# Patient Record
Sex: FEMALE | Race: WHITE | NOT HISPANIC OR LATINO | Employment: UNEMPLOYED | URBAN - METROPOLITAN AREA
[De-identification: names, ages, dates, MRNs, and addresses within clinical notes are randomized per-mention and may not be internally consistent; named-entity substitution may affect disease eponyms.]

---

## 2017-02-20 ENCOUNTER — TRANSCRIBE ORDERS (OUTPATIENT)
Dept: ADMINISTRATIVE | Facility: HOSPITAL | Age: 37
End: 2017-02-20

## 2017-02-20 DIAGNOSIS — J44.9 CHRONIC OBSTRUCTIVE PULMONARY DISEASE, UNSPECIFIED COPD TYPE (HCC): Primary | ICD-10-CM

## 2017-05-19 ENCOUNTER — APPOINTMENT (OUTPATIENT)
Dept: GENERAL RADIOLOGY | Facility: HOSPITAL | Age: 37
End: 2017-05-19

## 2017-05-19 ENCOUNTER — HOSPITAL ENCOUNTER (EMERGENCY)
Facility: HOSPITAL | Age: 37
Discharge: HOME | End: 2017-05-19
Attending: EMERGENCY MEDICINE | Admitting: EMERGENCY MEDICINE

## 2017-05-19 VITALS
HEIGHT: 69 IN | DIASTOLIC BLOOD PRESSURE: 58 MMHG | RESPIRATION RATE: 20 BRPM | TEMPERATURE: 98.1 F | SYSTOLIC BLOOD PRESSURE: 106 MMHG | BODY MASS INDEX: 30.51 KG/M2 | OXYGEN SATURATION: 98 % | HEART RATE: 49 BPM | WEIGHT: 206 LBS

## 2017-05-19 DIAGNOSIS — E87.6 HYPOKALEMIA: ICD-10-CM

## 2017-05-19 DIAGNOSIS — F41.0 ANXIETY ATTACK: ICD-10-CM

## 2017-05-19 DIAGNOSIS — R07.89 ATYPICAL CHEST PAIN: Primary | ICD-10-CM

## 2017-05-19 LAB
ANION GAP SERPL CALCULATED.3IONS-SCNC: 14.5 MMOL/L
BASOPHILS # BLD AUTO: 0.06 10*3/MM3 (ref 0–0.2)
BASOPHILS NFR BLD AUTO: 0.6 % (ref 0–2)
BUN BLD-MCNC: 5 MG/DL (ref 6–20)
BUN/CREAT SERPL: 5.2 (ref 7–25)
CALCIUM SPEC-SCNC: 8.8 MG/DL (ref 8.6–10.5)
CHLORIDE SERPL-SCNC: 104 MMOL/L (ref 98–107)
CO2 SERPL-SCNC: 19.5 MMOL/L (ref 22–29)
CREAT BLD-MCNC: 0.96 MG/DL (ref 0.57–1)
DEPRECATED RDW RBC AUTO: 40.8 FL (ref 37–54)
EOSINOPHIL # BLD AUTO: 0.23 10*3/MM3 (ref 0.1–0.3)
EOSINOPHIL NFR BLD AUTO: 2.3 % (ref 0–4)
ERYTHROCYTE [DISTWIDTH] IN BLOOD BY AUTOMATED COUNT: 13.1 % (ref 11.5–14.5)
GFR SERPL CREATININE-BSD FRML MDRD: 66 ML/MIN/1.73
GLUCOSE BLD-MCNC: 107 MG/DL (ref 65–99)
HCT VFR BLD AUTO: 35.8 % (ref 37–47)
HGB BLD-MCNC: 11.7 G/DL (ref 12–16)
IMM GRANULOCYTES # BLD: 0.05 10*3/MM3 (ref 0–0.03)
IMM GRANULOCYTES NFR BLD: 0.5 % (ref 0–0.5)
LITHIUM SERPL-SCNC: 1.1 MMOL/L (ref 0.6–1.2)
LYMPHOCYTES # BLD AUTO: 2.87 10*3/MM3 (ref 0.6–4.8)
LYMPHOCYTES NFR BLD AUTO: 28.5 % (ref 20–45)
MCH RBC QN AUTO: 28.2 PG (ref 27–31)
MCHC RBC AUTO-ENTMCNC: 32.7 G/DL (ref 31–37)
MCV RBC AUTO: 86.3 FL (ref 81–99)
MONOCYTES # BLD AUTO: 0.76 10*3/MM3 (ref 0–1)
MONOCYTES NFR BLD AUTO: 7.6 % (ref 3–8)
NEUTROPHILS # BLD AUTO: 6.09 10*3/MM3 (ref 1.5–8.3)
NEUTROPHILS NFR BLD AUTO: 60.5 % (ref 45–70)
NRBC BLD MANUAL-RTO: 0 /100 WBC (ref 0–0)
PLATELET # BLD AUTO: 314 10*3/MM3 (ref 140–500)
PMV BLD AUTO: 10.5 FL (ref 7.4–10.4)
POTASSIUM BLD-SCNC: 3.1 MMOL/L (ref 3.5–5.2)
RBC # BLD AUTO: 4.15 10*6/MM3 (ref 4.2–5.4)
SODIUM BLD-SCNC: 138 MMOL/L (ref 136–145)
TROPONIN T SERPL-MCNC: <0.01 NG/ML (ref 0–0.03)
WBC NRBC COR # BLD: 10.06 10*3/MM3 (ref 4.8–10.8)

## 2017-05-19 PROCEDURE — 85025 COMPLETE CBC W/AUTO DIFF WBC: CPT | Performed by: EMERGENCY MEDICINE

## 2017-05-19 PROCEDURE — 80048 BASIC METABOLIC PNL TOTAL CA: CPT | Performed by: EMERGENCY MEDICINE

## 2017-05-19 PROCEDURE — 99283 EMERGENCY DEPT VISIT LOW MDM: CPT

## 2017-05-19 PROCEDURE — 93010 ELECTROCARDIOGRAM REPORT: CPT | Performed by: INTERNAL MEDICINE

## 2017-05-19 PROCEDURE — 71020 HC CHEST PA AND LATERAL: CPT

## 2017-05-19 PROCEDURE — 96374 THER/PROPH/DIAG INJ IV PUSH: CPT

## 2017-05-19 PROCEDURE — 80178 ASSAY OF LITHIUM: CPT | Performed by: EMERGENCY MEDICINE

## 2017-05-19 PROCEDURE — 25010000002 KETOROLAC TROMETHAMINE PER 15 MG: Performed by: EMERGENCY MEDICINE

## 2017-05-19 PROCEDURE — 84484 ASSAY OF TROPONIN QUANT: CPT | Performed by: EMERGENCY MEDICINE

## 2017-05-19 PROCEDURE — 99284 EMERGENCY DEPT VISIT MOD MDM: CPT | Performed by: EMERGENCY MEDICINE

## 2017-05-19 PROCEDURE — 93005 ELECTROCARDIOGRAM TRACING: CPT | Performed by: EMERGENCY MEDICINE

## 2017-05-19 RX ORDER — POTASSIUM CHLORIDE 20 MEQ/1
20 TABLET, EXTENDED RELEASE ORAL ONCE
Status: COMPLETED | OUTPATIENT
Start: 2017-05-19 | End: 2017-05-19

## 2017-05-19 RX ORDER — SODIUM CHLORIDE 0.9 % (FLUSH) 0.9 %
10 SYRINGE (ML) INJECTION AS NEEDED
Status: DISCONTINUED | OUTPATIENT
Start: 2017-05-19 | End: 2017-05-19 | Stop reason: HOSPADM

## 2017-05-19 RX ORDER — LITHIUM CARBONATE 300 MG
300 TABLET ORAL ONCE
COMMUNITY
End: 2019-03-28 | Stop reason: DRUGHIGH

## 2017-05-19 RX ORDER — POTASSIUM CHLORIDE 20 MEQ/1
TABLET, EXTENDED RELEASE ORAL
Status: COMPLETED
Start: 2017-05-19 | End: 2017-05-19

## 2017-05-19 RX ORDER — BUSPIRONE HYDROCHLORIDE 15 MG/1
15 TABLET ORAL 4 TIMES DAILY
COMMUNITY
End: 2018-08-07

## 2017-05-19 RX ORDER — POTASSIUM CHLORIDE 20 MEQ/1
20 TABLET, EXTENDED RELEASE ORAL DAILY
Qty: 10 TABLET | Refills: 0 | Status: SHIPPED | OUTPATIENT
Start: 2017-05-19 | End: 2017-05-29

## 2017-05-19 RX ORDER — KETOROLAC TROMETHAMINE 30 MG/ML
30 INJECTION, SOLUTION INTRAMUSCULAR; INTRAVENOUS ONCE
Status: COMPLETED | OUTPATIENT
Start: 2017-05-19 | End: 2017-05-19

## 2017-05-19 RX ORDER — TOPIRAMATE 50 MG/1
50 TABLET, FILM COATED ORAL 2 TIMES DAILY
COMMUNITY
End: 2021-10-16

## 2017-05-19 RX ORDER — CLINDAMYCIN HYDROCHLORIDE 300 MG/1
300 CAPSULE ORAL 3 TIMES DAILY
COMMUNITY
End: 2018-08-07

## 2017-05-19 RX ORDER — MORPHINE SULFATE 2 MG/ML
2 INJECTION, SOLUTION INTRAMUSCULAR; INTRAVENOUS ONCE
Status: DISCONTINUED | OUTPATIENT
Start: 2017-05-19 | End: 2017-05-19

## 2017-05-19 RX ADMIN — POTASSIUM CHLORIDE 20 MEQ: 20 TABLET, EXTENDED RELEASE ORAL at 04:08

## 2017-05-19 RX ADMIN — KETOROLAC TROMETHAMINE 30 MG: 30 INJECTION INTRAMUSCULAR; INTRAVENOUS at 03:06

## 2017-08-06 ENCOUNTER — HOSPITAL ENCOUNTER (INPATIENT)
Dept: HOSPITAL 23 - P1S | Age: 37
LOS: 2 days | Discharge: HOME | DRG: 885 | End: 2017-08-08
Attending: SPECIALIST | Admitting: SPECIALIST
Payer: MEDICARE

## 2017-08-06 DIAGNOSIS — F31.9: Primary | ICD-10-CM

## 2017-08-06 DIAGNOSIS — Z90.710: ICD-10-CM

## 2017-08-06 DIAGNOSIS — R30.0: ICD-10-CM

## 2017-08-06 DIAGNOSIS — F41.9: ICD-10-CM

## 2017-08-06 DIAGNOSIS — F17.210: ICD-10-CM

## 2017-08-06 DIAGNOSIS — E03.9: ICD-10-CM

## 2017-08-06 DIAGNOSIS — F15.20: ICD-10-CM

## 2017-08-06 LAB
BARBITURATES UR QL SCN: 1.4 MG/DL (ref 0.2–2)
BARBITURATES UR QL SCN: 4.2 G/DL (ref 3.5–5)
BASOPHIL#: 0.1 X10E3 (ref 0–0.3)
BASOPHIL%: 0.7 % (ref 0–2.5)
BENZODIAZ UR QL SCN: 21 U/L (ref 10–42)
BENZODIAZ UR QL SCN: 38 U/L (ref 10–40)
BLOOD UREA NITROGEN: 7 MG/DL (ref 9–23)
BUN/CREATININE RATIO: 7
BZE UR QL SCN: 125 U/L (ref 32–92)
CALCIUM SERUM: 9.7 MG/DL (ref 8.4–10.2)
CK MB SERPL-RTO: 14.5 % (ref 11–15.5)
CK MB SERPL-RTO: 32 G/DL (ref 30–36)
CREATININE SERUM: 1 MG/DL (ref 0.6–1.4)
DIFF IND: NO
EOSINOPHIL#: 0.1 X10E3 (ref 0–0.7)
EOSINOPHIL%: 0.6 % (ref 0–7)
FREE THYROXIN (T4): 0.98 NG/DL (ref 0.58–1.64)
GLOM FILT RATE ESTIMATED: 71.9 ML/MIN (ref 60–?)
GLUCOSE FASTING: 81 MG/DL (ref 70–110)
HEMATOCRIT: 36.6 % (ref 35–45)
HEMOGLOBIN: 11.7 GM/DL (ref 12–16)
KETONES UR QL: 105 MMOL/L (ref 100–111)
KETONES UR QL: 22 MMOL/L (ref 22–31)
LYMPHOCYTE#: 2.3 X10E3 (ref 1–3.5)
LYMPHOCYTE%: 17.8 % (ref 17–45)
MEAN CELL VOLUME: 87 FL (ref 83–96)
MEAN CORPUSCULAR HEMOGLOBIN: 27.8 PG (ref 28–34)
MEAN PLATELET VOLUME: 9.6 FL (ref 6.5–11.5)
MONOCYTE#: 0.7 X10E3 (ref 0–1)
MONOCYTE%: 5.5 % (ref 3–12)
NEUTROPHIL#: 9.6 X10E3 (ref 1.5–7.1)
NEUTROPHIL%: 75.4 % (ref 40–75)
PLATELET COUNT: 388 X10E3 (ref 140–420)
POTASSIUM: 3.6 MMOL/L (ref 3.5–5.1)
PROTEIN TOTAL SERUM: 7.5 G/DL (ref 6–8.3)
RED BLOOD COUNT: 4.2 X10E (ref 3.9–5.3)
SODIUM: 137 MMOL/L (ref 135–145)
THYROID STIMULATING HORMONE: 1.41 UIU/ML (ref 0.34–5.6)
WHITE BLOOD COUNT: 12.7 X10E3 (ref 4–10.5)

## 2017-08-07 LAB
BARBITURATES: (no result)
BENZODIAZEPINES: (no result)
COCAINE: (no result)
DX ICD CODE: (no result)
DX ICD CODE: (no result)
GENTAMICIN PEAK SERPL-MCNC: NO MG/L
OPIATES: (no result)
TRICYCLIC ANTIDEPRESSANTS: (no result)
U METHADONE: (no result)
URINE APPEARANCE: CLEAR
URINE BILIRUBIN: (no result)
URINE BLOOD: (no result)
URINE COLOR: YELLOW
URINE GLUCOSE: (no result) MG/DL
URINE KETONE: (no result)
URINE LEUKOCYTE ESTERASE: (no result)
URINE NITRATE: (no result)
URINE PH: 6.5 (ref 5–8)
URINE PROTEIN: (no result)
URINE SOURCE: (no result)
URINE SPECIFIC GRAVITY: 1 (ref 1–1.03)
URINE UROBILINOGEN: 0.2 MG/DL

## 2017-08-10 ENCOUNTER — TRANSCRIBE ORDERS (OUTPATIENT)
Dept: ADMINISTRATIVE | Facility: HOSPITAL | Age: 37
End: 2017-08-10

## 2017-08-10 DIAGNOSIS — R42 DIZZINESS: ICD-10-CM

## 2017-08-10 DIAGNOSIS — R25.1 OCCASIONAL TREMORS: ICD-10-CM

## 2017-08-10 DIAGNOSIS — R51.9 NONINTRACTABLE HEADACHE, UNSPECIFIED CHRONICITY PATTERN, UNSPECIFIED HEADACHE TYPE: Primary | ICD-10-CM

## 2017-08-15 ENCOUNTER — HOSPITAL ENCOUNTER (OUTPATIENT)
Dept: MRI IMAGING | Facility: HOSPITAL | Age: 37
Discharge: HOME | End: 2017-08-15
Admitting: PHYSICIAN ASSISTANT

## 2017-08-15 DIAGNOSIS — R42 DIZZINESS: ICD-10-CM

## 2017-08-15 DIAGNOSIS — R25.1 OCCASIONAL TREMORS: ICD-10-CM

## 2017-08-15 DIAGNOSIS — R51.9 NONINTRACTABLE HEADACHE, UNSPECIFIED CHRONICITY PATTERN, UNSPECIFIED HEADACHE TYPE: ICD-10-CM

## 2017-08-15 PROCEDURE — 0 GADOBENATE DIMEGLUMINE 529 MG/ML SOLUTION: Performed by: PHYSICIAN ASSISTANT

## 2017-08-15 PROCEDURE — 70553 MRI BRAIN STEM W/O & W/DYE: CPT

## 2017-08-15 PROCEDURE — A9577 INJ MULTIHANCE: HCPCS | Performed by: PHYSICIAN ASSISTANT

## 2017-08-15 RX ADMIN — GADOBENATE DIMEGLUMINE 19 ML: 529 INJECTION, SOLUTION INTRAVENOUS at 15:15

## 2018-03-05 ENCOUNTER — HOSPITAL ENCOUNTER (EMERGENCY)
Facility: HOSPITAL | Age: 38
Discharge: HOME | End: 2018-03-05
Attending: EMERGENCY MEDICINE | Admitting: EMERGENCY MEDICINE

## 2018-03-05 VITALS
SYSTOLIC BLOOD PRESSURE: 118 MMHG | HEIGHT: 69 IN | OXYGEN SATURATION: 100 % | RESPIRATION RATE: 16 BRPM | DIASTOLIC BLOOD PRESSURE: 74 MMHG | TEMPERATURE: 97.5 F | BODY MASS INDEX: 27.7 KG/M2 | WEIGHT: 187 LBS | HEART RATE: 68 BPM

## 2018-03-05 DIAGNOSIS — N39.0 URINARY TRACT INFECTION WITHOUT HEMATURIA, SITE UNSPECIFIED: Primary | ICD-10-CM

## 2018-03-05 LAB
BACTERIA UR QL AUTO: ABNORMAL /HPF
BILIRUB UR QL STRIP: NEGATIVE
CLARITY UR: CLEAR
COLOR UR: YELLOW
GLUCOSE UR STRIP-MCNC: NEGATIVE MG/DL
HGB UR QL STRIP.AUTO: ABNORMAL
HYALINE CASTS UR QL AUTO: ABNORMAL /LPF
KETONES UR QL STRIP: NEGATIVE
LEUKOCYTE ESTERASE UR QL STRIP.AUTO: NEGATIVE
MUCOUS THREADS URNS QL MICRO: ABNORMAL /HPF
NITRITE UR QL STRIP: NEGATIVE
PH UR STRIP.AUTO: 7 [PH] (ref 4.5–8)
PROT UR QL STRIP: NEGATIVE
RBC # UR: ABNORMAL /HPF
REF LAB TEST METHOD: ABNORMAL
SP GR UR STRIP: 1.01 (ref 1–1.03)
SQUAMOUS #/AREA URNS HPF: ABNORMAL /HPF
UROBILINOGEN UR QL STRIP: ABNORMAL
WBC UR QL AUTO: ABNORMAL /HPF

## 2018-03-05 PROCEDURE — 99282 EMERGENCY DEPT VISIT SF MDM: CPT | Performed by: PHYSICIAN ASSISTANT

## 2018-03-05 PROCEDURE — 81001 URINALYSIS AUTO W/SCOPE: CPT | Performed by: PHYSICIAN ASSISTANT

## 2018-03-05 PROCEDURE — 99283 EMERGENCY DEPT VISIT LOW MDM: CPT

## 2018-03-05 RX ORDER — NITROFURANTOIN 25; 75 MG/1; MG/1
100 CAPSULE ORAL ONCE
Status: COMPLETED | OUTPATIENT
Start: 2018-03-05 | End: 2018-03-05

## 2018-03-05 RX ORDER — NAPROXEN 375 MG/1
375 TABLET ORAL 2 TIMES DAILY PRN
Qty: 20 TABLET | Refills: 0 | Status: SHIPPED | OUTPATIENT
Start: 2018-03-05 | End: 2019-03-28 | Stop reason: ALTCHOICE

## 2018-03-05 RX ORDER — NITROFURANTOIN 25; 75 MG/1; MG/1
100 CAPSULE ORAL 2 TIMES DAILY
Qty: 14 CAPSULE | Refills: 0 | Status: SHIPPED | OUTPATIENT
Start: 2018-03-05 | End: 2018-03-05 | Stop reason: HOSPADM

## 2018-03-05 RX ORDER — IBUPROFEN 400 MG/1
800 TABLET ORAL ONCE
Status: COMPLETED | OUTPATIENT
Start: 2018-03-05 | End: 2018-03-05

## 2018-03-05 RX ORDER — NITROFURANTOIN MACROCRYSTALS 50 MG/1
CAPSULE ORAL
Status: COMPLETED
Start: 2018-03-05 | End: 2018-03-05

## 2018-03-05 RX ADMIN — NITROFURANTOIN MACROCRYSTALS 100 MG: 50 CAPSULE ORAL at 22:35

## 2018-03-05 RX ADMIN — NITROFURANTOIN MONOHYDRATE AND NITROFURANTOIN MACROCRYSTALLINE 100 MG: 75; 25 CAPSULE ORAL at 22:37

## 2018-03-05 RX ADMIN — IBUPROFEN 800 MG: 400 TABLET ORAL at 22:35

## 2018-03-06 NOTE — ED PROVIDER NOTES
"Subjective   History of Present Illness  History of Present Illness    Chief complaint: abd pain    Location: suprapubic    Quality/Severity:  \"pain\", moderate    Timing/Duration: this am    Modifying Factors: Worse with urination.  Nothing makes better.    Associated Symptoms: Positive nausea.  No vomiting.  Positive chills, denies fevers.  Positive low back pain.  Positive urgency and frequency.  Denies dysuria.    Narrative: 37-year-old female presents with abdominal pain that started this morning.  She denies any trauma.  She denies any vaginal discharge.    Review of Systems  General: Denies fevers or chills.  Denies any weakness or fatigue.  Denies any weight loss or weight gain.  SKIN: Denies any rashes lesions or ulcers.  Denies color change.  ENT: Denies sore throat or rhinorrhea.  Denies ear pain.    EYES: Denies any blurred vision.  Denies any change in vision.  Denies any photophobia.  Denies any vision loss.  LUNGS: Denies any shortness of breath or wheezing.  Denies any cough.  Denies any hemoptysis.  CARDIAC: Denies any chest pain.  Denies palpitations.  Denies syncope.  Denies any edema  ABD: + abdominal pain.  Denies any nausea or vomiting or diarrhea.  Denies any rectal bleeding.  Denies constipation  : + dysuria, urgency, frequency. Denies hematuria.  Denies discharge.  Denies flank pain.  NEURO: Denies any focal weakness.  Denies headache.  Denies seizures.  Denies changes in speech or difficulty walking.  ENDOCRINE: Denies polydipsia and polyuria  M/S: Denies arthralgias, back pain, myalgias or neck pain  HEME/LYMPH: Negative for adenopathy. Does not bruise/bleed easily.   PSYCH: Negative for suicidal ideas. Denies anxiety or depression  review was performed in addition to those in the above all other reviews are negative.      Past Medical History:   Diagnosis Date   • ADHD (attention deficit hyperactivity disorder)    • Bipolar 1 disorder    • Depression    • Hypothyroid    • Psychosis    • " PTSD (post-traumatic stress disorder)        Allergies   Allergen Reactions   • Sulfa Antibiotics        Past Surgical History:   Procedure Laterality Date   • APPENDECTOMY     • COLONOSCOPY     • HYSTERECTOMY         Family History   Problem Relation Age of Onset   • Heart disease Mother    • Migraines Mother    • Heart disease Father    • Hypertension Father    • Colon cancer Father    • Kidney disease Father        Social History     Social History   • Marital status:      Social History Main Topics   • Smoking status: Current Every Day Smoker     Packs/day: 1.00     Types: Cigarettes   • Alcohol use No   • Drug use: No     No current facility-administered medications for this encounter.     Current Outpatient Prescriptions:   •  Divalproex Sodium (DEPAKOTE PO), Take  by mouth., Disp: , Rfl:   •  FLUoxetine HCl (PROZAC PO), Take  by mouth., Disp: , Rfl:   •  Levothyroxine Sodium (SYNTHROID PO), Take  by mouth., Disp: , Rfl:   •  lithium 300 MG tablet, Take 300 mg by mouth 1 (One) Time., Disp: , Rfl:   •  LITHIUM PO, Take 450 mg by mouth 2 (Two) Times a Day., Disp: , Rfl:   •  OLANZapine (ZYPREXA PO), Take  by mouth., Disp: , Rfl:   •  topiramate (TOPAMAX) 50 MG tablet, Take 50 mg by mouth 2 (Two) Times a Day., Disp: , Rfl:   •  Amphetamine-Dextroamphetamine (ADDERALL PO), Take 15 mg by mouth., Disp: , Rfl:   •  busPIRone (BUSPAR) 15 MG tablet, Take 15 mg by mouth 4 (Four) Times a Day., Disp: , Rfl:   •  clindamycin (CLEOCIN) 300 MG capsule, Take 300 mg by mouth 3 (Three) Times a Day., Disp: , Rfl:   •  PROPRANOLOL HCL PO, Take 25 mg by mouth 2 (Two) Times a Day., Disp: , Rfl:         Objective   Physical Exam  Vitals:    03/05/18 2130   BP: 118/74   Pulse: 68   Resp: 16   Temp: 97.5 °F (36.4 °C)   SpO2: 100%     GENERAL: Alert and oriented ×4.  No apparent distress.  SKIN: Warm, pink and dry  HEENT: Atraumatic normocephalic  LUNGS: Clear to auscultation bilaterally without wheezes, rales or  rhonchi  CARDIAC: Regular rate and rhythm.  S1 and S2.  No murmurs, rubs or gallops.  ABD: Soft, mild suprapubic tenderness.  No guarding or rebound tenderness, + bilat lower back tenderness, no CVA tenderness.   M/S: MAEW, no deformity  PSYCH: Normal mood and affect    Procedures         ED Course  ED Course    pt has had both hysterectomy and appendix out.  Will treat symptomatically    Results for orders placed or performed during the hospital encounter of 03/05/18   Urinalysis With / Culture If Indicated - Urine, Clean Catch   Result Value Ref Range    Color, UA Yellow Yellow, Straw    Appearance, UA Clear Clear    pH, UA 7.0 4.5 - 8.0    Specific Gravity, UA 1.015 1.003 - 1.030    Glucose, UA Negative Negative    Ketones, UA Negative Negative, 80 mg/dL (3+), >=160 mg/dL (4+)    Bilirubin, UA Negative Negative    Blood, UA Trace (A) Negative    Protein, UA Negative Negative    Leuk Esterase, UA Negative Negative    Nitrite, UA Negative Negative    Urobilinogen, UA 1.0 E.U./dL 0.2 - 1.0 E.U./dL   Urinalysis, Microscopic Only - Urine, Clean Catch   Result Value Ref Range    RBC, UA 0-2 (A) None Seen /HPF    WBC, UA 0-2 (A) None Seen /HPF    Bacteria, UA None Seen None Seen /HPF    Squamous Epithelial Cells, UA 3-6 (A) None Seen, 0-2 /HPF    Hyaline Casts, UA None Seen None Seen /LPF    Mucus, UA Trace None Seen, Trace /HPF    Methodology Manual Light Microscopy      Discussed pertinent labs and imaging findings with the patient/family.  Patient/Family voiced understanding of need to follow-up for recheck, further testing as needed.  Return to the emergency Department warnings were given.  D/c with macrobid, naproxen.              MDM  Number of Diagnoses or Management Options  Urinary tract infection without hematuria, site unspecified: new and requires workup     Amount and/or Complexity of Data Reviewed  Clinical lab tests: reviewed and ordered  Tests in the medicine section of CPT®: ordered and reviewed    Risk  of Complications, Morbidity, and/or Mortality  Presenting problems: low  Diagnostic procedures: low  Management options: low    Patient Progress  Patient progress: improved      Final diagnoses:   Urinary tract infection without hematuria, site unspecified       Dictated utilizing Dragon dictation       Bonnie Gordon PA-C  03/05/18 8164

## 2018-06-27 ENCOUNTER — TRANSCRIBE ORDERS (OUTPATIENT)
Dept: ADMINISTRATIVE | Facility: HOSPITAL | Age: 38
End: 2018-06-27

## 2018-06-27 DIAGNOSIS — N63.0 BREAST LUMP: ICD-10-CM

## 2018-06-27 DIAGNOSIS — N64.4 BREAST PAIN: Primary | ICD-10-CM

## 2018-07-31 ENCOUNTER — APPOINTMENT (OUTPATIENT)
Dept: MAMMOGRAPHY | Facility: HOSPITAL | Age: 38
End: 2018-07-31

## 2018-08-06 ENCOUNTER — HOSPITAL ENCOUNTER (OUTPATIENT)
Dept: MAMMOGRAPHY | Facility: HOSPITAL | Age: 38
Discharge: HOME | End: 2018-08-06
Admitting: PHYSICIAN ASSISTANT

## 2018-08-06 DIAGNOSIS — N63.0 BREAST LUMP: ICD-10-CM

## 2018-08-06 DIAGNOSIS — N64.4 BREAST PAIN: ICD-10-CM

## 2018-08-06 PROCEDURE — 77066 DX MAMMO INCL CAD BI: CPT

## 2018-08-06 PROCEDURE — G0279 TOMOSYNTHESIS, MAMMO: HCPCS

## 2018-08-07 ENCOUNTER — APPOINTMENT (OUTPATIENT)
Dept: MAMMOGRAPHY | Facility: HOSPITAL | Age: 38
End: 2018-08-07

## 2019-03-28 ENCOUNTER — OFFICE VISIT (OUTPATIENT)
Dept: CARDIOLOGY | Facility: CLINIC | Age: 39
End: 2019-03-28

## 2019-03-28 VITALS
BODY MASS INDEX: 27.7 KG/M2 | WEIGHT: 187 LBS | DIASTOLIC BLOOD PRESSURE: 80 MMHG | HEART RATE: 61 BPM | SYSTOLIC BLOOD PRESSURE: 110 MMHG | HEIGHT: 69 IN

## 2019-03-28 DIAGNOSIS — R00.2 PALPITATIONS: Primary | ICD-10-CM

## 2019-03-28 DIAGNOSIS — R42 DIZZINESS: ICD-10-CM

## 2019-03-28 DIAGNOSIS — R07.2 PRECORDIAL PAIN: ICD-10-CM

## 2019-03-28 DIAGNOSIS — Z92.89 HISTORY OF ABNORMAL HOLTER EXAM: ICD-10-CM

## 2019-03-28 DIAGNOSIS — R00.1 BRADYCARDIA, SINUS: ICD-10-CM

## 2019-03-28 PROCEDURE — 99204 OFFICE O/P NEW MOD 45 MIN: CPT | Performed by: INTERNAL MEDICINE

## 2019-03-28 RX ORDER — LAMOTRIGINE 200 MG/1
200 TABLET ORAL DAILY
COMMUNITY
End: 2021-10-16

## 2019-04-08 ENCOUNTER — TELEPHONE (OUTPATIENT)
Dept: CARDIOLOGY | Facility: CLINIC | Age: 39
End: 2019-04-08

## 2019-04-08 NOTE — TELEPHONE ENCOUNTER
----- Message from Madi Martinez III, MD sent at 4/7/2019  2:03 PM EDT -----  Please call patient- we got her records- after reviewing, I have ordered an echo and an event monitor.  We'll touch base with her after we have those results

## 2019-04-11 NOTE — TELEPHONE ENCOUNTER
Can you mail a letter to the pt and let her know to contact our office to schedule her for and echo and event monitor?    Thanks Alla

## 2019-05-06 ENCOUNTER — HOSPITAL ENCOUNTER (OUTPATIENT)
Dept: CARDIOLOGY | Facility: HOSPITAL | Age: 39
Discharge: HOME | End: 2019-05-06
Admitting: INTERNAL MEDICINE

## 2019-05-06 VITALS
DIASTOLIC BLOOD PRESSURE: 64 MMHG | HEART RATE: 74 BPM | HEIGHT: 69 IN | WEIGHT: 187 LBS | SYSTOLIC BLOOD PRESSURE: 96 MMHG | BODY MASS INDEX: 27.7 KG/M2

## 2019-05-06 DIAGNOSIS — R00.1 BRADYCARDIA, SINUS: ICD-10-CM

## 2019-05-06 DIAGNOSIS — R07.2 PRECORDIAL PAIN: ICD-10-CM

## 2019-05-06 DIAGNOSIS — R00.2 PALPITATIONS: ICD-10-CM

## 2019-05-06 DIAGNOSIS — R42 DIZZINESS: ICD-10-CM

## 2019-05-06 DIAGNOSIS — R00.2 PALPITATIONS: Primary | ICD-10-CM

## 2019-05-06 LAB
ASCENDING AORTA: 2.8 CM
BH CV ECHO MEAS - ACS: 2 CM
BH CV ECHO MEAS - AO MAX PG (FULL): 2.2 MMHG
BH CV ECHO MEAS - AO MAX PG: 4.8 MMHG
BH CV ECHO MEAS - AO MEAN PG (FULL): 1 MMHG
BH CV ECHO MEAS - AO MEAN PG: 2.7 MMHG
BH CV ECHO MEAS - AO ROOT AREA (BSA CORRECTED): 1.4
BH CV ECHO MEAS - AO ROOT AREA: 5.8 CM^2
BH CV ECHO MEAS - AO ROOT DIAM: 2.7 CM
BH CV ECHO MEAS - AO V2 MAX: 109.7 CM/SEC
BH CV ECHO MEAS - AO V2 MEAN: 77.1 CM/SEC
BH CV ECHO MEAS - AO V2 VTI: 18.5 CM
BH CV ECHO MEAS - AVA(I,A): 2.6 CM^2
BH CV ECHO MEAS - AVA(I,D): 2.6 CM^2
BH CV ECHO MEAS - AVA(V,A): 2.3 CM^2
BH CV ECHO MEAS - AVA(V,D): 2.3 CM^2
BH CV ECHO MEAS - BSA(HAYCOCK): 2 M^2
BH CV ECHO MEAS - BSA: 2 M^2
BH CV ECHO MEAS - BZI_BMI: 27.6 KILOGRAMS/M^2
BH CV ECHO MEAS - BZI_METRIC_HEIGHT: 175.3 CM
BH CV ECHO MEAS - BZI_METRIC_WEIGHT: 84.8 KG
BH CV ECHO MEAS - EDV(MOD-SP2): 56 ML
BH CV ECHO MEAS - EDV(MOD-SP4): 70 ML
BH CV ECHO MEAS - EDV(TEICH): 78.2 ML
BH CV ECHO MEAS - EF(CUBED): 65.8 %
BH CV ECHO MEAS - EF(MOD-BP): 60 %
BH CV ECHO MEAS - EF(MOD-SP2): 58.9 %
BH CV ECHO MEAS - EF(MOD-SP4): 61.4 %
BH CV ECHO MEAS - EF(TEICH): 57.8 %
BH CV ECHO MEAS - ESV(MOD-SP2): 23 ML
BH CV ECHO MEAS - ESV(MOD-SP4): 27 ML
BH CV ECHO MEAS - ESV(TEICH): 33 ML
BH CV ECHO MEAS - FS: 30.1 %
BH CV ECHO MEAS - IVS/LVPW: 1.2
BH CV ECHO MEAS - IVSD: 1.1 CM
BH CV ECHO MEAS - LAT PEAK E' VEL: 10 CM/SEC
BH CV ECHO MEAS - LV DIASTOLIC VOL/BSA (35-75): 34.9 ML/M^2
BH CV ECHO MEAS - LV MASS(C)D: 148 GRAMS
BH CV ECHO MEAS - LV MASS(C)DI: 73.7 GRAMS/M^2
BH CV ECHO MEAS - LV MAX PG: 2.6 MMHG
BH CV ECHO MEAS - LV MEAN PG: 1.7 MMHG
BH CV ECHO MEAS - LV SYSTOLIC VOL/BSA (12-30): 13.4 ML/M^2
BH CV ECHO MEAS - LV V1 MAX: 81 CM/SEC
BH CV ECHO MEAS - LV V1 MEAN: 61.2 CM/SEC
BH CV ECHO MEAS - LV V1 VTI: 15.3 CM
BH CV ECHO MEAS - LVIDD: 4.2 CM
BH CV ECHO MEAS - LVIDS: 2.9 CM
BH CV ECHO MEAS - LVLD AP2: 8.1 CM
BH CV ECHO MEAS - LVLD AP4: 7.3 CM
BH CV ECHO MEAS - LVLS AP2: 6.5 CM
BH CV ECHO MEAS - LVLS AP4: 6.2 CM
BH CV ECHO MEAS - LVOT AREA (M): 3.1 CM^2
BH CV ECHO MEAS - LVOT AREA: 3.1 CM^2
BH CV ECHO MEAS - LVOT DIAM: 2 CM
BH CV ECHO MEAS - LVPWD: 0.98 CM
BH CV ECHO MEAS - MED PEAK E' VEL: 10 CM/SEC
BH CV ECHO MEAS - MV A DUR: 0.12 SEC
BH CV ECHO MEAS - MV A MAX VEL: 45.6 CM/SEC
BH CV ECHO MEAS - MV DEC SLOPE: 178.4 CM/SEC^2
BH CV ECHO MEAS - MV DEC TIME: 0.27 SEC
BH CV ECHO MEAS - MV E MAX VEL: 48.5 CM/SEC
BH CV ECHO MEAS - MV E/A: 1.1
BH CV ECHO MEAS - MV MAX PG: 1.2 MMHG
BH CV ECHO MEAS - MV MEAN PG: 0.62 MMHG
BH CV ECHO MEAS - MV P1/2T MAX VEL: 50 CM/SEC
BH CV ECHO MEAS - MV P1/2T: 82 MSEC
BH CV ECHO MEAS - MV V2 MAX: 54.8 CM/SEC
BH CV ECHO MEAS - MV V2 MEAN: 38.2 CM/SEC
BH CV ECHO MEAS - MV V2 VTI: 14.7 CM
BH CV ECHO MEAS - MVA P1/2T LCG: 4.4 CM^2
BH CV ECHO MEAS - MVA(P1/2T): 2.7 CM^2
BH CV ECHO MEAS - MVA(VTI): 3.2 CM^2
BH CV ECHO MEAS - PA ACC TIME: 0.12 SEC
BH CV ECHO MEAS - PA MAX PG (FULL): 1.1 MMHG
BH CV ECHO MEAS - PA MAX PG: 2.8 MMHG
BH CV ECHO MEAS - PA PR(ACCEL): 23.5 MMHG
BH CV ECHO MEAS - PA V2 MAX: 84.2 CM/SEC
BH CV ECHO MEAS - PULM A REVS DUR: 0.12 SEC
BH CV ECHO MEAS - PULM A REVS VEL: 65.7 CM/SEC
BH CV ECHO MEAS - PULM DIAS VEL: 43.8 CM/SEC
BH CV ECHO MEAS - PULM S/D: 1.2
BH CV ECHO MEAS - PULM SYS VEL: 50.5 CM/SEC
BH CV ECHO MEAS - PVA(V,A): 2.2 CM^2
BH CV ECHO MEAS - PVA(V,D): 2.2 CM^2
BH CV ECHO MEAS - QP/QS: 0.74
BH CV ECHO MEAS - RV MAX PG: 1.7 MMHG
BH CV ECHO MEAS - RV MEAN PG: 0.98 MMHG
BH CV ECHO MEAS - RV V1 MAX: 65 CM/SEC
BH CV ECHO MEAS - RV V1 MEAN: 47.9 CM/SEC
BH CV ECHO MEAS - RV V1 VTI: 12.3 CM
BH CV ECHO MEAS - RVOT AREA: 2.9 CM^2
BH CV ECHO MEAS - RVOT DIAM: 1.9 CM
BH CV ECHO MEAS - SI(AO): 53.6 ML/M^2
BH CV ECHO MEAS - SI(CUBED): 24.1 ML/M^2
BH CV ECHO MEAS - SI(LVOT): 23.7 ML/M^2
BH CV ECHO MEAS - SI(MOD-SP2): 16.4 ML/M^2
BH CV ECHO MEAS - SI(MOD-SP4): 21.4 ML/M^2
BH CV ECHO MEAS - SI(TEICH): 22.5 ML/M^2
BH CV ECHO MEAS - SV(AO): 107.6 ML
BH CV ECHO MEAS - SV(CUBED): 48.4 ML
BH CV ECHO MEAS - SV(LVOT): 47.6 ML
BH CV ECHO MEAS - SV(MOD-SP2): 33 ML
BH CV ECHO MEAS - SV(MOD-SP4): 43 ML
BH CV ECHO MEAS - SV(RVOT): 35.3 ML
BH CV ECHO MEAS - SV(TEICH): 45.1 ML
BH CV ECHO MEAS - TAPSE (>1.6): 1.7 CM2
BH CV ECHO MEASUREMENTS AVERAGE E/E' RATIO: 4.85
BH CV XLRA - RV BASE: 2.1 CM
BH CV XLRA - TDI S': 10 CM/SEC
LEFT ATRIUM VOLUME INDEX: 13 ML/M2
SINUS: 2.7 CM
STJ: 2.8 CM

## 2019-05-06 PROCEDURE — 93306 TTE W/DOPPLER COMPLETE: CPT

## 2019-05-06 PROCEDURE — 93306 TTE W/DOPPLER COMPLETE: CPT | Performed by: INTERNAL MEDICINE

## 2019-05-07 ENCOUNTER — TELEPHONE (OUTPATIENT)
Dept: CARDIOLOGY | Facility: CLINIC | Age: 39
End: 2019-05-07

## 2019-05-07 NOTE — TELEPHONE ENCOUNTER
Called and got the message that the VM has not been setup. Will continue to try.  Lina Schaeffer RN

## 2019-05-07 NOTE — TELEPHONE ENCOUNTER
----- Message from Madi Martinez III, MD sent at 5/6/2019  8:22 PM EDT -----  Please call- normal results

## 2019-05-21 ENCOUNTER — TELEPHONE (OUTPATIENT)
Dept: CARDIOLOGY | Facility: CLINIC | Age: 39
End: 2019-05-21

## 2019-05-21 NOTE — TELEPHONE ENCOUNTER
----- Message from Madi Martinez III, MD sent at 5/21/2019  8:56 AM EDT -----  Please call- normal results

## 2021-10-16 ENCOUNTER — HOSPITAL ENCOUNTER (EMERGENCY)
Facility: HOSPITAL | Age: 41
Discharge: HOME | End: 2021-10-16
Attending: EMERGENCY MEDICINE | Admitting: EMERGENCY MEDICINE

## 2021-10-16 VITALS
WEIGHT: 207 LBS | DIASTOLIC BLOOD PRESSURE: 76 MMHG | BODY MASS INDEX: 30.66 KG/M2 | RESPIRATION RATE: 18 BRPM | SYSTOLIC BLOOD PRESSURE: 117 MMHG | HEART RATE: 54 BPM | OXYGEN SATURATION: 98 % | HEIGHT: 69 IN | TEMPERATURE: 99 F

## 2021-10-16 DIAGNOSIS — M54.30 SCIATICA, UNSPECIFIED LATERALITY: Primary | ICD-10-CM

## 2021-10-16 PROCEDURE — 99283 EMERGENCY DEPT VISIT LOW MDM: CPT

## 2021-10-16 PROCEDURE — 63710000001 PREDNISONE PER 1 MG: Performed by: EMERGENCY MEDICINE

## 2021-10-16 PROCEDURE — 99282 EMERGENCY DEPT VISIT SF MDM: CPT | Performed by: EMERGENCY MEDICINE

## 2021-10-16 RX ORDER — GABAPENTIN 100 MG/1
300 CAPSULE ORAL 3 TIMES DAILY
Status: ON HOLD | COMMUNITY
End: 2023-01-12

## 2021-10-16 RX ORDER — METHYLPREDNISOLONE 4 MG/1
TABLET ORAL
Qty: 21 TABLET | Refills: 0 | Status: SHIPPED | OUTPATIENT
Start: 2021-10-16 | End: 2021-11-11

## 2021-10-16 RX ORDER — HYDROCODONE BITARTRATE AND ACETAMINOPHEN 5; 325 MG/1; MG/1
1 TABLET ORAL EVERY 6 HOURS PRN
Qty: 30 TABLET | Refills: 0 | Status: SHIPPED | OUTPATIENT
Start: 2021-10-16 | End: 2021-11-11

## 2021-10-16 RX ORDER — PREDNISONE 20 MG/1
60 TABLET ORAL ONCE
Status: COMPLETED | OUTPATIENT
Start: 2021-10-16 | End: 2021-10-16

## 2021-10-16 RX ORDER — HYDROXYZINE 50 MG/1
25 TABLET, FILM COATED ORAL 2 TIMES DAILY
COMMUNITY

## 2021-10-16 RX ADMIN — PREDNISONE 60 MG: 20 TABLET ORAL at 09:10

## 2021-10-16 NOTE — ED PROVIDER NOTES
Subjective   History of Present Illness  History of Present Illness    Chief complaint: Back pain    Location: Low back    Quality/Severity: Moderate, sharp    Timing/Onset/Duration: This morning    Modifying Factors: Hurts to move, feels better to remain still    Associated Symptoms: Patient complains of numbness down the sides of both legs.  No change of control of bladder or bowel function.  No weakness no fever or chills.  No abdominal pain.  No burning when she urinates.    Narrative: This 41-year-old white female presents with low back pain.  She denies trauma.  She has no history of sciatica or back surgery.    PCP:Margoth Saunders APRN      Review of Systems   Constitutional: Negative for chills and fever.   Neurological: Positive for numbness (Sides of both legs). Negative for weakness.        No change in bladder or bowel function        Medication List      ASK your doctor about these medications    lamoTRIgine 200 MG tablet  Commonly known as: LaMICtal     LITHIUM PO     SYNTHROID PO     topiramate 50 MG tablet  Commonly known as: TOPAMAX            Past Medical History:   Diagnosis Date   • ADHD (attention deficit hyperactivity disorder)    • Bipolar 1 disorder (CMS/HCC)    • Depression    • Hypothyroid    • Psychosis (CMS/HCC)    • PTSD (post-traumatic stress disorder)        Allergies   Allergen Reactions   • Sulfa Antibiotics        Past Surgical History:   Procedure Laterality Date   • APPENDECTOMY     • COLONOSCOPY     • HYSTERECTOMY         Family History   Problem Relation Age of Onset   • Heart disease Mother 44   • Migraines Mother    • Breast cancer Mother    • Heart attack Mother 44   • Heart disease Father 46   • Hypertension Father    • Colon cancer Father    • Kidney disease Father    • Heart attack Father 65   • Stroke Father    • Diabetes Father    • Aneurysm Paternal Grandmother        Social History     Socioeconomic History   • Marital status:    Tobacco Use   • Smoking  status: Current Every Day Smoker     Packs/day: 1.00     Types: Cigarettes   • Tobacco comment: caff use   Substance and Sexual Activity   • Alcohol use: No   • Drug use: No           Objective   Physical Exam  Vitals (The temperature is 99 °F.  The pulse is 54 and the respirations 18.  The BP is 117/76.  The room air pulse ox is 98%.) and nursing note reviewed.   Constitutional:       Appearance: Normal appearance.   Musculoskeletal:      Comments: There is tenderness upon palpation of the midline upper lumbar spine there is no bony step-off or deformity.  There is positive straight leg raise on the right and the left there is 2+ dorsalis pedis pulse.  Sensation is intact.  Flexes are symmetrical bilaterally.   Skin:     General: Skin is warm and dry.      Capillary Refill: Capillary refill takes less than 2 seconds.   Neurological:      General: No focal deficit present.      Mental Status: She is alert and oriented to person, place, and time.      Sensory: No sensory deficit.      Motor: No weakness.         Procedures           ED Course      09:06 EDT, 10/16/21:  The patient's diagnosis of sciatica was discussed with her.  The patient will be given a prednisone here in the emergency department.  I written a prescription for alcohol.  The patient should take Colace as needed as directed for constipation if she is taking the Norco for pain.  Patient should apply moist heat for 20 minutes every 2 hours while she is awake for 2 to 3 days.  The patient should follow-up with her primary care provider in 1 week.  She should return to the emergency department if there is increased pain, numbness, tingling, weakness, change in bladder or bowel function, fever, chills, worse in any way at all.                                     MDM    Final diagnoses:   Sciatica, unspecified laterality       ED Disposition  ED Disposition     None          No follow-up provider specified.       Medication List      No changes were made  to your prescriptions during this visit.          Zen Weeks MD  10/16/21 0967

## 2021-10-16 NOTE — DISCHARGE INSTRUCTIONS
Take Colace as needed as directed for constipation if you are taking the Norco for pain.  Follow-up with your primary care provider in 1 week.  Return to the emergency department if there is increased pain, numbness, tingling, weakness, change in bladder or bowel function, fever, chills, worse in any way at all.

## 2021-11-11 ENCOUNTER — PATIENT ROUNDING (BHMG ONLY) (OUTPATIENT)
Dept: GASTROENTEROLOGY | Facility: CLINIC | Age: 41
End: 2021-11-11

## 2021-11-11 ENCOUNTER — LAB (OUTPATIENT)
Dept: LAB | Facility: HOSPITAL | Age: 41
End: 2021-11-11

## 2021-11-11 ENCOUNTER — OFFICE VISIT (OUTPATIENT)
Dept: GASTROENTEROLOGY | Facility: CLINIC | Age: 41
End: 2021-11-11

## 2021-11-11 VITALS
WEIGHT: 208.2 LBS | SYSTOLIC BLOOD PRESSURE: 104 MMHG | BODY MASS INDEX: 30.84 KG/M2 | DIASTOLIC BLOOD PRESSURE: 70 MMHG | HEIGHT: 69 IN

## 2021-11-11 DIAGNOSIS — K75.81 NASH (NONALCOHOLIC STEATOHEPATITIS): Primary | ICD-10-CM

## 2021-11-11 DIAGNOSIS — K59.04 CHRONIC IDIOPATHIC CONSTIPATION: ICD-10-CM

## 2021-11-11 DIAGNOSIS — I10 ESSENTIAL (PRIMARY) HYPERTENSION: ICD-10-CM

## 2021-11-11 DIAGNOSIS — K75.81 NASH (NONALCOHOLIC STEATOHEPATITIS): ICD-10-CM

## 2021-11-11 DIAGNOSIS — Z80.0 FAMILY HISTORY OF COLON CANCER: ICD-10-CM

## 2021-11-11 LAB
ALBUMIN SERPL-MCNC: 4.8 G/DL (ref 3.5–5.2)
ALBUMIN/GLOB SERPL: 1.5 G/DL
ALP SERPL-CCNC: 125 U/L (ref 39–117)
ALT SERPL W P-5'-P-CCNC: 59 U/L (ref 1–33)
ANION GAP SERPL CALCULATED.3IONS-SCNC: 11.1 MMOL/L (ref 5–15)
AST SERPL-CCNC: 52 U/L (ref 1–32)
BILIRUB SERPL-MCNC: 0.3 MG/DL (ref 0–1.2)
BUN SERPL-MCNC: 6 MG/DL (ref 6–20)
BUN/CREAT SERPL: 8.2 (ref 7–25)
CALCIUM SPEC-SCNC: 9.5 MG/DL (ref 8.6–10.5)
CHLORIDE SERPL-SCNC: 107 MMOL/L (ref 98–107)
CO2 SERPL-SCNC: 20.9 MMOL/L (ref 22–29)
CREAT SERPL-MCNC: 0.73 MG/DL (ref 0.57–1)
GFR SERPL CREATININE-BSD FRML MDRD: 88 ML/MIN/1.73
GLOBULIN UR ELPH-MCNC: 3.2 GM/DL
GLUCOSE SERPL-MCNC: 92 MG/DL (ref 65–99)
HCV AB SER DONR QL: NORMAL
IRON 24H UR-MRATE: 48 MCG/DL (ref 37–145)
IRON SATN MFR SERPL: 11 % (ref 20–50)
POTASSIUM SERPL-SCNC: 3.8 MMOL/L (ref 3.5–5.2)
PROT SERPL-MCNC: 8 G/DL (ref 6–8.5)
SODIUM SERPL-SCNC: 139 MMOL/L (ref 136–145)
TIBC SERPL-MCNC: 428 MCG/DL (ref 298–536)
TRANSFERRIN SERPL-MCNC: 287 MG/DL (ref 200–360)

## 2021-11-11 PROCEDURE — 99204 OFFICE O/P NEW MOD 45 MIN: CPT | Performed by: INTERNAL MEDICINE

## 2021-11-11 PROCEDURE — 84450 TRANSFERASE (AST) (SGOT): CPT | Performed by: INTERNAL MEDICINE

## 2021-11-11 PROCEDURE — 83883 ASSAY NEPHELOMETRY NOT SPEC: CPT | Performed by: INTERNAL MEDICINE

## 2021-11-11 PROCEDURE — 83516 IMMUNOASSAY NONANTIBODY: CPT | Performed by: INTERNAL MEDICINE

## 2021-11-11 PROCEDURE — 82977 ASSAY OF GGT: CPT | Performed by: INTERNAL MEDICINE

## 2021-11-11 PROCEDURE — 82465 ASSAY BLD/SERUM CHOLESTEROL: CPT | Performed by: INTERNAL MEDICINE

## 2021-11-11 PROCEDURE — 83010 ASSAY OF HAPTOGLOBIN QUANT: CPT | Performed by: INTERNAL MEDICINE

## 2021-11-11 PROCEDURE — 82247 BILIRUBIN TOTAL: CPT | Performed by: INTERNAL MEDICINE

## 2021-11-11 PROCEDURE — 82947 ASSAY GLUCOSE BLOOD QUANT: CPT | Performed by: INTERNAL MEDICINE

## 2021-11-11 PROCEDURE — 83540 ASSAY OF IRON: CPT | Performed by: INTERNAL MEDICINE

## 2021-11-11 PROCEDURE — 36415 COLL VENOUS BLD VENIPUNCTURE: CPT | Performed by: INTERNAL MEDICINE

## 2021-11-11 PROCEDURE — 84466 ASSAY OF TRANSFERRIN: CPT | Performed by: INTERNAL MEDICINE

## 2021-11-11 PROCEDURE — 86803 HEPATITIS C AB TEST: CPT | Performed by: INTERNAL MEDICINE

## 2021-11-11 PROCEDURE — 84478 ASSAY OF TRIGLYCERIDES: CPT | Performed by: INTERNAL MEDICINE

## 2021-11-11 PROCEDURE — 82784 ASSAY IGA/IGD/IGG/IGM EACH: CPT

## 2021-11-11 PROCEDURE — 80053 COMPREHEN METABOLIC PANEL: CPT | Performed by: INTERNAL MEDICINE

## 2021-11-11 PROCEDURE — 82172 ASSAY OF APOLIPOPROTEIN: CPT | Performed by: INTERNAL MEDICINE

## 2021-11-11 PROCEDURE — 84460 ALANINE AMINO (ALT) (SGPT): CPT | Performed by: INTERNAL MEDICINE

## 2021-11-11 PROCEDURE — 82785 ASSAY OF IGE: CPT

## 2021-11-11 RX ORDER — RISPERIDONE 2 MG/1
2 TABLET ORAL 2 TIMES DAILY
COMMUNITY
Start: 2021-09-12 | End: 2022-04-11

## 2021-11-11 RX ORDER — TOPIRAMATE 100 MG/1
100 TABLET, FILM COATED ORAL 2 TIMES DAILY
COMMUNITY
Start: 2021-08-28

## 2021-11-11 RX ORDER — TIZANIDINE 4 MG/1
4 TABLET ORAL 2 TIMES DAILY PRN
COMMUNITY
Start: 2021-09-12 | End: 2022-04-11

## 2021-11-11 RX ORDER — LEVOTHYROXINE SODIUM 0.12 MG/1
125 TABLET ORAL
COMMUNITY
Start: 2021-10-21

## 2021-11-11 RX ORDER — ROPINIROLE 0.25 MG/1
TABLET, FILM COATED ORAL
COMMUNITY
Start: 2021-10-21 | End: 2022-04-11

## 2021-11-11 RX ORDER — FLUOXETINE HYDROCHLORIDE 40 MG/1
40 CAPSULE ORAL DAILY
COMMUNITY
Start: 2021-10-21 | End: 2022-04-11

## 2021-11-11 RX ORDER — AMLODIPINE BESYLATE 2.5 MG/1
2.5 TABLET ORAL DAILY
COMMUNITY
Start: 2021-10-21

## 2021-11-11 RX ORDER — METHOCARBAMOL 750 MG/1
TABLET, FILM COATED ORAL
COMMUNITY
Start: 2021-10-21 | End: 2022-04-11

## 2021-11-11 NOTE — PROGRESS NOTES
November 11, 2021    Hello, may I speak with Aruna Hammonds?    My name is Tawanna Raza      I am  with [facility]  [address]  [place] [place] [facility] [address] [phone].    Before we get started may I verify your date of birth? 1980    I am calling to officially welcome you to our practice and ask about your recent visit. Is this a good time to talk? yes    Tell me about your visit with us. What things went well?  Everyghing was good.  Dr. Richardson was very kind.  Apologized to me for being late.  Wow!       We're always looking for ways to make our patients' experiences even better. Do you have recommendations on ways we may improve?  no    Overall were you satisfied with your first visit to our practice? yes       I appreciate you taking the time to speak with me today. Is there anything else I can do for you? no      Thank you, and have a great day.

## 2021-11-11 NOTE — PROGRESS NOTES
"    PATIENT INFORMATION  Aruna Hammonds       - 1980    CHIEF COMPLAINT  Chief Complaint   Patient presents with   • Elevated Hepatic Enzymes   • Elevated RBCs       HISTORY OF PRESENT ILLNESS  Here for LFT from July and normal GGT at the time of AST aprox ALT  With AP but normaL biLi    Baseline fatty liver form CT in  and normal LFTs then. No symptoms in July just annual labs and no Abx this calendar year    Only family member with cirrhosis was a GM and she drank and lived into her 70s    The Pt feels alcohol make her nauseated \"even one drink\"    Fmily member  of CRC in his 50s and she is 9 years out from her lat exam and is of course now >40.      REVIEWED PERTINENT RESULTS/ LABS  No results found for: CASEREPORT, FINALDX  Lab Results   Component Value Date    HGB 13.8 2020    MCV 86.9 2020     2020    ALT 31 2020    AST 33 2020    INR 1.0 2020    TRIG 259 (H) 2020      No results found.    REVIEW OF SYSTEMS  Review of Systems   Constitutional: Negative for activity change, chills, fever and unexpected weight change.   HENT: Positive for trouble swallowing. Negative for congestion.    Eyes: Negative for visual disturbance.   Respiratory: Positive for cough and choking. Negative for shortness of breath.    Cardiovascular: Negative for chest pain and palpitations.   Gastrointestinal: Positive for anal bleeding, blood in stool, constipation and nausea. Negative for abdominal pain.   Endocrine: Negative for cold intolerance and heat intolerance.   Genitourinary: Negative for hematuria.   Musculoskeletal: Negative for gait problem.   Skin: Negative for color change.   Allergic/Immunologic: Negative for immunocompromised state.   Neurological: Negative for weakness and light-headedness.   Hematological: Negative for adenopathy.   Psychiatric/Behavioral: Negative for sleep disturbance. The patient is not nervous/anxious.          ACTIVE " PROBLEMS  Patient Active Problem List    Diagnosis    • Family history of colon cancer [Z80.0]    • Chronic idiopathic constipation [K59.04]    • HUMPHREY (nonalcoholic steatohepatitis) [K75.81]          PAST MEDICAL HISTORY  Past Medical History:   Diagnosis Date   • ADHD (attention deficit hyperactivity disorder)    • Bipolar 1 disorder (HCC)    • Depression    • Hypothyroid    • Psychosis (HCC)    • PTSD (post-traumatic stress disorder)          SURGICAL HISTORY  Past Surgical History:   Procedure Laterality Date   • APPENDECTOMY     • COLONOSCOPY     • HYSTERECTOMY           FAMILY HISTORY  Family History   Problem Relation Age of Onset   • Heart disease Mother 44   • Migraines Mother    • Breast cancer Mother    • Heart attack Mother 44   • Heart disease Father 46   • Hypertension Father    • Colon cancer Father    • Kidney disease Father    • Heart attack Father 65   • Stroke Father    • Diabetes Father    • Aneurysm Paternal Grandmother          SOCIAL HISTORY  Social History     Occupational History   • Not on file   Tobacco Use   • Smoking status: Current Every Day Smoker     Packs/day: 1.00     Types: Cigarettes   • Smokeless tobacco: Not on file   • Tobacco comment: caff use   Vaping Use   • Vaping Use: Never used   Substance and Sexual Activity   • Alcohol use: No   • Drug use: No   • Sexual activity: Defer         CURRENT MEDICATIONS    Current Outpatient Medications:   •  amLODIPine (NORVASC) 2.5 MG tablet, Take 2.5 mg by mouth Daily., Disp: , Rfl:   •  FLUoxetine (PROzac) 40 MG capsule, Take 40 mg by mouth Daily., Disp: , Rfl:   •  gabapentin (NEURONTIN) 100 MG capsule, Take 100 mg by mouth 3 (Three) Times a Day., Disp: , Rfl:   •  hydrOXYzine (ATARAX) 50 MG tablet, Take 50 mg by mouth 3 (Three) Times a Day As Needed for Itching., Disp: , Rfl:   •  levothyroxine (SYNTHROID, LEVOTHROID) 125 MCG tablet, TAKE 1 TABLET BY MOUTH EVERY DAY IN THE MORNING ON AN EMPTY STOMACH, Disp: , Rfl:   •  LITHIUM PO, Take  "450 mg by mouth 2 (Two) Times a Day., Disp: , Rfl:   •  methocarbamol (ROBAXIN) 750 MG tablet, TAKE 1 TABLET BY MOUTH THREE TIMES DAILY FOR MUSCLE SPASMS, Disp: , Rfl:   •  risperiDONE (risperDAL) 2 MG tablet, Take 2 mg by mouth 2 (Two) Times a Day., Disp: , Rfl:   •  rOPINIRole (REQUIP) 0.25 MG tablet, TAKE 1 TABLET BY MOUTH EVERY NIGHT AT BEDTIME AND 1 TO 2 HOURS BEFORE BEDTIME, Disp: , Rfl:   •  tiZANidine (ZANAFLEX) 4 MG tablet, Take 4 mg by mouth 2 (Two) Times a Day As Needed., Disp: , Rfl:   •  topiramate (TOPAMAX) 100 MG tablet, Take 100 mg by mouth 2 (Two) Times a Day., Disp: , Rfl:   •  linaclotide (LINZESS) 145 MCG capsule capsule, Take 1 capsule by mouth Every Morning Before Breakfast., Disp: 30 capsule, Rfl: 11    ALLERGIES  Sulfa antibiotics    VITALS  Vitals:    11/11/21 0908   BP: 104/70   BP Location: Left arm   Patient Position: Sitting   Cuff Size: Large Adult   Weight: 94.4 kg (208 lb 3.2 oz)   Height: 175.3 cm (69\")       PHYSICAL EXAM  Debilities/Disabilities Identified: None  Emotional Behavior: Appropriate  Wt Readings from Last 3 Encounters:   11/11/21 94.4 kg (208 lb 3.2 oz)   10/16/21 93.9 kg (207 lb)   05/06/19 84.8 kg (187 lb)     Ht Readings from Last 1 Encounters:   11/11/21 175.3 cm (69\")     Body mass index is 30.75 kg/m².  Physical Exam  Constitutional:       Appearance: She is well-developed. She is not diaphoretic.   Eyes:      General: No scleral icterus.     Conjunctiva/sclera: Conjunctivae normal.      Pupils: Pupils are equal, round, and reactive to light.   Neck:      Thyroid: No thyromegaly.   Cardiovascular:      Rate and Rhythm: Normal rate and regular rhythm.      Heart sounds: Normal heart sounds. No murmur heard.  No gallop.    Pulmonary:      Effort: Pulmonary effort is normal.      Breath sounds: Normal breath sounds. No wheezing or rales.   Abdominal:      General: Bowel sounds are normal. There is no distension or abdominal bruit.      Palpations: Abdomen is soft. " There is no shifting dullness, fluid wave or mass.      Tenderness: There is abdominal tenderness in the right lower quadrant, epigastric area and left lower quadrant. There is no guarding. Negative signs include Crowe's sign.      Hernia: There is no hernia in the ventral area.   Musculoskeletal:         General: Normal range of motion.      Cervical back: Normal range of motion and neck supple.   Lymphadenopathy:      Cervical: No cervical adenopathy.   Skin:     General: Skin is warm and dry.      Findings: No erythema or rash.   Neurological:      Mental Status: She is alert and oriented to person, place, and time.         CLINICAL DATA REVIEWED   reviewed previous lab results and integrated with today's visit, reviewed notes from other physicians and/or last GI encounter, reviewed previous endoscopy results and available photos, reviewed surgical pathology results from previous biopsies    ASSESSMENT  Diagnoses and all orders for this visit:    HUMPHREY (nonalcoholic steatohepatitis)  -     Iron Profile  -     Alpha - 1 - Antitrypsin Deficiency; Future  -     Immunoglobulins Quant; Future  -     Hepatitis C Antibody  -     HUMPHREY Fibrosure  -     Mitochondrial Antibodies, M2  -     US Liver; Future    Chronic idiopathic constipation    Family history of colon cancer    Essential (primary) hypertension   -     HUMPHREY Fibrosure    Other orders  -     FLUoxetine (PROzac) 40 MG capsule; Take 40 mg by mouth Daily.  -     levothyroxine (SYNTHROID, LEVOTHROID) 125 MCG tablet; TAKE 1 TABLET BY MOUTH EVERY DAY IN THE MORNING ON AN EMPTY STOMACH  -     amLODIPine (NORVASC) 2.5 MG tablet; Take 2.5 mg by mouth Daily.  -     methocarbamol (ROBAXIN) 750 MG tablet; TAKE 1 TABLET BY MOUTH THREE TIMES DAILY FOR MUSCLE SPASMS  -     risperiDONE (risperDAL) 2 MG tablet; Take 2 mg by mouth 2 (Two) Times a Day.  -     rOPINIRole (REQUIP) 0.25 MG tablet; TAKE 1 TABLET BY MOUTH EVERY NIGHT AT BEDTIME AND 1 TO 2 HOURS BEFORE BEDTIME  -      tiZANidine (ZANAFLEX) 4 MG tablet; Take 4 mg by mouth 2 (Two) Times a Day As Needed.  -     topiramate (TOPAMAX) 100 MG tablet; Take 100 mg by mouth 2 (Two) Times a Day.  -     linaclotide (LINZESS) 145 MCG capsule capsule; Take 1 capsule by mouth Every Morning Before Breakfast.          PLAN  Return in about 2 months (around 1/11/2022).    I have discussed the above plan with the patient.  They verbalize understanding and are in agreement with the plan.  They have been advised to contact the office for any questions, concerns, or changes related to their health.

## 2021-11-12 LAB — MITOCHONDRIA M2 IGG SER-ACNC: <20 UNITS (ref 0–20)

## 2021-11-13 LAB
A2 MACROGLOB SERPL-MCNC: 171 MG/DL (ref 110–276)
ALT SERPL W P-5'-P-CCNC: 65 IU/L (ref 0–40)
APO A-I SERPL-MCNC: 97 MG/DL (ref 116–209)
AST SERPL W P-5'-P-CCNC: 58 IU/L (ref 0–40)
BILIRUB SERPL-MCNC: 0.3 MG/DL (ref 0–1.2)
CHOLEST SERPL-MCNC: 211 MG/DL (ref 100–199)
FIBROSIS SCORING:: ABNORMAL
FIBROSIS STAGE SERPL QL: ABNORMAL
GGT SERPL-CCNC: 33 IU/L (ref 0–60)
GLUCOSE SERPL-MCNC: 94 MG/DL (ref 65–99)
HAPTOGLOB SERPL-MCNC: 178 MG/DL (ref 42–296)
INTERPRETATIONS: (REFERENCE): ABNORMAL
LABORATORY COMMENT REPORT: ABNORMAL
LIVER FIBR SCORE SERPL CALC.FIBROSURE: 0.11 (ref 0–0.21)
NASH SCORING (REFERENCE): ABNORMAL
NECROINFLAMMATORY ACT GRADE SERPL QL: ABNORMAL
NECROINFLAMMATORY ACT SCORE SERPL: 0.5
SERVICE CMNT-IMP: ABNORMAL
STEATOSIS GRADE (REFERENCE): ABNORMAL
STEATOSIS GRADING (REFERENCE): ABNORMAL
STEATOSIS SCORE (REFERENCE): 0.82 (ref 0–0.3)
TRIGL SERPL-MCNC: 199 MG/DL (ref 0–149)

## 2021-11-14 LAB
IGA SERPL-MCNC: 348 MG/DL (ref 87–352)
IGE SERPL-ACNC: 14 IU/ML (ref 6–495)
IGG SERPL-MCNC: 1281 MG/DL (ref 586–1602)
IGM SERPL-MCNC: 81 MG/DL (ref 26–217)

## 2021-11-19 LAB
LAB DIRECTOR NAME PROVIDER: NORMAL
SERPINA1 GENE MUT ANL BLD/T: NORMAL
SERPINA1 GENE MUT TESTED BLD/T: NORMAL

## 2021-11-30 ENCOUNTER — APPOINTMENT (OUTPATIENT)
Dept: ULTRASOUND IMAGING | Facility: HOSPITAL | Age: 41
End: 2021-11-30

## 2022-03-16 ENCOUNTER — OFFICE VISIT (OUTPATIENT)
Dept: SURGERY | Facility: CLINIC | Age: 42
End: 2022-03-16

## 2022-03-16 VITALS
DIASTOLIC BLOOD PRESSURE: 72 MMHG | BODY MASS INDEX: 31.7 KG/M2 | WEIGHT: 214 LBS | RESPIRATION RATE: 16 BRPM | HEART RATE: 44 BPM | SYSTOLIC BLOOD PRESSURE: 110 MMHG | HEIGHT: 69 IN

## 2022-03-16 DIAGNOSIS — K82.8 GALLBLADDER SLUDGE: Primary | ICD-10-CM

## 2022-03-16 PROCEDURE — 99204 OFFICE O/P NEW MOD 45 MIN: CPT | Performed by: SURGERY

## 2022-03-16 NOTE — PROGRESS NOTES
Aruna Hammonds 41 y.o. female presents @ the req of RAYA Walsh for eval of GB SLUDGE, N/V, bloating, RUQ pain, chg in bowel habits = constipation alt with diarrhea.   Chief Complaint   Patient presents with   • GB SLUDGE             HPI   Above-noted agree.  This very pleasant patient has been suffering with right upper quadrant pain for the past month.  Her significant other had similar gallbladder issues and have her gallbladder removed.  Aruna had an ultrasound which showed gallbladder sludge.  She quit smoking in February.  In addition to her pain she also had nausea vomiting.  She has no chest pain or shortness of breath.  She has no fevers or chills.  She has no other complaints.      Review of Systems   All other systems reviewed and are negative.            Current Outpatient Medications:   •  amLODIPine (NORVASC) 2.5 MG tablet, Take 2.5 mg by mouth Daily., Disp: , Rfl:   •  FLUoxetine (PROzac) 40 MG capsule, Take 40 mg by mouth Daily., Disp: , Rfl:   •  gabapentin (NEURONTIN) 100 MG capsule, Take 100 mg by mouth 3 (Three) Times a Day., Disp: , Rfl:   •  hydrOXYzine (ATARAX) 50 MG tablet, Take 50 mg by mouth 3 (Three) Times a Day As Needed for Itching., Disp: , Rfl:   •  levothyroxine (SYNTHROID, LEVOTHROID) 125 MCG tablet, TAKE 1 TABLET BY MOUTH EVERY DAY IN THE MORNING ON AN EMPTY STOMACH, Disp: , Rfl:   •  linaclotide (LINZESS) 145 MCG capsule capsule, Take 1 capsule by mouth Every Morning Before Breakfast., Disp: 30 capsule, Rfl: 11  •  LITHIUM PO, Take 450 mg by mouth 2 (Two) Times a Day., Disp: , Rfl:   •  methocarbamol (ROBAXIN) 750 MG tablet, TAKE 1 TABLET BY MOUTH THREE TIMES DAILY FOR MUSCLE SPASMS, Disp: , Rfl:   •  risperiDONE (risperDAL) 2 MG tablet, Take 2 mg by mouth 2 (Two) Times a Day., Disp: , Rfl:   •  rOPINIRole (REQUIP) 0.25 MG tablet, TAKE 1 TABLET BY MOUTH EVERY NIGHT AT BEDTIME AND 1 TO 2 HOURS BEFORE BEDTIME, Disp: , Rfl:   •  tiZANidine (ZANAFLEX) 4 MG tablet, Take 4 mg  "by mouth 2 (Two) Times a Day As Needed., Disp: , Rfl:   •  topiramate (TOPAMAX) 100 MG tablet, Take 100 mg by mouth 2 (Two) Times a Day., Disp: , Rfl:         Allergies   Allergen Reactions   • Sulfa Antibiotics            Past Medical History:   Diagnosis Date   • ADHD (attention deficit hyperactivity disorder)    • Bipolar 1 disorder (HCC)    • Depression    • Hypothyroid    • Psychosis (HCC)    • PTSD (post-traumatic stress disorder)            Past Surgical History:   Procedure Laterality Date   • APPENDECTOMY     • COLONOSCOPY     • HYSTERECTOMY             Social History     Tobacco Use   • Smoking status: Former Smoker     Packs/day: 1.00     Types: Cigarettes     Quit date: 2022     Years since quittin.0   • Smokeless tobacco: Never Used   • Tobacco comment: caff use   Vaping Use   • Vaping Use: Never used   Substance Use Topics   • Alcohol use: No   • Drug use: No             There is no immunization history on file for this patient.        Physical Exam  Vitals and nursing note reviewed.   Constitutional:       Appearance: Normal appearance.   HENT:      Head: Normocephalic and atraumatic.   Cardiovascular:      Rate and Rhythm: Normal rate and regular rhythm.   Pulmonary:      Effort: Pulmonary effort is normal.      Breath sounds: Normal breath sounds.   Abdominal:      General: Bowel sounds are normal.      Palpations: Abdomen is soft.   Musculoskeletal:         General: No swelling or tenderness.   Skin:     General: Skin is warm and dry.   Neurological:      General: No focal deficit present.      Mental Status: She is alert and oriented to person, place, and time.   Psychiatric:         Mood and Affect: Mood normal.         Behavior: Behavior normal.         Debilities/Disabilities Identified: None    Emotional Behavior: Appropriate      /72   Pulse (!) 44   Resp 16   Ht 175.3 cm (69\")   Wt 97.1 kg (214 lb)   BMI 31.60 kg/m²         Diagnoses and all orders for this visit:    1. " Gallbladder sludge (Primary)    I discussed with the patient the benefits and risks of performing a laparoscopic cholecystectomy with intraoperative cholangiogram possible open procedure.  Benefits and risks not limited to but including: Bleeding, infection, hernia formation, having to convert to an open procedure, injury to intra-abdominal structures, intra-abdominal abscess, intra-abdominal biloma, bile leak, DVT, PE, atelectasis, pneumonia, anesthetic complications.  The patient appeared to understand and is willing to proceed.    Thank you for allowing me to participate in the care of this interesting patient.

## 2022-04-06 ENCOUNTER — TRANSCRIBE ORDERS (OUTPATIENT)
Dept: ADMINISTRATIVE | Facility: HOSPITAL | Age: 42
End: 2022-04-06

## 2022-04-06 DIAGNOSIS — R13.10 DYSPHAGIA, UNSPECIFIED TYPE: Primary | ICD-10-CM

## 2022-04-11 ENCOUNTER — PRE-ADMISSION TESTING (OUTPATIENT)
Dept: PREADMISSION TESTING | Facility: HOSPITAL | Age: 42
End: 2022-04-11

## 2022-04-11 VITALS
WEIGHT: 207 LBS | OXYGEN SATURATION: 98 % | DIASTOLIC BLOOD PRESSURE: 71 MMHG | HEIGHT: 69 IN | HEART RATE: 61 BPM | RESPIRATION RATE: 16 BRPM | SYSTOLIC BLOOD PRESSURE: 125 MMHG | BODY MASS INDEX: 30.66 KG/M2

## 2022-04-11 LAB — SARS-COV-2 RNA PNL SPEC NAA+PROBE: NOT DETECTED

## 2022-04-11 PROCEDURE — C9803 HOPD COVID-19 SPEC COLLECT: HCPCS

## 2022-04-11 PROCEDURE — 93005 ELECTROCARDIOGRAM TRACING: CPT

## 2022-04-11 PROCEDURE — 93010 ELECTROCARDIOGRAM REPORT: CPT | Performed by: INTERNAL MEDICINE

## 2022-04-11 PROCEDURE — 87635 SARS-COV-2 COVID-19 AMP PRB: CPT | Performed by: SURGERY

## 2022-04-11 NOTE — PAT
Pt here for PAT visit.  Pre-op tests completed, chg soap given, and instructions reviewed.  Instructed NPO after midnight per Dr Sahu's instructions. covid complete

## 2022-04-11 NOTE — DISCHARGE INSTRUCTIONS
PRE-ADMISSION TESTING INSTRUCTIONS FOR ADULTS    Take these medications the morning of surgery with a small sip of water: topamax, lithium, linzess, amlodipine, gabapentin, hydroxyine, synthroid      No aspirin, advil, aleve, ibuprofen, naproxen, diet pills, decongestants, or herbal/vitamins for a week prior to surgery.    General Instructions:    DO NOT EAT SOLID FOOD AFTER MIDNIGHT THE NIGHT BEFORE SURGERY. No gum, mints, or hard candy after midnight the night before surgery.    - Nothing to drink after midnight except for a sip of water with your meds   Patients who avoid smoking, chewing tobacco and alcohol for 4 weeks prior to surgery have a reduced risk of post-operative complications.  If at all possible, quit smoking as many days before surgery as you can.    Do not smoke, use chewing tobacco or drink alcohol the day of surgery    Bring your C-PAP/ BI-PAP machine if you use one.  Wear clean comfortable clothes and socks.  Do not wear contact lenses, lotion, deodorant, or make-up.  Bring a case for your glasses if applicable. You may brush your teeth the morning of surgery.  You may wear dentures/partials, do not put adhesive/glue on them.    Leave all other jewelry and valuables at home.      Preventing a Surgical Site Infection:    Shower the night before and on the morning of surgery using the chlorhexidine soap you were given.  Use a clean washcloth with the soap.  Place clean sheets on your bed after showering the night before surgery. Do not use the CHG soap on your hair, face, or private areas. Wash your body gently for five (5) minutes. Do not scrub your skin.  Dry with a clean towel and dress in clean clothing.    Do not shave the surgical area for 10 days-2 weeks prior to surgery  because the razor can irritate skin and make it easier to develop an infection.  Make sure you, your family, and all healthcare providers clean their hands with soap and water or an alcohol based hand  before  caring for you or your wound.      Day of surgery:    Your surgeon’s office will advise you of your arrival time for the day of surgery.    Upon arrival, a Pre-op nurse and Anesthesia provider will review your health history, obtain vital signs, and answer questions you may have.  The only belongings needed at this time will be your home medications and if applicable your C-PAP/BI-PAP machine.  If you are staying overnight your family can leave the rest of your belongings in the car and bring them to your room later.  A Pre-op nurse will start an IV and you may receive medication in preparation for surgery, including something to help you relax.  Your family will be able to see you in the Pre-op area.  While you are in surgery your family should notify the waiting room  if they leave the waiting room area and provide a contact phone number.    IF you have any questions, you can call the Pre-Admission Department at [phone] or your surgeon's office.  Notify your surgeon if  you become sick, have a fever, productive cough, or cannot be here the day of surgery    Please be aware that surgery does come with discomfort.  We want to make every effort to control your discomfort so please discuss any uncontrolled symptoms with your nurse.   Your doctor will most likely have prescribed pain medications.      If you are going home after surgery, you will receive individualized written care instructions before being discharged.  A responsible adult (over the age of 18) must drive you to and from the hospital on the day of your surgery and stay with you for 24 hours after anesthesia.    If you are staying overnight following surgery, you will be transported to your hospital room following the recovery period.  [facility] has all private rooms.    You may receive a survey regarding the care you received. Your feedback is very important and will be used to collect the necessary data to help us to  continue to provide excellent care.     Deductibles and co-payments are collected on the day of service. Please be prepared to pay the required co-pay, deductible or deposit on the day of service as defined by your plan.

## 2022-04-12 LAB — QT INTERVAL: 471 MS

## 2022-04-13 ENCOUNTER — ANESTHESIA EVENT (OUTPATIENT)
Dept: PERIOP | Facility: HOSPITAL | Age: 42
End: 2022-04-13

## 2022-04-14 ENCOUNTER — HOSPITAL ENCOUNTER (OUTPATIENT)
Facility: HOSPITAL | Age: 42
Setting detail: HOSPITAL OUTPATIENT SURGERY
Discharge: HOME | End: 2022-04-14
Attending: SURGERY | Admitting: SURGERY

## 2022-04-14 ENCOUNTER — ANESTHESIA (OUTPATIENT)
Dept: PERIOP | Facility: HOSPITAL | Age: 42
End: 2022-04-14

## 2022-04-14 ENCOUNTER — APPOINTMENT (OUTPATIENT)
Dept: GENERAL RADIOLOGY | Facility: HOSPITAL | Age: 42
End: 2022-04-14

## 2022-04-14 VITALS
HEART RATE: 55 BPM | TEMPERATURE: 98.7 F | OXYGEN SATURATION: 97 % | RESPIRATION RATE: 15 BRPM | BODY MASS INDEX: 30.86 KG/M2 | SYSTOLIC BLOOD PRESSURE: 90 MMHG | WEIGHT: 209 LBS | DIASTOLIC BLOOD PRESSURE: 42 MMHG

## 2022-04-14 DIAGNOSIS — K82.8 GALLBLADDER SLUDGE: ICD-10-CM

## 2022-04-14 LAB
ALBUMIN SERPL-MCNC: 3.8 G/DL (ref 3.5–5.2)
ALBUMIN/GLOB SERPL: 1.3 G/DL
ALP SERPL-CCNC: 128 U/L (ref 39–117)
ALT SERPL W P-5'-P-CCNC: 33 U/L (ref 1–33)
ANION GAP SERPL CALCULATED.3IONS-SCNC: 8.6 MMOL/L (ref 5–15)
AST SERPL-CCNC: 36 U/L (ref 1–32)
BASOPHILS # BLD AUTO: 0.05 10*3/MM3 (ref 0–0.2)
BASOPHILS NFR BLD AUTO: 0.6 % (ref 0–1.5)
BILIRUB SERPL-MCNC: 0.3 MG/DL (ref 0–1.2)
BUN SERPL-MCNC: 3 MG/DL (ref 6–20)
BUN/CREAT SERPL: 3.5 (ref 7–25)
CALCIUM SPEC-SCNC: 9 MG/DL (ref 8.6–10.5)
CHLORIDE SERPL-SCNC: 107 MMOL/L (ref 98–107)
CO2 SERPL-SCNC: 21.4 MMOL/L (ref 22–29)
CREAT SERPL-MCNC: 0.85 MG/DL (ref 0.57–1)
DEPRECATED RDW RBC AUTO: 45.5 FL (ref 37–54)
EGFRCR SERPLBLD CKD-EPI 2021: 88.4 ML/MIN/1.73
EOSINOPHIL # BLD AUTO: 0.2 10*3/MM3 (ref 0–0.4)
EOSINOPHIL NFR BLD AUTO: 2.4 % (ref 0.3–6.2)
ERYTHROCYTE [DISTWIDTH] IN BLOOD BY AUTOMATED COUNT: 13.5 % (ref 12.3–15.4)
GLOBULIN UR ELPH-MCNC: 3 GM/DL
GLUCOSE SERPL-MCNC: 108 MG/DL (ref 65–99)
HCT VFR BLD AUTO: 41.9 % (ref 34–46.6)
HGB BLD-MCNC: 13 G/DL (ref 12–15.9)
IMM GRANULOCYTES # BLD AUTO: 0.01 10*3/MM3 (ref 0–0.05)
IMM GRANULOCYTES NFR BLD AUTO: 0.1 % (ref 0–0.5)
LYMPHOCYTES # BLD AUTO: 2.94 10*3/MM3 (ref 0.7–3.1)
LYMPHOCYTES NFR BLD AUTO: 35.4 % (ref 19.6–45.3)
MCH RBC QN AUTO: 27.8 PG (ref 26.6–33)
MCHC RBC AUTO-ENTMCNC: 31 G/DL (ref 31.5–35.7)
MCV RBC AUTO: 89.7 FL (ref 79–97)
MONOCYTES # BLD AUTO: 0.53 10*3/MM3 (ref 0.1–0.9)
MONOCYTES NFR BLD AUTO: 6.4 % (ref 5–12)
NEUTROPHILS NFR BLD AUTO: 4.57 10*3/MM3 (ref 1.7–7)
NEUTROPHILS NFR BLD AUTO: 55.1 % (ref 42.7–76)
PLATELET # BLD AUTO: 311 10*3/MM3 (ref 140–450)
PMV BLD AUTO: 10.4 FL (ref 6–12)
POTASSIUM SERPL-SCNC: 3.4 MMOL/L (ref 3.5–5.2)
PROT SERPL-MCNC: 6.8 G/DL (ref 6–8.5)
RBC # BLD AUTO: 4.67 10*6/MM3 (ref 3.77–5.28)
SODIUM SERPL-SCNC: 137 MMOL/L (ref 136–145)
WBC NRBC COR # BLD: 8.3 10*3/MM3 (ref 3.4–10.8)

## 2022-04-14 PROCEDURE — 25010000002 MIDAZOLAM PER 1MG: Performed by: NURSE ANESTHETIST, CERTIFIED REGISTERED

## 2022-04-14 PROCEDURE — 47563 LAPARO CHOLECYSTECTOMY/GRAPH: CPT | Performed by: SPECIALIST/TECHNOLOGIST, OTHER

## 2022-04-14 PROCEDURE — 85025 COMPLETE CBC W/AUTO DIFF WBC: CPT | Performed by: SURGERY

## 2022-04-14 PROCEDURE — 25010000002 ONDANSETRON PER 1 MG: Performed by: NURSE ANESTHETIST, CERTIFIED REGISTERED

## 2022-04-14 PROCEDURE — 74300 X-RAY BILE DUCTS/PANCREAS: CPT

## 2022-04-14 PROCEDURE — 25010000002 SUCCINYLCHOLINE PER 20 MG: Performed by: NURSE ANESTHETIST, CERTIFIED REGISTERED

## 2022-04-14 PROCEDURE — C1889 IMPLANT/INSERT DEVICE, NOC: HCPCS | Performed by: SURGERY

## 2022-04-14 PROCEDURE — 25010000002 PROPOFOL 10 MG/ML EMULSION: Performed by: NURSE ANESTHETIST, CERTIFIED REGISTERED

## 2022-04-14 PROCEDURE — 0 CEFAZOLIN SODIUM-DEXTROSE 2-3 GM-%(50ML) RECONSTITUTED SOLUTION: Performed by: SURGERY

## 2022-04-14 PROCEDURE — 47563 LAPARO CHOLECYSTECTOMY/GRAPH: CPT | Performed by: SURGERY

## 2022-04-14 PROCEDURE — 25010000002 KETOROLAC TROMETHAMINE PER 15 MG: Performed by: NURSE ANESTHETIST, CERTIFIED REGISTERED

## 2022-04-14 PROCEDURE — C1887 CATHETER, GUIDING: HCPCS | Performed by: SURGERY

## 2022-04-14 PROCEDURE — 25010000002 HYDROMORPHONE 1 MG/ML SOLUTION: Performed by: NURSE ANESTHETIST, CERTIFIED REGISTERED

## 2022-04-14 PROCEDURE — 25010000002 DEXAMETHASONE PER 1 MG: Performed by: NURSE ANESTHETIST, CERTIFIED REGISTERED

## 2022-04-14 PROCEDURE — 25010000002 FENTANYL CITRATE (PF) 50 MCG/ML SOLUTION: Performed by: NURSE ANESTHETIST, CERTIFIED REGISTERED

## 2022-04-14 PROCEDURE — 88304 TISSUE EXAM BY PATHOLOGIST: CPT | Performed by: SURGERY

## 2022-04-14 PROCEDURE — 80053 COMPREHEN METABOLIC PANEL: CPT | Performed by: SURGERY

## 2022-04-14 PROCEDURE — 25010000002 IOPAMIDOL 61 % SOLUTION: Performed by: SURGERY

## 2022-04-14 PROCEDURE — 25010000002 NEOSTIGMINE 10 MG/10ML SOLUTION: Performed by: NURSE ANESTHETIST, CERTIFIED REGISTERED

## 2022-04-14 DEVICE — ABSORBABLE HEMOSTAT (OXIDIZED REGENERATED CELLULOSE, U.S.P.)
Type: IMPLANTABLE DEVICE | Site: ABDOMEN | Status: FUNCTIONAL
Brand: SURGICEL

## 2022-04-14 DEVICE — FLOSEAL HEMOSTATIC MATRIX, 5ML
Type: IMPLANTABLE DEVICE | Site: ABDOMEN | Status: FUNCTIONAL
Brand: FLOSEAL HEMOSTATIC MATRIX

## 2022-04-14 DEVICE — LIGAMAX 5 MM ENDOSCOPIC MULTIPLE CLIP APPLIER
Type: IMPLANTABLE DEVICE | Site: ABDOMEN | Status: FUNCTIONAL
Brand: LIGAMAX

## 2022-04-14 RX ORDER — ONDANSETRON 2 MG/ML
4 INJECTION INTRAMUSCULAR; INTRAVENOUS ONCE AS NEEDED
Status: COMPLETED | OUTPATIENT
Start: 2022-04-14 | End: 2022-04-14

## 2022-04-14 RX ORDER — MAGNESIUM HYDROXIDE 1200 MG/15ML
LIQUID ORAL AS NEEDED
Status: DISCONTINUED | OUTPATIENT
Start: 2022-04-14 | End: 2022-04-14 | Stop reason: HOSPADM

## 2022-04-14 RX ORDER — FENTANYL CITRATE 50 UG/ML
INJECTION, SOLUTION INTRAMUSCULAR; INTRAVENOUS AS NEEDED
Status: DISCONTINUED | OUTPATIENT
Start: 2022-04-14 | End: 2022-04-14 | Stop reason: SURG

## 2022-04-14 RX ORDER — FAMOTIDINE 10 MG/ML
20 INJECTION, SOLUTION INTRAVENOUS
Status: COMPLETED | OUTPATIENT
Start: 2022-04-14 | End: 2022-04-14

## 2022-04-14 RX ORDER — NEOSTIGMINE METHYLSULFATE 1 MG/ML
INJECTION, SOLUTION INTRAVENOUS AS NEEDED
Status: DISCONTINUED | OUTPATIENT
Start: 2022-04-14 | End: 2022-04-14 | Stop reason: SURG

## 2022-04-14 RX ORDER — GLYCOPYRROLATE 0.2 MG/ML
INJECTION INTRAMUSCULAR; INTRAVENOUS AS NEEDED
Status: DISCONTINUED | OUTPATIENT
Start: 2022-04-14 | End: 2022-04-14 | Stop reason: SURG

## 2022-04-14 RX ORDER — MIDAZOLAM HYDROCHLORIDE 2 MG/2ML
1 INJECTION, SOLUTION INTRAMUSCULAR; INTRAVENOUS
Status: COMPLETED | OUTPATIENT
Start: 2022-04-14 | End: 2022-04-14

## 2022-04-14 RX ORDER — HYDROCODONE BITARTRATE AND ACETAMINOPHEN 5; 325 MG/1; MG/1
1 TABLET ORAL EVERY 4 HOURS PRN
Qty: 30 TABLET | Refills: 0 | OUTPATIENT
Start: 2022-04-14 | End: 2022-04-21

## 2022-04-14 RX ORDER — SODIUM CHLORIDE 9 MG/ML
40 INJECTION, SOLUTION INTRAVENOUS AS NEEDED
Status: DISCONTINUED | OUTPATIENT
Start: 2022-04-14 | End: 2022-04-14 | Stop reason: HOSPADM

## 2022-04-14 RX ORDER — SODIUM CHLORIDE, SODIUM LACTATE, POTASSIUM CHLORIDE, CALCIUM CHLORIDE 600; 310; 30; 20 MG/100ML; MG/100ML; MG/100ML; MG/100ML
100 INJECTION, SOLUTION INTRAVENOUS CONTINUOUS
Status: DISCONTINUED | OUTPATIENT
Start: 2022-04-14 | End: 2022-04-14 | Stop reason: HOSPADM

## 2022-04-14 RX ORDER — FENTANYL CITRATE 50 UG/ML
25 INJECTION, SOLUTION INTRAMUSCULAR; INTRAVENOUS
Status: DISCONTINUED | OUTPATIENT
Start: 2022-04-14 | End: 2022-04-14 | Stop reason: HOSPADM

## 2022-04-14 RX ORDER — SODIUM CHLORIDE 9 MG/ML
INJECTION, SOLUTION INTRAVENOUS AS NEEDED
Status: DISCONTINUED | OUTPATIENT
Start: 2022-04-14 | End: 2022-04-14 | Stop reason: HOSPADM

## 2022-04-14 RX ORDER — SODIUM CHLORIDE, SODIUM LACTATE, POTASSIUM CHLORIDE, CALCIUM CHLORIDE 600; 310; 30; 20 MG/100ML; MG/100ML; MG/100ML; MG/100ML
9 INJECTION, SOLUTION INTRAVENOUS CONTINUOUS
Status: DISCONTINUED | OUTPATIENT
Start: 2022-04-14 | End: 2022-04-14 | Stop reason: HOSPADM

## 2022-04-14 RX ORDER — DEXAMETHASONE SODIUM PHOSPHATE 4 MG/ML
4 INJECTION, SOLUTION INTRA-ARTICULAR; INTRALESIONAL; INTRAMUSCULAR; INTRAVENOUS; SOFT TISSUE ONCE AS NEEDED
Status: COMPLETED | OUTPATIENT
Start: 2022-04-14 | End: 2022-04-14

## 2022-04-14 RX ORDER — KETAMINE HYDROCHLORIDE 10 MG/ML
INJECTION INTRAMUSCULAR; INTRAVENOUS AS NEEDED
Status: DISCONTINUED | OUTPATIENT
Start: 2022-04-14 | End: 2022-04-14 | Stop reason: SURG

## 2022-04-14 RX ORDER — ROCURONIUM BROMIDE 10 MG/ML
INJECTION, SOLUTION INTRAVENOUS AS NEEDED
Status: DISCONTINUED | OUTPATIENT
Start: 2022-04-14 | End: 2022-04-14 | Stop reason: SURG

## 2022-04-14 RX ORDER — LIDOCAINE HYDROCHLORIDE 10 MG/ML
0.5 INJECTION, SOLUTION EPIDURAL; INFILTRATION; INTRACAUDAL; PERINEURAL ONCE AS NEEDED
Status: DISCONTINUED | OUTPATIENT
Start: 2022-04-14 | End: 2022-04-14 | Stop reason: HOSPADM

## 2022-04-14 RX ORDER — SODIUM CHLORIDE 0.9 % (FLUSH) 0.9 %
10 SYRINGE (ML) INJECTION AS NEEDED
Status: DISCONTINUED | OUTPATIENT
Start: 2022-04-14 | End: 2022-04-14 | Stop reason: HOSPADM

## 2022-04-14 RX ORDER — LIDOCAINE HYDROCHLORIDE 20 MG/ML
INJECTION, SOLUTION INFILTRATION; PERINEURAL AS NEEDED
Status: DISCONTINUED | OUTPATIENT
Start: 2022-04-14 | End: 2022-04-14 | Stop reason: SURG

## 2022-04-14 RX ORDER — KETOROLAC TROMETHAMINE 30 MG/ML
INJECTION, SOLUTION INTRAMUSCULAR; INTRAVENOUS AS NEEDED
Status: DISCONTINUED | OUTPATIENT
Start: 2022-04-14 | End: 2022-04-14 | Stop reason: SURG

## 2022-04-14 RX ORDER — SUCCINYLCHOLINE CHLORIDE 20 MG/ML
INJECTION INTRAMUSCULAR; INTRAVENOUS AS NEEDED
Status: DISCONTINUED | OUTPATIENT
Start: 2022-04-14 | End: 2022-04-14 | Stop reason: SURG

## 2022-04-14 RX ORDER — PROPOFOL 10 MG/ML
VIAL (ML) INTRAVENOUS AS NEEDED
Status: DISCONTINUED | OUTPATIENT
Start: 2022-04-14 | End: 2022-04-14 | Stop reason: SURG

## 2022-04-14 RX ORDER — CEFAZOLIN SODIUM 2 G/50ML
2 SOLUTION INTRAVENOUS ONCE
Status: COMPLETED | OUTPATIENT
Start: 2022-04-14 | End: 2022-04-14

## 2022-04-14 RX ORDER — OXYCODONE HYDROCHLORIDE AND ACETAMINOPHEN 5; 325 MG/1; MG/1
1 TABLET ORAL ONCE AS NEEDED
Status: COMPLETED | OUTPATIENT
Start: 2022-04-14 | End: 2022-04-14

## 2022-04-14 RX ORDER — DIPHENHYDRAMINE HYDROCHLORIDE 50 MG/ML
12.5 INJECTION INTRAMUSCULAR; INTRAVENOUS
Status: DISCONTINUED | OUTPATIENT
Start: 2022-04-14 | End: 2022-04-14 | Stop reason: HOSPADM

## 2022-04-14 RX ORDER — SODIUM CHLORIDE 0.9 % (FLUSH) 0.9 %
10 SYRINGE (ML) INJECTION EVERY 12 HOURS SCHEDULED
Status: DISCONTINUED | OUTPATIENT
Start: 2022-04-14 | End: 2022-04-14 | Stop reason: HOSPADM

## 2022-04-14 RX ORDER — EPHEDRINE SULFATE 50 MG/ML
INJECTION, SOLUTION INTRAVENOUS AS NEEDED
Status: DISCONTINUED | OUTPATIENT
Start: 2022-04-14 | End: 2022-04-14 | Stop reason: SURG

## 2022-04-14 RX ADMIN — MIDAZOLAM HYDROCHLORIDE 1 MG: 1 INJECTION, SOLUTION INTRAMUSCULAR; INTRAVENOUS at 07:53

## 2022-04-14 RX ADMIN — MIDAZOLAM HYDROCHLORIDE 1 MG: 1 INJECTION, SOLUTION INTRAMUSCULAR; INTRAVENOUS at 07:42

## 2022-04-14 RX ADMIN — DEXAMETHASONE SODIUM PHOSPHATE 4 MG: 4 INJECTION, SOLUTION INTRAMUSCULAR; INTRAVENOUS at 07:42

## 2022-04-14 RX ADMIN — HYDROMORPHONE HYDROCHLORIDE 0.5 MG: 1 INJECTION, SOLUTION INTRAMUSCULAR; INTRAVENOUS; SUBCUTANEOUS at 10:05

## 2022-04-14 RX ADMIN — FENTANYL CITRATE 50 MCG: 50 INJECTION INTRAMUSCULAR; INTRAVENOUS at 08:26

## 2022-04-14 RX ADMIN — LIDOCAINE HYDROCHLORIDE 100 MG: 20 INJECTION, SOLUTION INFILTRATION; PERINEURAL at 08:26

## 2022-04-14 RX ADMIN — ROCURONIUM BROMIDE 35 MG: 10 INJECTION INTRAVENOUS at 08:35

## 2022-04-14 RX ADMIN — HYDROMORPHONE HYDROCHLORIDE 0.5 MG: 1 INJECTION, SOLUTION INTRAMUSCULAR; INTRAVENOUS; SUBCUTANEOUS at 09:47

## 2022-04-14 RX ADMIN — GLYCOPYRROLATE 0.2 MG: 0.2 INJECTION INTRAMUSCULAR; INTRAVENOUS at 08:30

## 2022-04-14 RX ADMIN — GLYCOPYRROLATE 0.6 MG: 0.2 INJECTION INTRAMUSCULAR; INTRAVENOUS at 09:14

## 2022-04-14 RX ADMIN — PROPOFOL 120 MG: 10 INJECTION, EMULSION INTRAVENOUS at 08:26

## 2022-04-14 RX ADMIN — FENTANYL CITRATE 50 MCG: 50 INJECTION INTRAMUSCULAR; INTRAVENOUS at 09:26

## 2022-04-14 RX ADMIN — OXYCODONE HYDROCHLORIDE AND ACETAMINOPHEN 1 TABLET: 5; 325 TABLET ORAL at 10:06

## 2022-04-14 RX ADMIN — KETAMINE HYDROCHLORIDE 20 MG: 10 INJECTION, SOLUTION INTRAMUSCULAR; INTRAVENOUS at 08:26

## 2022-04-14 RX ADMIN — ROCURONIUM BROMIDE 5 MG: 10 INJECTION INTRAVENOUS at 08:26

## 2022-04-14 RX ADMIN — FAMOTIDINE 20 MG: 10 INJECTION, SOLUTION INTRAVENOUS at 07:42

## 2022-04-14 RX ADMIN — SUCCINYLCHOLINE CHLORIDE 140 MG: 20 INJECTION, SOLUTION INTRAMUSCULAR; INTRAVENOUS at 08:26

## 2022-04-14 RX ADMIN — EPHEDRINE SULFATE 10 MG: 50 INJECTION, SOLUTION INTRAVENOUS at 08:35

## 2022-04-14 RX ADMIN — ONDANSETRON 4 MG: 2 INJECTION INTRAMUSCULAR; INTRAVENOUS at 07:42

## 2022-04-14 RX ADMIN — KETOROLAC TROMETHAMINE 30 MG: 30 INJECTION, SOLUTION INTRAMUSCULAR; INTRAVENOUS at 09:15

## 2022-04-14 RX ADMIN — ONDANSETRON 4 MG: 2 INJECTION INTRAMUSCULAR; INTRAVENOUS at 10:20

## 2022-04-14 RX ADMIN — NEOSTIGMINE METHYLSULFATE 4 MG: 1 INJECTION INTRAVENOUS at 09:14

## 2022-04-14 RX ADMIN — SODIUM CHLORIDE, POTASSIUM CHLORIDE, SODIUM LACTATE AND CALCIUM CHLORIDE 9 ML/HR: 600; 310; 30; 20 INJECTION, SOLUTION INTRAVENOUS at 06:35

## 2022-04-14 RX ADMIN — CEFAZOLIN SODIUM 2 G: 2 SOLUTION INTRAVENOUS at 08:30

## 2022-04-14 NOTE — ANESTHESIA PROCEDURE NOTES
Airway  Urgency: elective    Date/Time: 4/14/2022 8:28 AM  Airway not difficult    General Information and Staff    Patient location during procedure: OR  CRNA: Rocky Matthews CRNA    Indications and Patient Condition  Indications for airway management: airway protection    Preoxygenated: yes  MILS maintained throughout  Mask difficulty assessment: 0 - not attempted    Final Airway Details  Final airway type: endotracheal airway      Successful airway: ETT  Cuffed: yes   Successful intubation technique: direct laryngoscopy  Endotracheal tube insertion site: oral  Blade: Ng  Blade size: 2  ETT size (mm): 7.5  Cormack-Lehane Classification: grade I - full view of glottis  Placement verified by: chest auscultation and capnometry   Measured from: lips  ETT/EBT  to lips (cm): 22  Number of attempts at approach: 1  Assessment: lips, teeth, and gum same as pre-op and atraumatic intubation

## 2022-04-14 NOTE — ANESTHESIA POSTPROCEDURE EVALUATION
Patient: Aruna Hammonds    Procedure Summary     Date: 04/14/22 Room / Location:  [facility] OR 1 /  [facility]    Anesthesia Start: 0819 Anesthesia Stop: 0930    Procedure: CHOLECYSTECTOMY LAPAROSCOPIC INTRAOPERATIVE CHOLANGIOGRAM (N/A Abdomen) Diagnosis:       Gallbladder sludge      (Gallbladder sludge [K82.8])    Surgeons: Mimi Sahu DO Provider: Rocky Matthews CRNA    Anesthesia Type: general ASA Status: 2          Anesthesia Type: general    Vitals  Vitals Value Taken Time   BP 97/47 04/14/22 1040   Temp 98.6 °F (37 °C) 04/14/22 0930   Pulse 65 04/14/22 1041   Resp 16 04/14/22 1040   SpO2 98 % 04/14/22 1043   Vitals shown include unvalidated device data.        Post Anesthesia Care and Evaluation    Patient location during evaluation: PHASE II  Patient participation: complete - patient participated  Level of consciousness: awake  Pain score: 6  Pain management: satisfactory to patient  Airway patency: patent  Anesthetic complications: No anesthetic complications  PONV Status: controlled  Cardiovascular status: acceptable  Respiratory status: acceptable  Hydration status: acceptable

## 2022-04-14 NOTE — ANESTHESIA PREPROCEDURE EVALUATION
Anesthesia Evaluation     Patient summary reviewed and Nursing notes reviewed   no history of anesthetic complications:  NPO Solid Status: > 8 hours  NPO Liquid Status: > 8 hours           Airway   Mallampati: II  TM distance: >3 FB  Neck ROM: full  Dental    (+) edentulous    Pulmonary - negative pulmonary ROS    breath sounds clear to auscultation  Cardiovascular   Exercise tolerance: good (4-7 METS)    Rhythm: regular  Rate: normal    (+) hypertension, dysrhythmias Bradycardia,       Neuro/Psych  (+) headaches, psychiatric history Anxiety, Depression, Bipolar and PTSD,    GI/Hepatic/Renal/Endo    (+) obesity,   liver disease fatty liver disease, thyroid problem hypothyroidism    Musculoskeletal (-) negative ROS    Abdominal   (+) obese,    Substance History - negative use     OB/GYN negative ob/gyn ROS         Other - negative ROS                       Anesthesia Plan    ASA 2     general     intravenous induction     Anesthetic plan, all risks, benefits, and alternatives have been provided, discussed and informed consent has been obtained with: patient.  Use of blood products discussed with patient  Consented to blood products.       CODE STATUS:

## 2022-04-14 NOTE — OP NOTE
PREOPERATIVE DIAGNOSIS: Biliary dyskinesia  POSTOPERATIVE DIAGNOSIS: Biliary dyskinesia   PROCEDURE: Laparoscopic cholecystectomy with Intraoperative cholangiogram  SURGEON/STAFF: Adelina  ASST: Halle Richardson-a certified first assistant who was necessary for retraction, camera operation, and suturing  SPECIMENS: Gallbladder.  INTRAOPERATIVE COMPLICATIONS: None.  ANESTHESIA: General.  BLOOD LOSS:  20 ml  COUNTS: Needle and sponge counts correct.     Clinical Note: This very pleasant patient presented to my office with the aforementioned complaints.  Benefits and risks of a laparoscopic cholecystectomy with intraoperative cholangiogram possible open procedure were discussed.  Benefits and risks not limited to but including:Bleeding, infection, hernia formation, injury to intra-abdominal structures, bile leak, biloma, intra-abdominal abscess, DVT, PE, atelectasis pneumonia, anesthesia complications. She agreed and was consented for operative management without duress.     PROCEDURE: The patient was brought to the operating room in stable condition. Perioperative antibiotics were given and sequential compression devices applied. She was then laid supine on the operating room table. General anesthesia was induced by the Anesthesia Service without difficulty.     At this time, the patient's abdomen was accessed at the level of the umbilicus in open cutdown technique and insertion of a 12-mm trocar. The abdomen was then insufflated. Brief survey of the abdominal cavity revealed no evidence of injury from insertion of the trocar, or no other intraabdominal pathology. At this time the patient was placed in steep reverse Trendelenburg and a slightly left-side down position. Three additional 5-mm trocars were placed, all in the standard position. This was under direct vision.       The peritoneal attachment of the gallbladder at the level of the infundibulum was scored from laterally to medially, terminating at the level of  the hepatic edge. The peritoneum was gently stripped down, exposing the underlying cystic artery and cystic duct. This was also done on the lateral side of the gallbladder by reflecting the gallbladder medially. The peritoneal attachment here was again gently dissected inferiorly. After completely exposing the cystic duct as well as cystic artery, a retro-cystic window was created. These 2 structures, the cystic duct and cystic artery, were further delineated. A firm critical view was obtained, visualizing healthy-appearing hepatic tissue behind the 2 structures, with no evidence of looping, bridging or tenting of the common bile duct.     A clip was placed distally at the cystic duct and a cystic duct incision with laparoscopic scissors was performed. A percutaneous cholangio-catheter was inserted into the patient abdomen. The catheter was placed and secured into the cystic duct. Cholangiogram was performed that showed normal cystic duct, normal hepatic ducts, normal common bile duct with good spillage of dye into the duodenum with no evidence of any obstructions. The cholangiocatheter was removed and the distal portion of thecystic duct was then clipped twice and ligated.  The cystic artery was then triply clipped and ligated.  It was inspected and seen to be in intact. The gallbladder was then carefully dissected off the hepatic bed using hook electrocautery. It was firmly adherent to the underlying bed, and an effort careful and meticulous hemostasis was undertaken. The gallbladder, after being removed from the hepatic bed, was placed in an EndoCatch bag. It was removed through the umbilical trocar without difficulty.    Next, the umbilical trocar was reinserted into the abdomen and the liver bed was inspected. Hemostasis was perfected using Surgicel and FloSeal. Copious irrigation was carried out. The clips were intact and there was no bleeding or bile leakage noted.  The trocars were then removed under direct  vision, air was deflated from the abdomen, and a [place] trocar site fascia was closed with 0 Vicryl suture.  The incisions were then closed with 40 Vicryls suture. Sterile dressings were applied.    Anesthesia was reversed, the ET tube and OG tube were removed.  And the patient was taken into the recovery area in stable postoperative condition having tolerated the procedure well.    LISA Sahu DO

## 2022-04-15 LAB
LAB AP CASE REPORT: NORMAL
PATH REPORT.FINAL DX SPEC: NORMAL
PATH REPORT.GROSS SPEC: NORMAL

## 2022-04-21 ENCOUNTER — HOSPITAL ENCOUNTER (EMERGENCY)
Facility: HOSPITAL | Age: 42
Discharge: HOME | End: 2022-04-21
Attending: EMERGENCY MEDICINE | Admitting: EMERGENCY MEDICINE

## 2022-04-21 ENCOUNTER — APPOINTMENT (OUTPATIENT)
Dept: GENERAL RADIOLOGY | Facility: HOSPITAL | Age: 42
End: 2022-04-21

## 2022-04-21 ENCOUNTER — TELEPHONE (OUTPATIENT)
Dept: SURGERY | Facility: CLINIC | Age: 42
End: 2022-04-21

## 2022-04-21 VITALS
TEMPERATURE: 98.3 F | WEIGHT: 209 LBS | HEIGHT: 69 IN | BODY MASS INDEX: 30.96 KG/M2 | SYSTOLIC BLOOD PRESSURE: 110 MMHG | RESPIRATION RATE: 12 BRPM | DIASTOLIC BLOOD PRESSURE: 65 MMHG | HEART RATE: 64 BPM | OXYGEN SATURATION: 100 %

## 2022-04-21 DIAGNOSIS — K59.00 CONSTIPATION, UNSPECIFIED CONSTIPATION TYPE: Primary | ICD-10-CM

## 2022-04-21 DIAGNOSIS — G89.18 POSTOPERATIVE PAIN: ICD-10-CM

## 2022-04-21 LAB
ALBUMIN SERPL-MCNC: 3.9 G/DL (ref 3.5–5.2)
ALBUMIN/GLOB SERPL: 1.3 G/DL
ALP SERPL-CCNC: 118 U/L (ref 39–117)
ALT SERPL W P-5'-P-CCNC: 22 U/L (ref 1–33)
ANION GAP SERPL CALCULATED.3IONS-SCNC: 10.9 MMOL/L (ref 5–15)
AST SERPL-CCNC: 25 U/L (ref 1–32)
BACTERIA UR QL AUTO: ABNORMAL /HPF
BASOPHILS # BLD AUTO: 0.07 10*3/MM3 (ref 0–0.2)
BASOPHILS NFR BLD AUTO: 0.7 % (ref 0–1.5)
BILIRUB SERPL-MCNC: 0.2 MG/DL (ref 0–1.2)
BILIRUB UR QL STRIP: NEGATIVE
BUN SERPL-MCNC: 5 MG/DL (ref 6–20)
BUN/CREAT SERPL: 5.4 (ref 7–25)
CALCIUM SPEC-SCNC: 8.9 MG/DL (ref 8.6–10.5)
CHLORIDE SERPL-SCNC: 108 MMOL/L (ref 98–107)
CLARITY UR: CLEAR
CO2 SERPL-SCNC: 20.1 MMOL/L (ref 22–29)
COLOR UR: YELLOW
CREAT SERPL-MCNC: 0.93 MG/DL (ref 0.57–1)
DEPRECATED RDW RBC AUTO: 42.9 FL (ref 37–54)
EGFRCR SERPLBLD CKD-EPI 2021: 79.4 ML/MIN/1.73
EOSINOPHIL # BLD AUTO: 0.21 10*3/MM3 (ref 0–0.4)
EOSINOPHIL NFR BLD AUTO: 2.1 % (ref 0.3–6.2)
ERYTHROCYTE [DISTWIDTH] IN BLOOD BY AUTOMATED COUNT: 13.2 % (ref 12.3–15.4)
GLOBULIN UR ELPH-MCNC: 3.1 GM/DL
GLUCOSE SERPL-MCNC: 91 MG/DL (ref 65–99)
GLUCOSE UR STRIP-MCNC: NEGATIVE MG/DL
HCT VFR BLD AUTO: 41.6 % (ref 34–46.6)
HGB BLD-MCNC: 13.3 G/DL (ref 12–15.9)
HGB UR QL STRIP.AUTO: ABNORMAL
HYALINE CASTS UR QL AUTO: ABNORMAL /LPF
IMM GRANULOCYTES # BLD AUTO: 0.01 10*3/MM3 (ref 0–0.05)
IMM GRANULOCYTES NFR BLD AUTO: 0.1 % (ref 0–0.5)
KETONES UR QL STRIP: NEGATIVE
LEUKOCYTE ESTERASE UR QL STRIP.AUTO: NEGATIVE
LIPASE SERPL-CCNC: 49 U/L (ref 13–60)
LYMPHOCYTES # BLD AUTO: 3.76 10*3/MM3 (ref 0.7–3.1)
LYMPHOCYTES NFR BLD AUTO: 37.5 % (ref 19.6–45.3)
MCH RBC QN AUTO: 28.2 PG (ref 26.6–33)
MCHC RBC AUTO-ENTMCNC: 32 G/DL (ref 31.5–35.7)
MCV RBC AUTO: 88.3 FL (ref 79–97)
MONOCYTES # BLD AUTO: 0.65 10*3/MM3 (ref 0.1–0.9)
MONOCYTES NFR BLD AUTO: 6.5 % (ref 5–12)
NEUTROPHILS NFR BLD AUTO: 5.34 10*3/MM3 (ref 1.7–7)
NEUTROPHILS NFR BLD AUTO: 53.1 % (ref 42.7–76)
NITRITE UR QL STRIP: NEGATIVE
NRBC BLD AUTO-RTO: 0 /100 WBC (ref 0–0.2)
PH UR STRIP.AUTO: 7 [PH] (ref 4.5–8)
PLATELET # BLD AUTO: 339 10*3/MM3 (ref 140–450)
PMV BLD AUTO: 10.5 FL (ref 6–12)
POTASSIUM SERPL-SCNC: 3.3 MMOL/L (ref 3.5–5.2)
PROT SERPL-MCNC: 7 G/DL (ref 6–8.5)
PROT UR QL STRIP: NEGATIVE
RBC # BLD AUTO: 4.71 10*6/MM3 (ref 3.77–5.28)
RBC # UR STRIP: ABNORMAL /HPF
REF LAB TEST METHOD: ABNORMAL
SODIUM SERPL-SCNC: 139 MMOL/L (ref 136–145)
SP GR UR STRIP: 1.01 (ref 1–1.03)
SQUAMOUS #/AREA URNS HPF: ABNORMAL /HPF
UROBILINOGEN UR QL STRIP: ABNORMAL
WBC # UR STRIP: ABNORMAL /HPF
WBC NRBC COR # BLD: 10.04 10*3/MM3 (ref 3.4–10.8)

## 2022-04-21 PROCEDURE — 99282 EMERGENCY DEPT VISIT SF MDM: CPT | Performed by: EMERGENCY MEDICINE

## 2022-04-21 PROCEDURE — 81001 URINALYSIS AUTO W/SCOPE: CPT | Performed by: EMERGENCY MEDICINE

## 2022-04-21 PROCEDURE — 74022 RADEX COMPL AQT ABD SERIES: CPT

## 2022-04-21 PROCEDURE — 25010000002 DICYCLOMINE PER 20 MG: Performed by: EMERGENCY MEDICINE

## 2022-04-21 PROCEDURE — 85025 COMPLETE CBC W/AUTO DIFF WBC: CPT | Performed by: EMERGENCY MEDICINE

## 2022-04-21 PROCEDURE — 83690 ASSAY OF LIPASE: CPT | Performed by: EMERGENCY MEDICINE

## 2022-04-21 PROCEDURE — 80053 COMPREHEN METABOLIC PANEL: CPT | Performed by: EMERGENCY MEDICINE

## 2022-04-21 PROCEDURE — 96372 THER/PROPH/DIAG INJ SC/IM: CPT

## 2022-04-21 PROCEDURE — 99283 EMERGENCY DEPT VISIT LOW MDM: CPT

## 2022-04-21 RX ORDER — NAPROXEN 500 MG/1
500 TABLET ORAL 2 TIMES DAILY PRN
Qty: 30 TABLET | Refills: 0 | Status: SHIPPED | OUTPATIENT
Start: 2022-04-21 | End: 2022-05-24 | Stop reason: HOSPADM

## 2022-04-21 RX ORDER — DICYCLOMINE HYDROCHLORIDE 10 MG/ML
20 INJECTION INTRAMUSCULAR ONCE
Status: COMPLETED | OUTPATIENT
Start: 2022-04-21 | End: 2022-04-21

## 2022-04-21 RX ORDER — MAGNESIUM CARB/ALUMINUM HYDROX 105-160MG
300 TABLET,CHEWABLE ORAL ONCE
Status: COMPLETED | OUTPATIENT
Start: 2022-04-21 | End: 2022-04-21

## 2022-04-21 RX ADMIN — MAGNESIUM CITRATE 296 ML: 1.75 LIQUID ORAL at 05:58

## 2022-04-21 RX ADMIN — DICYCLOMINE HYDROCHLORIDE 20 MG: 20 INJECTION, SOLUTION INTRAMUSCULAR at 05:09

## 2022-04-21 NOTE — DISCHARGE INSTRUCTIONS
Take the naproxen as needed as prescribed for the pain.  Take the Tylenol if naproxen is not controlling your pain.  Take it as directed.  Follow-up with  tomorrow or Monday.  Return to the emergency department if there is worsening pain, vomiting, fever, worse in any way at all.

## 2022-04-21 NOTE — ED PROVIDER NOTES
Subjective   History of Present Illness  History of Present Illness    Chief complaint: Abdominal pain    Location: Diffuse    Quality/Severity: Moderate    Timing/Onset/Duration: Present for the last 4 days    Modifying Factors: Worse tonight, patient had a pain pill    Associated Symptoms: No headache.  No fever or chills.  No cough.  Patient feels like she has something stuck in her throat.  No earache or nasal congestion.  No chest pain or shortness of breath.  No diarrhea or burning when she urinates.  No bowel movement for the last 4 days.    Narrative: This 41-year-old white female with a history of hypertension, hysterectomy, colonoscopy, appendectomy, status postcholecystectomy April 14, 2022, who presents with abdominal pain.  The patient is a former smoker.  She does not drink alcohol.  Patient has chronic constipation.  She is on Linzess.    Surgeon: Adelina      Review of Systems   Constitutional: Negative for chills and fever.   HENT: Negative for congestion, ear pain and sore throat.    Respiratory: Negative for shortness of breath.    Cardiovascular: Negative for chest pain.   Gastrointestinal: Positive for abdominal pain. Negative for diarrhea.   Genitourinary: Negative for dysuria.   Skin: Negative for rash.   Neurological: Negative for headaches.        Medication List      ASK your doctor about these medications    amLODIPine 2.5 MG tablet  Commonly known as: NORVASC     gabapentin 100 MG capsule  Commonly known as: NEURONTIN     HYDROcodone-acetaminophen 5-325 MG per tablet  Commonly known as: Norco  Take 1 tablet by mouth Every 4 (Four) Hours As Needed for Moderate Pain  or Severe Pain.     hydrOXYzine 50 MG tablet  Commonly known as: ATARAX     levothyroxine 125 MCG tablet  Commonly known as: SYNTHROID, LEVOTHROID     linaclotide 145 MCG capsule capsule  Commonly known as: LINZESS  Take 1 capsule by mouth Every Morning Before Breakfast.     LITHIUM PO     topiramate 100 MG tablet  Commonly  known as: TOPAMAX            Past Medical History:   Diagnosis Date   • ADHD (attention deficit hyperactivity disorder)    • Bipolar 1 disorder (HCC)    • Depression    • Hypertension    • Hypothyroid    • Psychosis (HCC)    • PTSD (post-traumatic stress disorder)        Allergies   Allergen Reactions   • Sulfa Antibiotics Unknown - Low Severity     As a child       Past Surgical History:   Procedure Laterality Date   • APPENDECTOMY     • CHOLECYSTECTOMY WITH INTRAOPERATIVE CHOLANGIOGRAM N/A 2022    Procedure: CHOLECYSTECTOMY LAPAROSCOPIC INTRAOPERATIVE CHOLANGIOGRAM;  Surgeon: Mimi Sahu DO;  [facility]: [facility];  Service: General;  Laterality: N/A;   • COLONOSCOPY     • HYSTERECTOMY         Family History   Problem Relation Age of Onset   • Heart disease Mother 44   • Migraines Mother    • Breast cancer Mother    • Heart attack Mother 44   • Heart disease Father 46   • Hypertension Father    • Colon cancer Father    • Kidney disease Father    • Heart attack Father 65   • Stroke Father    • Diabetes Father    • Aneurysm Paternal Grandmother        Social History     Socioeconomic History   • Marital status:    Tobacco Use   • Smoking status: Former Smoker     Packs/day: 1.00     Types: Cigarettes     Quit date: 2022     Years since quittin.1   • Smokeless tobacco: Never Used   • Tobacco comment: caff use   Vaping Use   • Vaping Use: Never used   Substance and Sexual Activity   • Alcohol use: No   • Drug use: No   • Sexual activity: Defer           Objective   Physical Exam  Vitals (The temperature is 98.3 °F, pulse 64, respirations 12, /86, room air pulse ox 100%.) and nursing note reviewed.   Constitutional:       Appearance: She is well-developed.   HENT:      Head: Normocephalic and atraumatic.      Mouth/Throat:      Mouth: Mucous membranes are moist.      Pharynx: No pharyngeal swelling or oropharyngeal exudate.   Cardiovascular:      Rate and Rhythm: Normal rate and  regular rhythm.      Heart sounds: Normal heart sounds. No murmur heard.    No friction rub. No gallop.   Pulmonary:      Effort: Pulmonary effort is normal.      Breath sounds: Normal breath sounds.   Abdominal:      General: Abdomen is flat. Bowel sounds are normal.      Palpations: Abdomen is soft. There is no mass.      Tenderness: There is abdominal tenderness (Moderate diffuse tenderness). There is no guarding or rebound.      Hernia: No hernia is present.   Skin:     General: Skin is warm and dry.   Neurological:      General: No focal deficit present.      Mental Status: She is alert and oriented to person, place, and time.         Procedures           ED Course  ED Course as of 04/21/22 0546   Thu Apr 21, 2022   0529 The CBC is unremarkable.  The urinalysis shows 3-5 red blood cells, otherwise unremarkable. [RC]   0538 The lipase is normal. [RC]   0543 The potassium is 3.3.  The chloride is 108.  The CO2 is 20.  The alk phos is 118.  The CMP is otherwise unremarkable. [RC]      ED Course User Index  [RC] Zen Weeks MD    05:47 EDT, 04/21/22:  The patient states she feels better.  She rates her pain 6/10.  Her vital signs were reviewed and are stable.  Abdominal exam: Soft, minimal to moderate diffuse tenderness, no rebound, no guarding, no masses, positive bowel sounds.       05:49 EDT, 04/21/22:  I spoke with , she agreed with the plan to discharge the patient home.  The plan will be to give her mag citrate.  Patient will be given a prescription for naproxen.  She should take the naproxen as prescribed and take Tylenol as needed if naproxen is not controlling her pain.  Patient should follow-up with  in [place] tomorrow or Monday.  She should return to the emergency department if she is worse in any way at all, worsening pain, vomiting, fever, worsening way at all.  All the patient's questions were answered she will be discharged in good condition.  The provisional  diagnosis is constipation, postop pain.                                      MDM    Final diagnoses:   Constipation, unspecified constipation type   Postoperative pain       ED Disposition  ED Disposition     None          No follow-up provider specified.       Medication List      No changes were made to your prescriptions during this visit.          Zen Weeks MD  04/21/22 0601

## 2022-04-21 NOTE — TELEPHONE ENCOUNTER
0950 - Pt ret call.  States she is having a lot of abd pain and has 2 knots on abd.  Pt states she has not slept in 3 days.  Pt informed that Dr. Sahu would like to see pt in office on Monday.  Pt req appt tomorrow.  Appt given for [facility] location at 0800.  Pt instructed to return to ER if pain increases or other s/s appear.  Pt verbalizes understanding.    0830 - Per the direction of Dr. Sahu, courtesy call to pt for ER FU.  NA/NO VM.

## 2022-04-22 ENCOUNTER — OFFICE VISIT (OUTPATIENT)
Dept: SURGERY | Facility: CLINIC | Age: 42
End: 2022-04-22

## 2022-04-22 VITALS
RESPIRATION RATE: 16 BRPM | DIASTOLIC BLOOD PRESSURE: 72 MMHG | TEMPERATURE: 98.2 F | SYSTOLIC BLOOD PRESSURE: 128 MMHG | HEART RATE: 48 BPM

## 2022-04-22 DIAGNOSIS — Z90.49 STATUS POST LAPAROSCOPIC CHOLECYSTECTOMY: Primary | ICD-10-CM

## 2022-04-22 DIAGNOSIS — K59.09 OTHER CONSTIPATION: ICD-10-CM

## 2022-04-22 PROBLEM — K82.8 GALLBLADDER SLUDGE: Status: RESOLVED | Noted: 2022-03-16 | Resolved: 2022-04-22

## 2022-04-22 PROCEDURE — 99024 POSTOP FOLLOW-UP VISIT: CPT | Performed by: SURGERY

## 2022-04-22 RX ORDER — LACTULOSE 10 G/15ML
10 SOLUTION ORAL DAILY
Status: DISCONTINUED | OUTPATIENT
Start: 2022-04-22 | End: 2023-01-12

## 2022-04-22 NOTE — PROGRESS NOTES
Aruna Hammonds 41 y.o. female presents for PO FU.  Pt c/o abd pain and she feels 2 knots on her abd.       HPI     Aruna presented to the ER yesterday.  Her X-rays showed colon distension and stool.  She did not take the magnesium citrate but did have a bowel movement yesterday.  She has always had issues with constipation.    Review of Systems        Past Medical History:   Diagnosis Date   • ADHD (attention deficit hyperactivity disorder)    • Bipolar 1 disorder (HCC)    • Depression    • Hypertension    • Hypothyroid    • Psychosis (HCC)    • PTSD (post-traumatic stress disorder)            Past Surgical History:   Procedure Laterality Date   • APPENDECTOMY     • CHOLECYSTECTOMY     • CHOLECYSTECTOMY WITH INTRAOPERATIVE CHOLANGIOGRAM N/A 04/14/2022    Procedure: CHOLECYSTECTOMY LAPAROSCOPIC INTRAOPERATIVE CHOLANGIOGRAM;  Surgeon: Mimi Sahu DO;  Location: [facility];  Service: General;  Laterality: N/A;   • COLONOSCOPY     • HYSTERECTOMY             Physical Exam    General:  Awake and alert with no acute distress  Eyes:  No icterus  Abdomen:  Soft, non-tender  Incision:  Clean, dry, intact    /72   Pulse (!) 48   Temp 98.2 °F (36.8 °C)   Resp 16         Diagnoses and all orders for this visit:    1. Status post laparoscopic cholecystectomy (Primary)    2. Other constipation    I reassured Aruna that her incisions were healing well.  I will see her at her regular follow-up on May 2.    Thank you for allowing me to participate in the care of this interesting patient.

## 2022-04-25 ENCOUNTER — APPOINTMENT (OUTPATIENT)
Dept: GENERAL RADIOLOGY | Facility: HOSPITAL | Age: 42
End: 2022-04-25

## 2022-05-02 ENCOUNTER — OFFICE VISIT (OUTPATIENT)
Dept: SURGERY | Facility: CLINIC | Age: 42
End: 2022-05-02

## 2022-05-02 VITALS
BODY MASS INDEX: 30.21 KG/M2 | HEART RATE: 60 BPM | HEIGHT: 69 IN | RESPIRATION RATE: 16 BRPM | SYSTOLIC BLOOD PRESSURE: 112 MMHG | DIASTOLIC BLOOD PRESSURE: 60 MMHG | WEIGHT: 204 LBS

## 2022-05-02 DIAGNOSIS — R11.0 NAUSEA: ICD-10-CM

## 2022-05-02 DIAGNOSIS — R10.12 LUQ PAIN: Primary | ICD-10-CM

## 2022-05-02 DIAGNOSIS — K62.5 RECTAL BLEEDING: ICD-10-CM

## 2022-05-02 DIAGNOSIS — R14.0 BLOATING: ICD-10-CM

## 2022-05-02 PROCEDURE — 99214 OFFICE O/P EST MOD 30 MIN: CPT | Performed by: SURGERY

## 2022-05-02 NOTE — H&P
"Aruna Hammonds 41 y.o. female presents for PO FU Lap Litzy.  Pt states she does not feel well.  States she spent most of the weekend in bed because she feels bad.  Reports she went to [facility] ER a few days ago. Reports labs and Ct abd performed.  Pt reports very little food/fld intake and infreq BM. Pt c/o abd pain describing it as \"a bad ache and pressure.\"       HPI   Above noted and agree.  Aruna is well-known to my service.  I performed a laparoscopic cholecystectomy on her.  I reviewed her ER visit.  I reviewed her CT scan which showed no complications from the surgery and showed an adnexal cyst.  She was having a lot of nausea and abdominal pain.  She was also having bloody stools.  The hemorrhoid treatment prescribed by the ER has been effective.  She had a colonoscopy greater than 10 years ago that was negative.  She still feels bad.  She has left upper quadrant pain.  She has lots of nausea and bloating.  She has no fevers or chills.  She has no chest pain or shortness of breath.  She does not smoke or use tobacco products.      Review of Systems   All other systems reviewed and are negative.          Past Medical History:   Diagnosis Date   • ADHD (attention deficit hyperactivity disorder)    • Bipolar 1 disorder (HCC)    • Depression    • Hypertension    • Hypothyroid    • Psychosis (HCC)    • PTSD (post-traumatic stress disorder)            Past Surgical History:   Procedure Laterality Date   • APPENDECTOMY     • CHOLECYSTECTOMY     • CHOLECYSTECTOMY WITH INTRAOPERATIVE CHOLANGIOGRAM N/A 04/14/2022    Procedure: CHOLECYSTECTOMY LAPAROSCOPIC INTRAOPERATIVE CHOLANGIOGRAM;  Surgeon: Mimi Sahu DO;  Location: [facility] [place];  Service: General;  Laterality: N/A;   • COLONOSCOPY     • HYSTERECTOMY             Physical Exam  Vitals and nursing note reviewed.   Constitutional:       Appearance: Normal appearance.   HENT:      Head: Normocephalic and atraumatic.   Cardiovascular:      Rate " "and Rhythm: Normal rate and regular rhythm.   Pulmonary:      Effort: Pulmonary effort is normal.      Breath sounds: Normal breath sounds.   Abdominal:      General: Bowel sounds are normal.      Palpations: Abdomen is soft.      Comments: Incisions healing well without signs of infection   Musculoskeletal:         General: No swelling or tenderness.   Skin:     General: Skin is warm and dry.   Neurological:      General: No focal deficit present.      Mental Status: She is alert and oriented to person, place, and time.   Psychiatric:         Mood and Affect: Mood normal.         Behavior: Behavior normal.         No debilities noted    /60   Pulse 60   Resp 16   Ht 175.3 cm (69\")   Wt 92.5 kg (204 lb)   BMI 30.13 kg/m²         Diagnoses and all orders for this visit:    1. LUQ pain (Primary)    2. Nausea    3. Rectal bleeding    4. Bloating    I discussed with Aruna the benefits and risks of performing an EGD and colonoscopy.  Benefits and risks were not limited to but including bleeding, infection, perforation, complications of anesthesia, aspiration.  She appeared to understand and is willing to proceed.    Thank you for allowing me to participate in the care of this interesting patient.            "

## 2022-05-02 NOTE — PROGRESS NOTES
"Aruna Hammonds 41 y.o. female presents for PO FU Lap Litzy.  Pt states she does not feel well.  States she spent most of the weekend in bed because she feels bad.  Reports she went to [facility] ER a few days ago. Reports labs and Ct abd performed.  Pt reports very little food/fld intake and infreq BM. Pt c/o abd pain describing it as \"a bad ache and pressure.\"       HPI   Above noted and agree.  Aruna is well-known to my service.  I performed a laparoscopic cholecystectomy on her.  I reviewed her ER visit.  I reviewed her CT scan which showed no complications from the surgery and showed an adnexal cyst.  She was having a lot of nausea and abdominal pain.  She was also having bloody stools.  The hemorrhoid treatment prescribed by the ER has been effective.  She had a colonoscopy greater than 10 years ago that was negative.  She still feels bad.  She has left upper quadrant pain.  She has lots of nausea and bloating.  She has no fevers or chills.  She has no chest pain or shortness of breath.  She does not smoke or use tobacco products.      Review of Systems   All other systems reviewed and are negative.          Past Medical History:   Diagnosis Date   • ADHD (attention deficit hyperactivity disorder)    • Bipolar 1 disorder (HCC)    • Depression    • Hypertension    • Hypothyroid    • Psychosis (HCC)    • PTSD (post-traumatic stress disorder)            Past Surgical History:   Procedure Laterality Date   • APPENDECTOMY     • CHOLECYSTECTOMY     • CHOLECYSTECTOMY WITH INTRAOPERATIVE CHOLANGIOGRAM N/A 04/14/2022    Procedure: CHOLECYSTECTOMY LAPAROSCOPIC INTRAOPERATIVE CHOLANGIOGRAM;  Surgeon: Mimi Sahu DO;  Location: [facility];  Service: General;  Laterality: N/A;   • COLONOSCOPY     • HYSTERECTOMY             Physical Exam  Vitals and nursing note reviewed.   Constitutional:       Appearance: Normal appearance.   HENT:      Head: Normocephalic and atraumatic.   Cardiovascular:      Rate " "and Rhythm: Normal rate and regular rhythm.   Pulmonary:      Effort: Pulmonary effort is normal.      Breath sounds: Normal breath sounds.   Abdominal:      General: Bowel sounds are normal.      Palpations: Abdomen is soft.      Comments: Incisions healing well without signs of infection   Musculoskeletal:         General: No swelling or tenderness.   Skin:     General: Skin is warm and dry.   Neurological:      General: No focal deficit present.      Mental Status: She is alert and oriented to person, place, and time.   Psychiatric:         Mood and Affect: Mood normal.         Behavior: Behavior normal.         No debilities noted    /60   Pulse 60   Resp 16   Ht 175.3 cm (69\")   Wt 92.5 kg (204 lb)   BMI 30.13 kg/m²         Diagnoses and all orders for this visit:    1. LUQ pain (Primary)    2. Nausea    3. Rectal bleeding    4. Bloating    I discussed with Aruna the benefits and risks of performing an EGD and colonoscopy.  Benefits and risks were not limited to but including bleeding, infection, perforation, complications of anesthesia, aspiration.  She appeared to understand and is willing to proceed.    Thank you for allowing me to participate in the care of this interesting patient.        "

## 2022-05-09 ENCOUNTER — TELEPHONE (OUTPATIENT)
Dept: GASTROENTEROLOGY | Facility: CLINIC | Age: 42
End: 2022-05-09

## 2022-05-09 NOTE — TELEPHONE ENCOUNTER
Pharmacy request to change linzess 30 day supply to 90 day supply for cost affective.    PT first visit: 11/11/21  HUMPHREY, CIC, 2 month follow up, labs and liver us  Pt cancelled 01/20/22 appt  12/20,12/9 Liver US No Show  11/30- Cancelled Liver US    Note: Pt Lap ghislaine 4-14-22/ CT of Abdominal Pelvis 4/27/22 and Planned EGD /C/S 5/24/22    Will mail out reminder letter to pt to follow up as recommended

## 2022-05-16 ENCOUNTER — HOSPITAL ENCOUNTER (OUTPATIENT)
Dept: GENERAL RADIOLOGY | Facility: HOSPITAL | Age: 42
Discharge: HOME | End: 2022-05-16
Admitting: NURSE PRACTITIONER

## 2022-05-16 DIAGNOSIS — R13.10 DYSPHAGIA, UNSPECIFIED TYPE: ICD-10-CM

## 2022-05-16 PROCEDURE — 92611 MOTION FLUOROSCOPY/SWALLOW: CPT

## 2022-05-16 PROCEDURE — 74230 X-RAY XM SWLNG FUNCJ C+: CPT

## 2022-05-16 NOTE — MBS/VFSS/FEES
Outpatient Speech Language Pathology   Adult Swallow Initial Evaluation   Modified Barium Swallow Study   Kimberley Barkley     Patient Name: Aruna Hammonds  : 1980  MRN: [id number]  Today's Date: 2022         Visit Date: 2022   Patient Active Problem List   Diagnosis   • Family history of colon cancer   • Chronic idiopathic constipation   • HUMPHREY (nonalcoholic steatohepatitis)   • LUQ pain   • Nausea   • Rectal bleeding   • Bloating        Past Medical History:   Diagnosis Date   • ADHD (attention deficit hyperactivity disorder)    • Bipolar 1 disorder (HCC)    • Depression    • Hypertension    • Hypothyroid    • Psychosis (HCC)    • PTSD (post-traumatic stress disorder)         Past Surgical History:   Procedure Laterality Date   • APPENDECTOMY     • CHOLECYSTECTOMY     • CHOLECYSTECTOMY WITH INTRAOPERATIVE CHOLANGIOGRAM N/A 2022    Procedure: CHOLECYSTECTOMY LAPAROSCOPIC INTRAOPERATIVE CHOLANGIOGRAM;  Surgeon: Mimi Sahu DO;  Location: [facility];  Service: General;  Laterality: N/A;   • COLONOSCOPY     • HYSTERECTOMY           Visit Dx:     ICD-10-CM ICD-9-CM   1. Dysphagia, unspecified type  R13.10 787.20            OP SLP Assessment/Plan - 22 1115        SLP Assessment    Functional Problems Swallowing  -AD    Impact on Function: Swallowing Risk of aspiration  -AD    Clinical Impression: Swallowing Mild:;oral phase dysphagia  -AD    Functional Problems Comment Pt reports choking on saliva and drinks/food on a daily basis.  -AD    Clinical Impression Comments Oral transit deficits with unclear etiology. Structure and function appear WFL of oral musculature. Concerns for anxiety r/t prior choking instances.  -AD    Please refer to paper survey for additional self-reported information No  -AD    Please refer to items scanned into chart for additional diagnostic informaiton and handouts as provided by clinician Yes   PACS images in EMR -AD    SLP Diagnosis Mild oral dysphagia  and functional pharyngeal phase of swallowing  -AD    Patient/caregiver participated in establishment of treatment plan and goals No  -AD    Patient would benefit from skilled therapy intervention No  -AD       SLP Plan    Frequency evaluation only  -AD    Plan Comments F/u if symptoms do not improve and negative EGD. May benefit from repeat MBSin 6-12 months.  -AD          User Key  (r) = Recorded By, (t) = Taken By, (c) = Cosigned By    Initials Name Provider Type    AD Debbie Segura MS CCC-SLP Speech and Language Pathologist                 SLP Adult Swallow Evaluation     Row Name 05/16/22 1111       Rehab Evaluation    Document Type evaluation  -AD    Subjective Information no complaints  -AD    Patient Observations alert;cooperative  -AD    Patient/Family/Caregiver Comments/Observations Pt seen seated at 90 degree angle in both lateral and AP views. Pt cooperative. C/o difficulty with swallowing her saliva and drinks/food at times.  -AD    Patient Effort good  -AD    Symptoms Noted During/After Treatment none  -AD       General Information    Patient Profile Reviewed yes  -AD    Pertinent History Of Current Problem Pt is a 40 y/o female referred for a modified barium swallow study by her PCP, Debbie Davies. Pt reports difficulty swallowing her saliva and at times liquids and foods. She reports choking on a daily basis. Recent cholecystectomy with intraoperative cholangiogram. Medical history significant for chronic constipation, Hypothyroidism w/autoimmune thyroiditis, HUMPHREY, general anxiety disorder and migraines. She report she is to have an EGD completed next week on 5/23/22.  -AD    Current Method of Nutrition soft textures;chopped;thin liquids;other (see comments)  chopped meats per pt due to edentulous status  -AD    Precautions/Limitations, Vision WFL  -AD    Precautions/Limitations, Hearing WFL  -AD    Prior Level of Function-Communication WFL  -AD    Prior Level of Function-Swallowing mechanical  soft textures;thin liquids;other (see comments)  due to edentulous status  -AD    Plans/Goals Discussed with patient;agreed upon  -AD    Barriers to Rehab none identified  -AD    Patient's Goals for Discharge eat/drink without coughing/choking  -AD       Pain    Additional Documentation --  no current pain was reported or indicated  -AD       Oral Motor Structure and Function    Oral Lesions or Structural Abnormalities and/or variants none  -AD    Dentition Assessment edentulous  -AD    Secretion Management WNL/WFL;other (see comments)  does report getting choked on saliva at times; reports difficulty swallowing it at times.  -AD    Mucosal Quality moist, healthy  -AD    Volitional Swallow delayed  difficulty with initiation  -AD    Volitional Cough WFL  -AD       Oral Musculature and Cranial Nerve Assessment    Oral Motor General Assessment WFL  -AD    Oral Motor, Comment No deficits of the lips, tongue, palate, jaw, cheeks or larynx noted. ROM and strength WFL  -AD       General Eating/Swallowing Observations    Respiratory Support Currently in Use room air  -AD    Eating/Swallowing Skills fed by SLP;other (see comments)  Pt controlled cup and straw drinks.  -AD    Positioning During Eating upright 90 degree;upright in chair;other (see comments)  left lateral and AP  -AD       Respiratory    Respiratory Status WFL;room air;during swallowing/eating  -AD       MBS/VFSS    Utensils Used spoon;cup;straw  -AD    Consistencies Trialed soft textures;pureed;thin liquids;nectar/syrup-thick liquids;honey-thick liquids  -AD       MBS/VFSS Interpretation    Oral Prep Phase WFL  -AD    Oral Transit Phase impaired  -AD    Oral Residue impaired  -AD    VFSS Summary Pt presents with a mild oral dysphagia and minimal to functional pharyngeal phase of swallowing. Deficits in oral transit are primary with delay in the initiation of bolus transit from 1-8 seconds, greatest on initial trials during the study on nectar thick from  spoon. Improvement in timing noted over the course of the evaluation. Bolus hold then swallow did not improve timing of oral transit. Mild piecemeal swallow noted on puree and solids and mild oral residue noted on cup trial of thins. Pt able to clear residue with spontaneous subsequent swallow. Premature spill of thins by spoon to the valleculae due to delay in oral transit, and thins by cup to the level of the pyriforms. Delay of swallow noted on consecutive straw drinks with bolus in the pyriforms at the height of the swallow. AP view revealed normal pharyngeal contraction and no deficits noted of the upper cervical esophagus. No significant pharyngeal residue noted after the swallow.  -AD    Oral Phase, Comment Pt presents with mild oral deficits with decreased oral prep of solids due to edentulous status. Pt with decreased initiation of oral transit with bolus hold and 'struggle' to initiate posterior bolus movement. Greatest on liquids and smaller bolus size. Occasional piecemeal transit noted on puree and solids with spontaneous clearance. Premature spill noted on thins from spoon and cup trials to the level of the valleculae and pyriforms respectively.  -AD       Oral Transit Phase    Impaired Oral Transit Phase delayed initiation of bolus transit;piecemeal oral transit;premature spillage of liquids into pharynx  -AD    Delayed Initiation of bolus transit thin liquids;nectar-thick liquids;honey-thick liquids;secondary to impaired cognitive status;other (see comments)  anxiety r/t prior difficulty swallowing; no weakenss or reduced lingual control noted  -AD    Piecemeal Oral Transit pudding/puree;mechanical soft;other (see comments)  edentulous status on soft solid; unsure of piecemeal transit on puree; pt spontaneously partitions bolus and stores in lateral sulcus; spontaneously recollects and swallows  -AD    Premature Spillage of Liquids into Pharynx thin liquids;other (see comments)  secondary to delay of  bolus transit  -AD    Oral Transit Phase, Comment Pt with delayed initiation of transit primarily on liquids including thins, nectar, honey and puree. Improved timing of transit with larger bolus. Delay noted of 1-3 seconds on thins (greatest on spoon size trials). Pt with premature spill noted on trials of thins from spoon, cup and straw to the level of the pyriforms on cup and straw. Attempted bolus hold then swallow with no significant improvement in oral transit. Pt also with piecemeal swallow noted on trials of puree and solid. Possibly r/t to cognition/anxiety disorder due to choking reported in past. No deficits of structure or motion noted to indicate reason for deficits.  -AD       Oral Residue    Impaired Oral Residue lingual residue;other (see comments)  back of tongue  -AD    Lingual Residue thin liquids;other (see comments)  thins by cup; back of tongue residue spontaneously clears.  -AD    Response to Oral Residue with spontaneous subsequent swallow  -AD    Oral Residue, Comment Mild oral residue noted on back of tongue on cup trial of thins. Not noted on any other trial of thins. Pt spontaneously clears. May be r/t bolus size and timing.  -AD       Initiation of Pharyngeal Swallow    Initiation of Pharyngeal Swallow bolus in valleculae;bolus in pyriform sinuses  thins only  -AD    Pharyngeal Phase functional pharyngeal phase of swallowing  -AD    Rosenbek's Scale thin:;nectar:;honey:;pudding/puree:;regular textures:;1--->level 1  -AD    Pharyngeal Phase, Comment Pt presents with an essentially functional pharyngeal phase of swallowing. Mild delay of swallow noted on trials of thins from spoon, cup and straw with 1-4 second delay. Thins by spoon reach w/premature spill x2 to valleculae, cup w/premature spil l to the pyriforms, and consecutive straw drinks with delay of 1-2 seconds to the level of the pyriforms. No penetration or aspiration was viewed. No significant pharyngeal residue noted after the  swallow on any trial or consistency. AP view on one trial of thins by straw with normal pharyngeal contraction.  -AD       Esophageal Phase    Esophageal Phase, Comment No deficits of the upper cervical esophagus identified.  -AD       SLP Evaluation Clinical Impression    SLP Swallowing Diagnosis mild;oral dysphagia;functional pharyngeal phase  -AD    Functional Impact no impact on function  -AD    Swallow Criteria for Skilled Therapeutic Interventions Met no problems identified which require skilled intervention  -AD       Recommendations    Therapy Frequency (Swallow) evaluation only  -AD    SLP Diet Recommendation soft textures;ground;thin liquids  -AD    Recommended Diagnostics reassess via VFSS (MBS);other (see comments)  if no change in status and no issues reported from upcoming EGD  -AD    Recommended Precautions and Strategies upright posture during/after eating;other (see comments)  large drinks from cup  -AD    Oral Care Recommendations Oral Care BID/PRN;Toothbrush  -AD    SLP Rec. for Method of Medication Administration meds whole;with thin liquids;with pudding or applesauce;as tolerated  -AD    Monitor for Signs of Aspiration no  -AD    Anticipated Discharge Disposition (SLP) home  -AD    Patient/Family Concerns, Anticipated Discharge Disposition (SLP) Pt did not report any concerns at this time.  -AD          User Key  (r) = Recorded By, (t) = Taken By, (c) = Cosigned By    Initials Name Provider Type    AD Debbie Segura MS CCC-SLP Speech and Language Pathologist                   OP SLP Education     Row Name 05/16/22 9155       Education    Education Provided Described results of evaluation;Patient expressed understanding of evaluation  -AD    Assessed Learning needs;Learning motivation;Learning preferences;Learning readiness  -AD    Learning Motivation Strong  -AD    Learning Method Explanation  -AD    Teaching Response Verbalized understanding  -AD    Education Comments Results of MBS  reviewed w/pt. She verbalizes understanding.  -CAREN          User Key  (r) = Recorded By, (t) = Taken By, (c) = Cosigned By    Initials Name Effective Dates    AD Debbie Segura, MS CCC-SLP 06/16/21 -                          Time Calculation:   SLP Start Time: 1115  SLP Stop Time: 1250  SLP Time Calculation (min): 95 min  SLP Non-Billable Time (min): 0 min  Total Timed Code Minutes- SLP: 0 minute(s)  Untimed Charges  SLP Eval/Re-eval : ST Motion Fluoro Eval Swallow - 55964  [id number] Motion Fluoro Eval Swallow Minutes: 95  Total Minutes  Untimed Charges Total Minutes: 95   Total Minutes: 95    Therapy Charges for Today     Code Description Service Date Service Provider Modifiers Qty    [id number] HC ST MOTION FLUORO EVAL SWALLOW 6 5/16/2022 Debbie Segura, MS CCC-SLP GN 1                   Debbie Segura MS CCC-SLP  5/16/2022

## 2022-05-23 ENCOUNTER — ANESTHESIA EVENT (OUTPATIENT)
Dept: PERIOP | Facility: HOSPITAL | Age: 42
End: 2022-05-23

## 2022-05-24 ENCOUNTER — ANESTHESIA (OUTPATIENT)
Dept: PERIOP | Facility: HOSPITAL | Age: 42
End: 2022-05-24

## 2022-05-24 ENCOUNTER — HOSPITAL ENCOUNTER (OUTPATIENT)
Facility: HOSPITAL | Age: 42
Setting detail: HOSPITAL OUTPATIENT SURGERY
Discharge: HOME | End: 2022-05-24
Attending: SURGERY | Admitting: SURGERY

## 2022-05-24 VITALS
SYSTOLIC BLOOD PRESSURE: 103 MMHG | BODY MASS INDEX: 29.92 KG/M2 | HEART RATE: 58 BPM | WEIGHT: 202 LBS | OXYGEN SATURATION: 98 % | HEIGHT: 69 IN | RESPIRATION RATE: 18 BRPM | DIASTOLIC BLOOD PRESSURE: 78 MMHG | TEMPERATURE: 97.9 F

## 2022-05-24 DIAGNOSIS — R14.0 BLOATING: ICD-10-CM

## 2022-05-24 DIAGNOSIS — R10.12 LUQ PAIN: ICD-10-CM

## 2022-05-24 DIAGNOSIS — R11.0 NAUSEA: ICD-10-CM

## 2022-05-24 DIAGNOSIS — K62.5 RECTAL BLEEDING: ICD-10-CM

## 2022-05-24 LAB
POTASSIUM SERPL-SCNC: 3.2 MMOL/L (ref 3.5–5.2)
UREASE TISS QL: POSITIVE

## 2022-05-24 PROCEDURE — 84132 ASSAY OF SERUM POTASSIUM: CPT | Performed by: ANESTHESIOLOGY

## 2022-05-24 PROCEDURE — 0 LIDOCAINE 1 % SOLUTION: Performed by: REGISTERED NURSE

## 2022-05-24 PROCEDURE — 87081 CULTURE SCREEN ONLY: CPT | Performed by: SURGERY

## 2022-05-24 PROCEDURE — 43239 EGD BIOPSY SINGLE/MULTIPLE: CPT | Performed by: SURGERY

## 2022-05-24 PROCEDURE — 88305 TISSUE EXAM BY PATHOLOGIST: CPT | Performed by: SURGERY

## 2022-05-24 PROCEDURE — 25010000002 PROPOFOL 10 MG/ML EMULSION: Performed by: REGISTERED NURSE

## 2022-05-24 PROCEDURE — 45380 COLONOSCOPY AND BIOPSY: CPT | Performed by: SURGERY

## 2022-05-24 RX ORDER — GLYCOPYRROLATE 0.2 MG/ML
INJECTION INTRAMUSCULAR; INTRAVENOUS AS NEEDED
Status: DISCONTINUED | OUTPATIENT
Start: 2022-05-24 | End: 2022-05-24 | Stop reason: SURG

## 2022-05-24 RX ORDER — SODIUM CHLORIDE 9 MG/ML
40 INJECTION, SOLUTION INTRAVENOUS AS NEEDED
Status: DISCONTINUED | OUTPATIENT
Start: 2022-05-24 | End: 2022-05-24 | Stop reason: HOSPADM

## 2022-05-24 RX ORDER — LIDOCAINE HYDROCHLORIDE 10 MG/ML
0.5 INJECTION, SOLUTION EPIDURAL; INFILTRATION; INTRACAUDAL; PERINEURAL ONCE AS NEEDED
Status: DISCONTINUED | OUTPATIENT
Start: 2022-05-24 | End: 2022-05-24 | Stop reason: HOSPADM

## 2022-05-24 RX ORDER — SUCRALFATE 1 G/1
1 TABLET ORAL 4 TIMES DAILY
Qty: 120 TABLET | Refills: 1 | Status: SHIPPED | OUTPATIENT
Start: 2022-05-24 | End: 2022-07-13

## 2022-05-24 RX ORDER — MAGNESIUM HYDROXIDE 1200 MG/15ML
LIQUID ORAL AS NEEDED
Status: DISCONTINUED | OUTPATIENT
Start: 2022-05-24 | End: 2022-05-24 | Stop reason: HOSPADM

## 2022-05-24 RX ORDER — LIDOCAINE HYDROCHLORIDE 10 MG/ML
INJECTION, SOLUTION INFILTRATION; PERINEURAL AS NEEDED
Status: DISCONTINUED | OUTPATIENT
Start: 2022-05-24 | End: 2022-05-24 | Stop reason: SURG

## 2022-05-24 RX ORDER — SODIUM CHLORIDE, SODIUM LACTATE, POTASSIUM CHLORIDE, CALCIUM CHLORIDE 600; 310; 30; 20 MG/100ML; MG/100ML; MG/100ML; MG/100ML
9 INJECTION, SOLUTION INTRAVENOUS CONTINUOUS PRN
Status: DISCONTINUED | OUTPATIENT
Start: 2022-05-24 | End: 2022-05-24 | Stop reason: HOSPADM

## 2022-05-24 RX ORDER — SODIUM CHLORIDE 0.9 % (FLUSH) 0.9 %
10 SYRINGE (ML) INJECTION AS NEEDED
Status: DISCONTINUED | OUTPATIENT
Start: 2022-05-24 | End: 2022-05-24 | Stop reason: HOSPADM

## 2022-05-24 RX ORDER — OMEPRAZOLE 20 MG/1
20 CAPSULE, DELAYED RELEASE ORAL DAILY
COMMUNITY

## 2022-05-24 RX ORDER — SODIUM CHLORIDE 0.9 % (FLUSH) 0.9 %
10 SYRINGE (ML) INJECTION EVERY 12 HOURS SCHEDULED
Status: DISCONTINUED | OUTPATIENT
Start: 2022-05-24 | End: 2022-05-24 | Stop reason: HOSPADM

## 2022-05-24 RX ORDER — PROPOFOL 10 MG/ML
VIAL (ML) INTRAVENOUS AS NEEDED
Status: DISCONTINUED | OUTPATIENT
Start: 2022-05-24 | End: 2022-05-24 | Stop reason: SURG

## 2022-05-24 RX ADMIN — LIDOCAINE HYDROCHLORIDE 50 MG: 10 INJECTION, SOLUTION INFILTRATION; PERINEURAL at 08:12

## 2022-05-24 RX ADMIN — SODIUM CHLORIDE, POTASSIUM CHLORIDE, SODIUM LACTATE AND CALCIUM CHLORIDE: 600; 310; 30; 20 INJECTION, SOLUTION INTRAVENOUS at 08:13

## 2022-05-24 RX ADMIN — PROPOFOL 100 MG: 10 INJECTION, EMULSION INTRAVENOUS at 08:12

## 2022-05-24 RX ADMIN — GLYCOPYRROLATE 0.2 MG: 0.2 INJECTION INTRAMUSCULAR; INTRAVENOUS at 08:13

## 2022-05-24 RX ADMIN — PROPOFOL 75 MCG/KG/MIN: 10 INJECTION, EMULSION INTRAVENOUS at 08:13

## 2022-05-24 NOTE — ANESTHESIA PREPROCEDURE EVALUATION
Anesthesia Evaluation     Patient summary reviewed and Nursing notes reviewed   no history of anesthetic complications:  NPO Solid Status: > 8 hours  NPO Liquid Status: > 8 hours           Airway   Mallampati: II  Dental - normal exam     Pulmonary - normal exam    breath sounds clear to auscultation  Sleep apnea: snores.  Cardiovascular - normal exam  Exercise tolerance: good (4-7 METS)    Rhythm: regular  Rate: normal    (+) hypertension well controlled less than 2 medications,       Neuro/Psych  (+) headaches (migraines), dizziness/light headedness (postural), psychiatric history PTSD, Bipolar and ADHD,    GI/Hepatic/Renal/Endo    (+)  GERD poorly controlled, GI bleeding lower , liver disease fatty liver disease, thyroid problem hypothyroidism    Musculoskeletal (-) negative ROS    Abdominal  - normal exam   Substance History - negative use     OB/GYN negative ob/gyn ROS         Other - negative ROS         Other Comment: psoriasis                  Anesthesia Plan    ASA 2     MAC     intravenous induction     Anesthetic plan, all risks, benefits, and alternatives have been provided, discussed and informed consent has been obtained with: patient.  Use of blood products discussed with patient  Consented to blood products.       CODE STATUS:

## 2022-05-24 NOTE — ANESTHESIA POSTPROCEDURE EVALUATION
Patient: Aruna Hammonds    Procedure Summary     Date: 05/24/22 Room / Location: [facility] ENDOSCOPY 1 /  [facility] OR    Anesthesia Start: 0810 Anesthesia Stop: 0838    Procedures:       Esophagogastroduodenoscopy with biopsy (N/A Esophagus)      Colonoscopy with polypectomy (N/A ) Diagnosis:       LUQ pain      Nausea      Rectal bleeding      Bloating      (LUQ pain [R10.12])      (Nausea [R11.0])      (Rectal bleeding [K62.5])      (Bloating [R14.0])    Surgeons: Mimi Sahu DO Provider: Moreno Mendez CRNA    Anesthesia Type: MAC ASA Status: 2          Anesthesia Type: MAC    Vitals  Vitals Value Taken Time   BP 97/59 05/24/22 0841   Temp 97.9 °F (36.6 °C) 05/24/22 0841   Pulse 52 05/24/22 0841   Resp 17 05/24/22 0841   SpO2 98 % 05/24/22 0841           Post Anesthesia Care and Evaluation    Patient location during evaluation: PHASE II  Patient participation: complete - patient participated  Level of consciousness: awake and alert  Pain score: 0  Pain management: satisfactory to patient  Airway patency: patent  Anesthetic complications: No anesthetic complications  PONV Status: none  Cardiovascular status: acceptable  Respiratory status: acceptable  Hydration status: acceptable

## 2022-05-25 LAB
LAB AP CASE REPORT: NORMAL
PATH REPORT.FINAL DX SPEC: NORMAL
PATH REPORT.GROSS SPEC: NORMAL

## 2022-07-13 ENCOUNTER — OFFICE VISIT (OUTPATIENT)
Dept: SURGERY | Facility: CLINIC | Age: 42
End: 2022-07-13

## 2022-07-13 VITALS — BODY MASS INDEX: 29.92 KG/M2 | WEIGHT: 202 LBS | HEIGHT: 69 IN

## 2022-07-13 DIAGNOSIS — A04.8 H. PYLORI INFECTION: Primary | ICD-10-CM

## 2022-07-13 PROCEDURE — 99213 OFFICE O/P EST LOW 20 MIN: CPT | Performed by: SURGERY

## 2022-07-13 NOTE — PROGRESS NOTES
Aruna Hammonds 41 y.o. female presents to discuss H Pylori Rx.  Pt states she was not able to natalya previous Rx due to metallic taste in her mouth.  Pt would like to have a new Rx.       HPI   Above-noted agree.  Aruna is unable to handle the H. pylori treatment.  She is also started smoking again.  She is still having bloating and epigastric tenderness and dyspepsia.  She denies chest pain or shortness of breath.  She has no fevers or chills.      Review of Systems        Past Medical History:   Diagnosis Date   • ADHD (attention deficit hyperactivity disorder)    • Bipolar 1 disorder (HCC)    • Depression    • Hypertension    • Hypothyroid    • Migraines    • Psychosis (HCC)    • PTSD (post-traumatic stress disorder)            Past Surgical History:   Procedure Laterality Date   • APPENDECTOMY     • CHOLECYSTECTOMY     • CHOLECYSTECTOMY WITH INTRAOPERATIVE CHOLANGIOGRAM N/A 04/14/2022    Procedure: CHOLECYSTECTOMY LAPAROSCOPIC INTRAOPERATIVE CHOLANGIOGRAM;  Surgeon: Mimi Sahu DO;  Location: [facility] OR;  Service: General;  Laterality: N/A;   • COLONOSCOPY     • COLONOSCOPY W/ POLYPECTOMY N/A 5/24/2022    Procedure: Colonoscopy with polypectomy;  Surgeon: Mimi Sahu DO;  Location: [facility] OR;  Service: Gastroenterology;  Laterality: N/A;  rectal polyp   • ENDOSCOPY N/A 5/24/2022    Procedure: Esophagogastroduodenoscopy with biopsy;  Surgeon: Mimi Sahu DO;  Location: [facility] OR;  Service: Gastroenterology;  Laterality: N/A;  gastritis  CAMRYN test   • HYSTERECTOMY             Physical Exam  Constitutional:       Appearance: Normal appearance.   Cardiovascular:      Rate and Rhythm: Normal rate and regular rhythm.   Pulmonary:      Effort: Pulmonary effort is normal.      Breath sounds: Normal breath sounds.   Abdominal:      General: Bowel sounds are normal.      Palpations: Abdomen is soft.   Musculoskeletal:         General: No swelling or tenderness.   Skin:     General: Skin is warm  "and dry.   Neurological:      General: No focal deficit present.      Mental Status: She is alert and oriented to person, place, and time.   Psychiatric:         Mood and Affect: Mood normal.         Behavior: Behavior normal.             Ht 175.3 cm (69\")   Wt 91.6 kg (202 lb)   BMI 29.83 kg/m²         Diagnoses and all orders for this visit:    1. H. pylori infection (Primary)    We discussed tobacco cessation.  We will call her in a different prescription for her H. pylori.  We will probably have to go through prior authorization to get it for her.    Thank you for allowing me to participate in the care of this interesting patient.    "

## 2022-11-09 ENCOUNTER — TELEPHONE (OUTPATIENT)
Dept: GASTROENTEROLOGY | Facility: CLINIC | Age: 42
End: 2022-11-09

## 2023-01-10 ENCOUNTER — TELEPHONE (OUTPATIENT)
Dept: SURGERY | Facility: CLINIC | Age: 43
End: 2023-01-10

## 2023-01-10 NOTE — TELEPHONE ENCOUNTER
Caller: Aruna Hammonds    Relationship: Self    Best call back number: [phone]    What is the best time to reach you: PT CAN BE REACHED ANYTIME; OKAY TO LEAVE MESSAGE IF NO ANSWER    HUB ATT TO WT    Who are you requesting to speak with (clinical staff, provider,  specific staff member): CLINICAL    What was the call regarding: PT STATED SHE IS EXPERIENCING EXTREME ABDOMINAL PAIN WITH NO RELIEF - PT WAS SEEN AT THE ER LAST WEEK - PT IS ASKING TO BE SEEN BY DR. CANDELARIA FOR THIS DX - PT IS EST W/ DR. CANDELARIA      Do you require a callback: YES

## 2023-01-11 ENCOUNTER — HOSPITAL ENCOUNTER (OUTPATIENT)
Facility: HOSPITAL | Age: 43
Discharge: HOME | End: 2023-01-13
Attending: OBSTETRICS & GYNECOLOGY | Admitting: OBSTETRICS & GYNECOLOGY
Payer: MEDICARE

## 2023-01-11 ENCOUNTER — PREP FOR SURGERY (OUTPATIENT)
Dept: OTHER | Facility: HOSPITAL | Age: 43
End: 2023-01-11
Payer: MEDICARE

## 2023-01-11 ENCOUNTER — HOSPITAL ENCOUNTER (OUTPATIENT)
Facility: HOSPITAL | Age: 43
Setting detail: HOSPITAL OUTPATIENT SURGERY
End: 2023-01-11
Attending: OBSTETRICS & GYNECOLOGY | Admitting: OBSTETRICS & GYNECOLOGY
Payer: MEDICARE

## 2023-01-11 ENCOUNTER — OFFICE VISIT (OUTPATIENT)
Dept: OBSTETRICS AND GYNECOLOGY | Facility: CLINIC | Age: 43
End: 2023-01-11
Payer: MEDICARE

## 2023-01-11 ENCOUNTER — TELEPHONE (OUTPATIENT)
Dept: OBSTETRICS AND GYNECOLOGY | Facility: CLINIC | Age: 43
End: 2023-01-11
Payer: MEDICARE

## 2023-01-11 VITALS
BODY MASS INDEX: 27.4 KG/M2 | WEIGHT: 185 LBS | SYSTOLIC BLOOD PRESSURE: 124 MMHG | DIASTOLIC BLOOD PRESSURE: 82 MMHG | HEIGHT: 69 IN

## 2023-01-11 DIAGNOSIS — R10.2 PELVIC PAIN: Primary | ICD-10-CM

## 2023-01-11 DIAGNOSIS — N83.201 BILATERAL OVARIAN CYSTS: ICD-10-CM

## 2023-01-11 DIAGNOSIS — Z90.710 H/O ABDOMINAL HYSTERECTOMY: ICD-10-CM

## 2023-01-11 DIAGNOSIS — R10.2 PELVIC PAIN: ICD-10-CM

## 2023-01-11 DIAGNOSIS — N83.202 BILATERAL OVARIAN CYSTS: ICD-10-CM

## 2023-01-11 DIAGNOSIS — Z90.721 S/P LEFT OOPHORECTOMY: Primary | ICD-10-CM

## 2023-01-11 PROBLEM — N83.209 OVARIAN CYST: Status: ACTIVE | Noted: 2023-01-11

## 2023-01-11 LAB
ABO GROUP BLD: NORMAL
ABO GROUP BLD: NORMAL
BILIRUB BLD-MCNC: NEGATIVE MG/DL
BLD GP AB SCN SERPL QL: NEGATIVE
CLARITY, POC: CLEAR
COLOR UR: YELLOW
DEPRECATED RDW RBC AUTO: 40.7 FL (ref 37–54)
ERYTHROCYTE [DISTWIDTH] IN BLOOD BY AUTOMATED COUNT: 13.1 % (ref 12.3–15.4)
GLUCOSE UR STRIP-MCNC: NEGATIVE MG/DL
HCT VFR BLD AUTO: 43.2 % (ref 34–46.6)
HGB BLD-MCNC: 14.2 G/DL (ref 12–15.9)
KETONES UR QL: NEGATIVE
LEUKOCYTE EST, POC: NEGATIVE
MCH RBC QN AUTO: 28.7 PG (ref 26.6–33)
MCHC RBC AUTO-ENTMCNC: 32.9 G/DL (ref 31.5–35.7)
MCV RBC AUTO: 87.4 FL (ref 79–97)
NITRITE UR-MCNC: NEGATIVE MG/ML
PH UR: 5 [PH] (ref 5–8)
PLATELET # BLD AUTO: 264 10*3/MM3 (ref 140–450)
PMV BLD AUTO: 10.7 FL (ref 6–12)
PROT UR STRIP-MCNC: NEGATIVE MG/DL
RBC # BLD AUTO: 4.94 10*6/MM3 (ref 3.77–5.28)
RBC # UR STRIP: NEGATIVE /UL
RH BLD: POSITIVE
RH BLD: POSITIVE
SP GR UR: 1 (ref 1–1.03)
T&S EXPIRATION DATE: NORMAL
UROBILINOGEN UR QL: NORMAL
WBC NRBC COR # BLD: 6.72 10*3/MM3 (ref 3.4–10.8)

## 2023-01-11 PROCEDURE — 86901 BLOOD TYPING SEROLOGIC RH(D): CPT

## 2023-01-11 PROCEDURE — G0378 HOSPITAL OBSERVATION PER HR: HCPCS

## 2023-01-11 PROCEDURE — 81002 URINALYSIS NONAUTO W/O SCOPE: CPT | Performed by: NURSE PRACTITIONER

## 2023-01-11 PROCEDURE — 86900 BLOOD TYPING SEROLOGIC ABO: CPT | Performed by: NURSE PRACTITIONER

## 2023-01-11 PROCEDURE — 85027 COMPLETE CBC AUTOMATED: CPT | Performed by: NURSE PRACTITIONER

## 2023-01-11 PROCEDURE — 99204 OFFICE O/P NEW MOD 45 MIN: CPT | Performed by: NURSE PRACTITIONER

## 2023-01-11 PROCEDURE — G0379 DIRECT REFER HOSPITAL OBSERV: HCPCS

## 2023-01-11 PROCEDURE — 86850 RBC ANTIBODY SCREEN: CPT | Performed by: NURSE PRACTITIONER

## 2023-01-11 PROCEDURE — 86901 BLOOD TYPING SEROLOGIC RH(D): CPT | Performed by: NURSE PRACTITIONER

## 2023-01-11 PROCEDURE — 86900 BLOOD TYPING SEROLOGIC ABO: CPT

## 2023-01-11 PROCEDURE — 96374 THER/PROPH/DIAG INJ IV PUSH: CPT

## 2023-01-11 PROCEDURE — 96361 HYDRATE IV INFUSION ADD-ON: CPT

## 2023-01-11 RX ORDER — ONDANSETRON 4 MG/1
TABLET, ORALLY DISINTEGRATING ORAL
COMMUNITY
Start: 2022-11-19 | End: 2023-02-13

## 2023-01-11 RX ORDER — ADALIMUMAB 40MG/0.8ML
KIT SUBCUTANEOUS
COMMUNITY
Start: 2022-11-21

## 2023-01-11 RX ORDER — ALPRAZOLAM 0.25 MG/1
TABLET ORAL
COMMUNITY
Start: 2022-12-13

## 2023-01-11 RX ORDER — AMLODIPINE BESYLATE 5 MG/1
2.5 TABLET ORAL
Status: DISCONTINUED | OUTPATIENT
Start: 2023-01-12 | End: 2023-01-13 | Stop reason: HOSPADM

## 2023-01-11 RX ORDER — POTASSIUM CHLORIDE 750 MG/1
CAPSULE, EXTENDED RELEASE ORAL
COMMUNITY
Start: 2022-11-15

## 2023-01-11 RX ORDER — SODIUM CHLORIDE, SODIUM LACTATE, POTASSIUM CHLORIDE, CALCIUM CHLORIDE 600; 310; 30; 20 MG/100ML; MG/100ML; MG/100ML; MG/100ML
125 INJECTION, SOLUTION INTRAVENOUS CONTINUOUS
Status: DISCONTINUED | OUTPATIENT
Start: 2023-01-11 | End: 2023-01-12

## 2023-01-11 RX ORDER — SODIUM CHLORIDE 0.9 % (FLUSH) 0.9 %
10 SYRINGE (ML) INJECTION AS NEEDED
Status: DISCONTINUED | OUTPATIENT
Start: 2023-01-11 | End: 2023-01-13 | Stop reason: HOSPADM

## 2023-01-11 RX ORDER — TOPIRAMATE 100 MG/1
100 TABLET, FILM COATED ORAL EVERY 12 HOURS SCHEDULED
Status: DISCONTINUED | OUTPATIENT
Start: 2023-01-11 | End: 2023-01-12 | Stop reason: SDUPTHER

## 2023-01-11 RX ORDER — ALPRAZOLAM 0.25 MG/1
0.25 TABLET ORAL 2 TIMES DAILY PRN
Status: DISCONTINUED | OUTPATIENT
Start: 2023-01-11 | End: 2023-01-13 | Stop reason: HOSPADM

## 2023-01-11 RX ORDER — OXYCODONE HYDROCHLORIDE 5 MG/1
5 TABLET ORAL EVERY 4 HOURS PRN
Status: DISCONTINUED | OUTPATIENT
Start: 2023-01-11 | End: 2023-01-13 | Stop reason: HOSPADM

## 2023-01-11 RX ORDER — LEVOTHYROXINE SODIUM 0.05 MG/1
TABLET ORAL
COMMUNITY
Start: 2022-11-15

## 2023-01-11 RX ORDER — FAMOTIDINE 10 MG/ML
20 INJECTION, SOLUTION INTRAVENOUS 2 TIMES DAILY
Status: DISCONTINUED | OUTPATIENT
Start: 2023-01-11 | End: 2023-01-13 | Stop reason: HOSPADM

## 2023-01-11 RX ORDER — POTASSIUM CHLORIDE 20 MEQ/1
10 TABLET, EXTENDED RELEASE ORAL DAILY
Status: DISCONTINUED | OUTPATIENT
Start: 2023-01-12 | End: 2023-01-13 | Stop reason: HOSPADM

## 2023-01-11 RX ORDER — IBUPROFEN 600 MG/1
600 TABLET ORAL EVERY 6 HOURS SCHEDULED
Status: DISCONTINUED | OUTPATIENT
Start: 2023-01-11 | End: 2023-01-13 | Stop reason: HOSPADM

## 2023-01-11 RX ORDER — ACETAMINOPHEN 500 MG
1000 TABLET ORAL ONCE
Status: CANCELLED | OUTPATIENT
Start: 2023-01-11 | End: 2023-01-11

## 2023-01-11 RX ORDER — DOCUSATE SODIUM 100 MG/1
100 CAPSULE, LIQUID FILLED ORAL 2 TIMES DAILY
Status: DISCONTINUED | OUTPATIENT
Start: 2023-01-11 | End: 2023-01-12 | Stop reason: SDUPTHER

## 2023-01-11 RX ORDER — CEFAZOLIN SODIUM 2 G/50ML
2 SOLUTION INTRAVENOUS ONCE
Status: CANCELLED | OUTPATIENT
Start: 2023-01-11 | End: 2023-01-11

## 2023-01-11 RX ORDER — SODIUM CHLORIDE 9 MG/ML
40 INJECTION, SOLUTION INTRAVENOUS AS NEEDED
Status: CANCELLED | OUTPATIENT
Start: 2023-01-11

## 2023-01-11 RX ORDER — SODIUM CHLORIDE 0.9 % (FLUSH) 0.9 %
10 SYRINGE (ML) INJECTION EVERY 12 HOURS SCHEDULED
Status: DISCONTINUED | OUTPATIENT
Start: 2023-01-11 | End: 2023-01-11

## 2023-01-11 RX ORDER — SODIUM CHLORIDE 0.9 % (FLUSH) 0.9 %
3 SYRINGE (ML) INJECTION EVERY 12 HOURS SCHEDULED
Status: CANCELLED | OUTPATIENT
Start: 2023-01-11

## 2023-01-11 RX ORDER — SODIUM CHLORIDE 0.9 % (FLUSH) 0.9 %
1-10 SYRINGE (ML) INJECTION AS NEEDED
Status: CANCELLED | OUTPATIENT
Start: 2023-01-11

## 2023-01-11 RX ORDER — ACETAMINOPHEN 325 MG/1
650 TABLET ORAL EVERY 6 HOURS PRN
Status: DISCONTINUED | OUTPATIENT
Start: 2023-01-11 | End: 2023-01-13 | Stop reason: HOSPADM

## 2023-01-11 RX ADMIN — FAMOTIDINE 20 MG: 10 INJECTION INTRAVENOUS at 21:26

## 2023-01-11 RX ADMIN — IBUPROFEN 600 MG: 600 TABLET ORAL at 23:55

## 2023-01-11 RX ADMIN — OXYCODONE HYDROCHLORIDE 5 MG: 5 TABLET ORAL at 17:31

## 2023-01-11 RX ADMIN — OXYCODONE HYDROCHLORIDE 5 MG: 5 TABLET ORAL at 21:22

## 2023-01-11 RX ADMIN — IBUPROFEN 600 MG: 600 TABLET ORAL at 17:31

## 2023-01-11 RX ADMIN — ACETAMINOPHEN 650 MG: 325 TABLET ORAL at 17:31

## 2023-01-11 RX ADMIN — DOCUSATE SODIUM 100 MG: 100 CAPSULE ORAL at 21:22

## 2023-01-11 RX ADMIN — SODIUM CHLORIDE, POTASSIUM CHLORIDE, SODIUM LACTATE AND CALCIUM CHLORIDE 125 ML/HR: 600; 310; 30; 20 INJECTION, SOLUTION INTRAVENOUS at 17:33

## 2023-01-11 RX ADMIN — TOPIRAMATE 100 MG: 25 TABLET, FILM COATED ORAL at 21:22

## 2023-01-11 NOTE — PROGRESS NOTES
"Subjective     Chief Complaint   Patient presents with   • Follow-up     ER F/U, Cyst   Causing abd pain non stop,   Feeling nausea,        Aruna Hammonds is a 42 y.o. No obstetric history on file. whose LMP is No LMP recorded. Patient has had a hysterectomy.. She presents for follow up from [facility] ER. She reports constant pain in her abdomen, mainly on the (R) side. If she takes a phenergan and lay down, she will ease the pain. The pain is described as \"you have a real bad stomach virus.\" She reports normal bowel habits. She has nausea, no vomiting. She has decreased appetite.     She is noted to be very uncomfortable while sitting on the exam table. She is tearful when discussing. States, \"no one will help me.\"     She reports the ER told her she had a cold.     She has a history of endometriosis.     HPI    HPI    The following portions of the patient's history were reviewed and updated as appropriate:vital signs, allergies, current medications, past medical history, past social history, past surgical history and problem list      Review of Systems     Review of Systems   Constitutional: Negative for chills and fever.   Gastrointestinal: Positive for abdominal distention, abdominal pain and nausea. Negative for vomiting.   Genitourinary: Positive for pelvic pain.       Objective      /82   Ht 175.3 cm (69.02\")   Wt 83.9 kg (185 lb)   BMI 27.31 kg/m²     Physical Exam    Physical Exam  Vitals and nursing note reviewed.   Constitutional:       Appearance: Normal appearance.   Abdominal:      General: Bowel sounds are normal.      Palpations: There is no mass.      Tenderness: There is generalized abdominal tenderness and tenderness in the right lower quadrant, periumbilical area, suprapubic area and left lower quadrant.   Musculoskeletal:         General: Normal range of motion.   Skin:     General: Skin is warm and dry.   Neurological:      General: No focal deficit present.      Mental Status: " She is alert and oriented to person, place, and time.   Psychiatric:         Mood and Affect: Mood normal.         Behavior: Behavior normal.         Lab Review   Labs: Urinalysis - with micro     Imaging   Ultrasound - Pelvic Vaginal from ER visit. Uterus surgically absent. (R) ovary wnl. (L) ovary with 2 cyst. One measuring 2.4 cm and the other measuring 3.7cm. (22)     Assessment  Diagnoses and all orders for this visit:    1. Unspecified  screening (Primary)  -     POC Urinalysis Dipstick        Additional Assessment:   1. Ovarian cyst     Plan     1.  Ovarian cyst- [facility] for further evaluation and management. Dr. Pro and Dr. Phillip aware. Pt taken to  via wheelchair.       Brittani Lott, APRN  2023

## 2023-01-11 NOTE — PLAN OF CARE
Problem: Adult Inpatient Plan of Care  Goal: Plan of Care Review  1/11/2023 1853 by Brunilda Jama RN  Outcome: Ongoing, Progressing  Flowsheets (Taken 1/11/2023 1853)  Outcome Evaluation: VSS, pain manageable with PO medications. NPO at midnight, scheduled salphinoophrectomy tomorrow  1/11/2023 1853 by Brunilda Jama RN  Outcome: Ongoing, Progressing  Flowsheets (Taken 1/11/2023 1853)  Outcome Evaluation: VSS, pain manageable with PO medications. NPO at midnight, scheduled salphinoophrectomy tomorrow  Goal: Patient-Specific Goal (Individualized)  1/11/2023 1853 by Brunilda Jama RN  Outcome: Ongoing, Progressing  Flowsheets (Taken 1/11/2023 1853)  Patient-Specific Goals (Include Timeframe): patient wants to have decreased pain overnight  1/11/2023 1853 by Brunilda Jama RN  Outcome: Ongoing, Progressing  Flowsheets (Taken 1/11/2023 1853)  Patient-Specific Goals (Include Timeframe): patient wants to have decreased pain overnight  Goal: Absence of Hospital-Acquired Illness or Injury  1/11/2023 1853 by Brunilda Jama RN  Outcome: Ongoing, Progressing  1/11/2023 1853 by Brunilda Jama RN  Outcome: Ongoing, Progressing  Intervention: Identify and Manage Fall Risk  Recent Flowsheet Documentation  Taken 1/11/2023 1800 by Brunilda Jama RN  Safety Promotion/Fall Prevention: safety round/check completed  Taken 1/11/2023 1709 by Brunilda Jama RN  Safety Promotion/Fall Prevention: safety round/check completed  Intervention: Prevent Skin Injury  Recent Flowsheet Documentation  Taken 1/11/2023 1709 by Brunilda Jama RN  Body Position: supine  Intervention: Prevent and Manage VTE (Venous Thromboembolism) Risk  Recent Flowsheet Documentation  Taken 1/11/2023 1709 by Brunilda Jama RN  Activity Management:   activity adjusted per tolerance   up ad lay  Goal: Optimal Comfort and Wellbeing  1/11/2023 1853 by Brunilda Jama RN  Outcome: Ongoing, Progressing  1/11/2023 1853 by Brunilda Jama  RN  Outcome: Ongoing, Progressing  Intervention: Monitor Pain and Promote Comfort  Recent Flowsheet Documentation  Taken 1/11/2023 1731 by Brunilda Jama RN  Pain Management Interventions: see MAR  Taken 1/11/2023 1709 by Brunilda Jama RN  Pain Management Interventions: (waiting for medications to be verified by pharmacy) --  Intervention: Provide Person-Centered Care  Recent Flowsheet Documentation  Taken 1/11/2023 1709 by Brunilda Jama RN  Trust Relationship/Rapport:   care explained   choices provided   emotional support provided   questions encouraged   thoughts/feelings acknowledged  Goal: Readiness for Transition of Care  1/11/2023 1853 by Bruinlda Jama RN  Outcome: Ongoing, Progressing  1/11/2023 1853 by Brunilda Jama RN  Outcome: Ongoing, Progressing     Problem: Pain Acute  Goal: Acceptable Pain Control and Functional Ability  1/11/2023 1853 by Brunilda Jama RN  Outcome: Ongoing, Progressing  1/11/2023 1853 by Brunilda Jama RN  Outcome: Ongoing, Progressing  Intervention: Develop Pain Management Plan  Recent Flowsheet Documentation  Taken 1/11/2023 1731 by Brunilda Jama RN  Pain Management Interventions: see MAR  Taken 1/11/2023 1709 by Brunilda Jama RN  Pain Management Interventions: (waiting for medications to be verified by pharmacy) --  Intervention: Optimize Psychosocial Wellbeing  Recent Flowsheet Documentation  Taken 1/11/2023 1709 by Brunilda Jama RN  Diversional Activities: smartphone   Goal Outcome Evaluation:

## 2023-01-12 ENCOUNTER — ANESTHESIA EVENT (OUTPATIENT)
Dept: PERIOP | Facility: HOSPITAL | Age: 43
End: 2023-01-12
Payer: MEDICARE

## 2023-01-12 ENCOUNTER — ANESTHESIA (OUTPATIENT)
Dept: PERIOP | Facility: HOSPITAL | Age: 43
End: 2023-01-12
Payer: MEDICARE

## 2023-01-12 PROBLEM — Z76.89 ENCOUNTER FOR CHOLECYSTECTOMY: Status: ACTIVE | Noted: 2023-01-12

## 2023-01-12 PROBLEM — Z90.49 HISTORY OF APPENDECTOMY: Status: ACTIVE | Noted: 2023-01-12

## 2023-01-12 PROBLEM — R10.12 LUQ PAIN: Status: RESOLVED | Noted: 2022-05-02 | Resolved: 2023-01-12

## 2023-01-12 PROBLEM — Z87.42 HISTORY OF ENDOMETRIOSIS: Status: ACTIVE | Noted: 2023-01-12

## 2023-01-12 PROBLEM — R11.0 NAUSEA: Status: RESOLVED | Noted: 2022-05-02 | Resolved: 2023-01-12

## 2023-01-12 PROBLEM — N83.201 BILATERAL OVARIAN CYSTS: Status: RESOLVED | Noted: 2023-01-11 | Resolved: 2023-01-12

## 2023-01-12 PROBLEM — R14.0 BLOATING: Status: RESOLVED | Noted: 2022-05-02 | Resolved: 2023-01-12

## 2023-01-12 PROBLEM — N83.202 BILATERAL OVARIAN CYSTS: Status: RESOLVED | Noted: 2023-01-11 | Resolved: 2023-01-12

## 2023-01-12 LAB
ALBUMIN SERPL-MCNC: 3.8 G/DL (ref 3.5–5.2)
ALBUMIN/GLOB SERPL: 1.2 G/DL
ALP SERPL-CCNC: 120 U/L (ref 39–117)
ALT SERPL W P-5'-P-CCNC: 36 U/L (ref 1–33)
ANION GAP SERPL CALCULATED.3IONS-SCNC: 14.1 MMOL/L (ref 5–15)
AST SERPL-CCNC: 48 U/L (ref 1–32)
BILIRUB SERPL-MCNC: 0.7 MG/DL (ref 0–1.2)
BUN SERPL-MCNC: 2 MG/DL (ref 6–20)
BUN/CREAT SERPL: 2.1 (ref 7–25)
CALCIUM SPEC-SCNC: 8.9 MG/DL (ref 8.6–10.5)
CHLORIDE SERPL-SCNC: 105 MMOL/L (ref 98–107)
CO2 SERPL-SCNC: 17.9 MMOL/L (ref 22–29)
CREAT SERPL-MCNC: 0.96 MG/DL (ref 0.57–1)
DEPRECATED RDW RBC AUTO: 41.1 FL (ref 37–54)
EGFRCR SERPLBLD CKD-EPI 2021: 75.9 ML/MIN/1.73
ERYTHROCYTE [DISTWIDTH] IN BLOOD BY AUTOMATED COUNT: 12.8 % (ref 12.3–15.4)
GLOBULIN UR ELPH-MCNC: 3.1 GM/DL
GLUCOSE SERPL-MCNC: 104 MG/DL (ref 65–99)
HCT VFR BLD AUTO: 41.9 % (ref 34–46.6)
HGB BLD-MCNC: 13.6 G/DL (ref 12–15.9)
MCH RBC QN AUTO: 28.3 PG (ref 26.6–33)
MCHC RBC AUTO-ENTMCNC: 32.5 G/DL (ref 31.5–35.7)
MCV RBC AUTO: 87.1 FL (ref 79–97)
PLATELET # BLD AUTO: 308 10*3/MM3 (ref 140–450)
PMV BLD AUTO: 10.5 FL (ref 6–12)
POTASSIUM SERPL-SCNC: 3.4 MMOL/L (ref 3.5–5.2)
PROT SERPL-MCNC: 6.9 G/DL (ref 6–8.5)
RBC # BLD AUTO: 4.81 10*6/MM3 (ref 3.77–5.28)
SODIUM SERPL-SCNC: 137 MMOL/L (ref 136–145)
WBC NRBC COR # BLD: 9.21 10*3/MM3 (ref 3.4–10.8)

## 2023-01-12 PROCEDURE — G0378 HOSPITAL OBSERVATION PER HR: HCPCS

## 2023-01-12 PROCEDURE — 25010000002 PROPOFOL 10 MG/ML EMULSION: Performed by: NURSE ANESTHETIST, CERTIFIED REGISTERED

## 2023-01-12 PROCEDURE — 80053 COMPREHEN METABOLIC PANEL: CPT | Performed by: OBSTETRICS & GYNECOLOGY

## 2023-01-12 PROCEDURE — 25010000002 MIDAZOLAM PER 1MG: Performed by: NURSE ANESTHETIST, CERTIFIED REGISTERED

## 2023-01-12 PROCEDURE — 58661 LAPAROSCOPY REMOVE ADNEXA: CPT | Performed by: OBSTETRICS & GYNECOLOGY

## 2023-01-12 PROCEDURE — 25010000002 NEOSTIGMINE 10 MG/10ML SOLUTION: Performed by: NURSE ANESTHETIST, CERTIFIED REGISTERED

## 2023-01-12 PROCEDURE — 25010000002 ONDANSETRON PER 1 MG: Performed by: OBSTETRICS & GYNECOLOGY

## 2023-01-12 PROCEDURE — A9270 NON-COVERED ITEM OR SERVICE: HCPCS | Performed by: NURSE ANESTHETIST, CERTIFIED REGISTERED

## 2023-01-12 PROCEDURE — 0 CEFAZOLIN SODIUM-DEXTROSE 2-3 GM-%(50ML) RECONSTITUTED SOLUTION: Performed by: OBSTETRICS & GYNECOLOGY

## 2023-01-12 PROCEDURE — A9270 NON-COVERED ITEM OR SERVICE: HCPCS | Performed by: OBSTETRICS & GYNECOLOGY

## 2023-01-12 PROCEDURE — 63710000001 SIMETHICONE 80 MG CHEWABLE TABLET: Performed by: OBSTETRICS & GYNECOLOGY

## 2023-01-12 PROCEDURE — 63710000001 IBUPROFEN 600 MG TABLET: Performed by: OBSTETRICS & GYNECOLOGY

## 2023-01-12 PROCEDURE — 63710000001 OXYCODONE-ACETAMINOPHEN 5-325 MG TABLET: Performed by: NURSE ANESTHETIST, CERTIFIED REGISTERED

## 2023-01-12 PROCEDURE — 96361 HYDRATE IV INFUSION ADD-ON: CPT

## 2023-01-12 PROCEDURE — 63710000001 TOPIRAMATE PER 25 MG: Performed by: OBSTETRICS & GYNECOLOGY

## 2023-01-12 PROCEDURE — S0260 H&P FOR SURGERY: HCPCS | Performed by: OBSTETRICS & GYNECOLOGY

## 2023-01-12 PROCEDURE — 85027 COMPLETE CBC AUTOMATED: CPT | Performed by: OBSTETRICS & GYNECOLOGY

## 2023-01-12 PROCEDURE — 88307 TISSUE EXAM BY PATHOLOGIST: CPT | Performed by: OBSTETRICS & GYNECOLOGY

## 2023-01-12 PROCEDURE — 63710000001 DOCUSATE SODIUM 100 MG CAPSULE: Performed by: OBSTETRICS & GYNECOLOGY

## 2023-01-12 PROCEDURE — 25010000002 DEXAMETHASONE PER 1 MG: Performed by: NURSE ANESTHETIST, CERTIFIED REGISTERED

## 2023-01-12 PROCEDURE — 25010000002 ONDANSETRON PER 1 MG: Performed by: NURSE ANESTHETIST, CERTIFIED REGISTERED

## 2023-01-12 PROCEDURE — 96375 TX/PRO/DX INJ NEW DRUG ADDON: CPT

## 2023-01-12 PROCEDURE — 63710000001 OXYCODONE-ACETAMINOPHEN 5-325 MG TABLET: Performed by: OBSTETRICS & GYNECOLOGY

## 2023-01-12 PROCEDURE — 63710000001 CLOTRIMAZOLE-BETAMETHASONE 1-0.05 % CREAM 15 G TUBE: Performed by: OBSTETRICS & GYNECOLOGY

## 2023-01-12 PROCEDURE — 58661 LAPAROSCOPY REMOVE ADNEXA: CPT | Performed by: SPECIALIST/TECHNOLOGIST, OTHER

## 2023-01-12 PROCEDURE — 25010000002 HYDROMORPHONE 1 MG/ML SOLUTION: Performed by: NURSE ANESTHETIST, CERTIFIED REGISTERED

## 2023-01-12 PROCEDURE — 63710000001 ACETAMINOPHEN 500 MG TABLET: Performed by: OBSTETRICS & GYNECOLOGY

## 2023-01-12 RX ORDER — SODIUM CHLORIDE 0.9 % (FLUSH) 0.9 %
1-10 SYRINGE (ML) INJECTION AS NEEDED
Status: DISCONTINUED | OUTPATIENT
Start: 2023-01-12 | End: 2023-01-12 | Stop reason: HOSPADM

## 2023-01-12 RX ORDER — AMLODIPINE BESYLATE 5 MG/1
2.5 TABLET ORAL DAILY
Status: DISCONTINUED | OUTPATIENT
Start: 2023-01-12 | End: 2023-01-13

## 2023-01-12 RX ORDER — ONDANSETRON 2 MG/ML
4 INJECTION INTRAMUSCULAR; INTRAVENOUS ONCE AS NEEDED
Status: COMPLETED | OUTPATIENT
Start: 2023-01-12 | End: 2023-01-12

## 2023-01-12 RX ORDER — DEXAMETHASONE SODIUM PHOSPHATE 4 MG/ML
8 INJECTION, SOLUTION INTRA-ARTICULAR; INTRALESIONAL; INTRAMUSCULAR; INTRAVENOUS; SOFT TISSUE ONCE
Status: COMPLETED | OUTPATIENT
Start: 2023-01-12 | End: 2023-01-12

## 2023-01-12 RX ORDER — SODIUM CHLORIDE, SODIUM LACTATE, POTASSIUM CHLORIDE, CALCIUM CHLORIDE 600; 310; 30; 20 MG/100ML; MG/100ML; MG/100ML; MG/100ML
9 INJECTION, SOLUTION INTRAVENOUS CONTINUOUS
Status: DISCONTINUED | OUTPATIENT
Start: 2023-01-12 | End: 2023-01-12

## 2023-01-12 RX ORDER — ONDANSETRON 2 MG/ML
4 INJECTION INTRAMUSCULAR; INTRAVENOUS ONCE AS NEEDED
Status: DISCONTINUED | OUTPATIENT
Start: 2023-01-12 | End: 2023-01-12 | Stop reason: HOSPADM

## 2023-01-12 RX ORDER — DEXMEDETOMIDINE HYDROCHLORIDE 100 UG/ML
INJECTION, SOLUTION INTRAVENOUS AS NEEDED
Status: DISCONTINUED | OUTPATIENT
Start: 2023-01-12 | End: 2023-01-12 | Stop reason: SURG

## 2023-01-12 RX ORDER — ROCURONIUM BROMIDE 10 MG/ML
INJECTION, SOLUTION INTRAVENOUS AS NEEDED
Status: DISCONTINUED | OUTPATIENT
Start: 2023-01-12 | End: 2023-01-12 | Stop reason: SURG

## 2023-01-12 RX ORDER — GLYCOPYRROLATE 0.2 MG/ML
INJECTION INTRAMUSCULAR; INTRAVENOUS AS NEEDED
Status: DISCONTINUED | OUTPATIENT
Start: 2023-01-12 | End: 2023-01-12 | Stop reason: SURG

## 2023-01-12 RX ORDER — CEFAZOLIN SODIUM 2 G/50ML
2 SOLUTION INTRAVENOUS ONCE
Status: COMPLETED | OUTPATIENT
Start: 2023-01-12 | End: 2023-01-12

## 2023-01-12 RX ORDER — FAMOTIDINE 10 MG/ML
20 INJECTION, SOLUTION INTRAVENOUS
Status: COMPLETED | OUTPATIENT
Start: 2023-01-12 | End: 2023-01-12

## 2023-01-12 RX ORDER — POLYETHYLENE GLYCOL 3350 17 G/17G
17 POWDER, FOR SOLUTION ORAL DAILY PRN
Status: DISCONTINUED | OUTPATIENT
Start: 2023-01-12 | End: 2023-01-13 | Stop reason: HOSPADM

## 2023-01-12 RX ORDER — FENTANYL CITRATE 50 UG/ML
50 INJECTION, SOLUTION INTRAMUSCULAR; INTRAVENOUS
Status: DISCONTINUED | OUTPATIENT
Start: 2023-01-12 | End: 2023-01-12 | Stop reason: HOSPADM

## 2023-01-12 RX ORDER — PROPOFOL 10 MG/ML
VIAL (ML) INTRAVENOUS AS NEEDED
Status: DISCONTINUED | OUTPATIENT
Start: 2023-01-12 | End: 2023-01-12 | Stop reason: SURG

## 2023-01-12 RX ORDER — MIDAZOLAM HYDROCHLORIDE 2 MG/2ML
1 INJECTION, SOLUTION INTRAMUSCULAR; INTRAVENOUS
Status: DISCONTINUED | OUTPATIENT
Start: 2023-01-12 | End: 2023-01-12 | Stop reason: HOSPADM

## 2023-01-12 RX ORDER — SODIUM CHLORIDE 0.9 % (FLUSH) 0.9 %
3 SYRINGE (ML) INJECTION EVERY 12 HOURS SCHEDULED
Status: DISCONTINUED | OUTPATIENT
Start: 2023-01-12 | End: 2023-01-12 | Stop reason: HOSPADM

## 2023-01-12 RX ORDER — OXYCODONE HYDROCHLORIDE AND ACETAMINOPHEN 5; 325 MG/1; MG/1
1 TABLET ORAL ONCE AS NEEDED
Status: COMPLETED | OUTPATIENT
Start: 2023-01-12 | End: 2023-01-12

## 2023-01-12 RX ORDER — NEOSTIGMINE METHYLSULFATE 1 MG/ML
INJECTION, SOLUTION INTRAVENOUS AS NEEDED
Status: DISCONTINUED | OUTPATIENT
Start: 2023-01-12 | End: 2023-01-12 | Stop reason: SURG

## 2023-01-12 RX ORDER — OXYCODONE HYDROCHLORIDE AND ACETAMINOPHEN 5; 325 MG/1; MG/1
2 TABLET ORAL EVERY 4 HOURS PRN
Status: DISCONTINUED | OUTPATIENT
Start: 2023-01-12 | End: 2023-01-13 | Stop reason: HOSPADM

## 2023-01-12 RX ORDER — ONDANSETRON 4 MG/1
4 TABLET, FILM COATED ORAL EVERY 6 HOURS PRN
Status: DISCONTINUED | OUTPATIENT
Start: 2023-01-12 | End: 2023-01-13 | Stop reason: HOSPADM

## 2023-01-12 RX ORDER — LEVOTHYROXINE SODIUM 0.05 MG/1
50 TABLET ORAL
Status: DISCONTINUED | OUTPATIENT
Start: 2023-01-13 | End: 2023-01-13

## 2023-01-12 RX ORDER — LIDOCAINE HYDROCHLORIDE 20 MG/ML
INJECTION, SOLUTION INTRAVENOUS AS NEEDED
Status: DISCONTINUED | OUTPATIENT
Start: 2023-01-12 | End: 2023-01-12 | Stop reason: SURG

## 2023-01-12 RX ORDER — DOCUSATE SODIUM 100 MG/1
100 CAPSULE, LIQUID FILLED ORAL 2 TIMES DAILY
Status: DISCONTINUED | OUTPATIENT
Start: 2023-01-12 | End: 2023-01-13 | Stop reason: HOSPADM

## 2023-01-12 RX ORDER — SODIUM CHLORIDE, SODIUM LACTATE, POTASSIUM CHLORIDE, CALCIUM CHLORIDE 600; 310; 30; 20 MG/100ML; MG/100ML; MG/100ML; MG/100ML
150 INJECTION, SOLUTION INTRAVENOUS CONTINUOUS
Status: DISCONTINUED | OUTPATIENT
Start: 2023-01-12 | End: 2023-01-13 | Stop reason: HOSPADM

## 2023-01-12 RX ORDER — CLOTRIMAZOLE AND BETAMETHASONE DIPROPIONATE 10; .64 MG/G; MG/G
1 CREAM TOPICAL EVERY 12 HOURS SCHEDULED
Status: DISCONTINUED | OUTPATIENT
Start: 2023-01-12 | End: 2023-01-13 | Stop reason: HOSPADM

## 2023-01-12 RX ORDER — IBUPROFEN 800 MG/1
800 TABLET ORAL EVERY 8 HOURS PRN
Status: DISCONTINUED | OUTPATIENT
Start: 2023-01-12 | End: 2023-01-13 | Stop reason: HOSPADM

## 2023-01-12 RX ORDER — MAGNESIUM HYDROXIDE 1200 MG/15ML
LIQUID ORAL AS NEEDED
Status: DISCONTINUED | OUTPATIENT
Start: 2023-01-12 | End: 2023-01-12 | Stop reason: HOSPADM

## 2023-01-12 RX ORDER — BUPIVACAINE HYDROCHLORIDE 2.5 MG/ML
INJECTION, SOLUTION INFILTRATION; PERINEURAL AS NEEDED
Status: DISCONTINUED | OUTPATIENT
Start: 2023-01-12 | End: 2023-01-12 | Stop reason: HOSPADM

## 2023-01-12 RX ORDER — SIMETHICONE 80 MG
80 TABLET,CHEWABLE ORAL 4 TIMES DAILY PRN
Status: DISCONTINUED | OUTPATIENT
Start: 2023-01-12 | End: 2023-01-13 | Stop reason: HOSPADM

## 2023-01-12 RX ORDER — SODIUM CHLORIDE 9 MG/ML
40 INJECTION, SOLUTION INTRAVENOUS AS NEEDED
Status: DISCONTINUED | OUTPATIENT
Start: 2023-01-12 | End: 2023-01-12 | Stop reason: HOSPADM

## 2023-01-12 RX ORDER — TOPIRAMATE 25 MG/1
100 TABLET ORAL 2 TIMES DAILY
Status: DISCONTINUED | OUTPATIENT
Start: 2023-01-12 | End: 2023-01-13 | Stop reason: HOSPADM

## 2023-01-12 RX ORDER — ACETAMINOPHEN 500 MG
1000 TABLET ORAL ONCE
Status: COMPLETED | OUTPATIENT
Start: 2023-01-12 | End: 2023-01-12

## 2023-01-12 RX ORDER — KETAMINE HYDROCHLORIDE 10 MG/ML
INJECTION INTRAMUSCULAR; INTRAVENOUS AS NEEDED
Status: DISCONTINUED | OUTPATIENT
Start: 2023-01-12 | End: 2023-01-12 | Stop reason: SURG

## 2023-01-12 RX ORDER — ONDANSETRON 2 MG/ML
4 INJECTION INTRAMUSCULAR; INTRAVENOUS EVERY 6 HOURS PRN
Status: DISCONTINUED | OUTPATIENT
Start: 2023-01-12 | End: 2023-01-13 | Stop reason: HOSPADM

## 2023-01-12 RX ORDER — ONDANSETRON 2 MG/ML
4 INJECTION INTRAMUSCULAR; INTRAVENOUS EVERY 6 HOURS PRN
Status: DISCONTINUED | OUTPATIENT
Start: 2023-01-12 | End: 2023-01-12 | Stop reason: SDUPTHER

## 2023-01-12 RX ADMIN — DEXMEDETOMIDINE 4 MCG: 100 INJECTION, SOLUTION, CONCENTRATE INTRAVENOUS at 11:00

## 2023-01-12 RX ADMIN — OXYCODONE HYDROCHLORIDE AND ACETAMINOPHEN 2 TABLET: 5; 325 TABLET ORAL at 22:28

## 2023-01-12 RX ADMIN — HYDROMORPHONE HYDROCHLORIDE 1 MG: 1 INJECTION, SOLUTION INTRAMUSCULAR; INTRAVENOUS; SUBCUTANEOUS at 12:41

## 2023-01-12 RX ADMIN — DEXMEDETOMIDINE 8 MCG: 100 INJECTION, SOLUTION, CONCENTRATE INTRAVENOUS at 10:49

## 2023-01-12 RX ADMIN — PROPOFOL 200 MG: 10 INJECTION, EMULSION INTRAVENOUS at 10:22

## 2023-01-12 RX ADMIN — MIDAZOLAM HYDROCHLORIDE 1 MG: 1 INJECTION, SOLUTION INTRAMUSCULAR; INTRAVENOUS at 09:37

## 2023-01-12 RX ADMIN — ONDANSETRON 4 MG: 2 INJECTION INTRAMUSCULAR; INTRAVENOUS at 09:22

## 2023-01-12 RX ADMIN — DEXMEDETOMIDINE 4 MCG: 100 INJECTION, SOLUTION, CONCENTRATE INTRAVENOUS at 10:55

## 2023-01-12 RX ADMIN — SODIUM CHLORIDE, POTASSIUM CHLORIDE, SODIUM LACTATE AND CALCIUM CHLORIDE 150 ML/HR: 600; 310; 30; 20 INJECTION, SOLUTION INTRAVENOUS at 15:25

## 2023-01-12 RX ADMIN — KETAMINE HYDROCHLORIDE 25 MG: 10 INJECTION INTRAMUSCULAR; INTRAVENOUS at 10:22

## 2023-01-12 RX ADMIN — SIMETHICONE 80 MG: 80 TABLET, CHEWABLE ORAL at 21:43

## 2023-01-12 RX ADMIN — OXYCODONE HYDROCHLORIDE 5 MG: 5 TABLET ORAL at 01:38

## 2023-01-12 RX ADMIN — GLYCOPYRROLATE 0.6 MG: 0.2 INJECTION INTRAMUSCULAR; INTRAVENOUS at 11:25

## 2023-01-12 RX ADMIN — SODIUM CHLORIDE, POTASSIUM CHLORIDE, SODIUM LACTATE AND CALCIUM CHLORIDE 9 ML/HR: 600; 310; 30; 20 INJECTION, SOLUTION INTRAVENOUS at 08:40

## 2023-01-12 RX ADMIN — IBUPROFEN 600 MG: 600 TABLET ORAL at 05:49

## 2023-01-12 RX ADMIN — ROCURONIUM BROMIDE 40 MG: 10 INJECTION, SOLUTION INTRAVENOUS at 10:22

## 2023-01-12 RX ADMIN — DEXMEDETOMIDINE 8 MCG: 100 INJECTION, SOLUTION, CONCENTRATE INTRAVENOUS at 11:22

## 2023-01-12 RX ADMIN — KETAMINE HYDROCHLORIDE 15 MG: 10 INJECTION INTRAMUSCULAR; INTRAVENOUS at 10:36

## 2023-01-12 RX ADMIN — FAMOTIDINE 20 MG: 10 INJECTION INTRAVENOUS at 21:14

## 2023-01-12 RX ADMIN — FAMOTIDINE 20 MG: 10 INJECTION, SOLUTION INTRAVENOUS at 09:22

## 2023-01-12 RX ADMIN — KETAMINE HYDROCHLORIDE 10 MG: 10 INJECTION INTRAMUSCULAR; INTRAVENOUS at 11:22

## 2023-01-12 RX ADMIN — ONDANSETRON 4 MG: 2 INJECTION INTRAMUSCULAR; INTRAVENOUS at 04:03

## 2023-01-12 RX ADMIN — TOPIRAMATE 100 MG: 25 TABLET, FILM COATED ORAL at 21:14

## 2023-01-12 RX ADMIN — GLYCOPYRROLATE 0.2 MG: 0.2 INJECTION INTRAMUSCULAR; INTRAVENOUS at 10:22

## 2023-01-12 RX ADMIN — ACETAMINOPHEN 1000 MG: 500 TABLET, FILM COATED ORAL at 09:03

## 2023-01-12 RX ADMIN — CLOTRIMAZOLE AND BETAMETHASONE DIPROPIONATE 1 APPLICATION: 10; .5 CREAM TOPICAL at 21:14

## 2023-01-12 RX ADMIN — DOCUSATE SODIUM 100 MG: 100 CAPSULE ORAL at 21:14

## 2023-01-12 RX ADMIN — CEFAZOLIN SODIUM 2 G: 2 SOLUTION INTRAVENOUS at 10:22

## 2023-01-12 RX ADMIN — SODIUM CHLORIDE, POTASSIUM CHLORIDE, SODIUM LACTATE AND CALCIUM CHLORIDE 125 ML/HR: 600; 310; 30; 20 INJECTION, SOLUTION INTRAVENOUS at 01:39

## 2023-01-12 RX ADMIN — LEVOTHYROXINE SODIUM 125 MCG: 75 TABLET ORAL at 05:49

## 2023-01-12 RX ADMIN — DEXMEDETOMIDINE 8 MCG: 100 INJECTION, SOLUTION, CONCENTRATE INTRAVENOUS at 10:22

## 2023-01-12 RX ADMIN — IBUPROFEN 600 MG: 600 TABLET ORAL at 17:39

## 2023-01-12 RX ADMIN — LIDOCAINE HYDROCHLORIDE 100 MG: 20 INJECTION, SOLUTION INTRAVENOUS at 10:22

## 2023-01-12 RX ADMIN — NEOSTIGMINE METHYLSULFATE 4 MG: 0.5 INJECTION INTRAVENOUS at 11:25

## 2023-01-12 RX ADMIN — DEXMEDETOMIDINE 4 MCG: 100 INJECTION, SOLUTION, CONCENTRATE INTRAVENOUS at 10:44

## 2023-01-12 RX ADMIN — OXYCODONE HYDROCHLORIDE AND ACETAMINOPHEN 1 TABLET: 5; 325 TABLET ORAL at 12:55

## 2023-01-12 RX ADMIN — GLYCOPYRROLATE 0.2 MG: 0.2 INJECTION INTRAMUSCULAR; INTRAVENOUS at 11:33

## 2023-01-12 RX ADMIN — OXYCODONE HYDROCHLORIDE AND ACETAMINOPHEN 2 TABLET: 5; 325 TABLET ORAL at 17:39

## 2023-01-12 RX ADMIN — DEXAMETHASONE SODIUM PHOSPHATE 8 MG: 4 INJECTION, SOLUTION INTRAMUSCULAR; INTRAVENOUS at 09:24

## 2023-01-12 NOTE — CASE MANAGEMENT/SOCIAL WORK
Continued Stay Note  ALICE Putnam     Patient Name: Aruna Hammonds  MRN: [id number]  Today's Date: 1/12/2023    Admit Date: 1/11/2023        Discharge Plan     Row Name 01/12/23 1301       Plan    Plan Comments Attempted to see patient again this afternoon and she remains in surgery. CM will follow.               Discharge Codes    No documentation.                     Kristin Henriquez RN

## 2023-01-12 NOTE — OP NOTE
OPERATIVE REPORT    PROCEDURE:  DIAGNOSTIC LAPAROSCOPY, LEFT OOPHORECTOMY    PREOP DX:  Severe LLQ pain, history of hysterectomy, history of endometriosis    POSTOP DX:  same    SURGEON:  John Pro MD    ASSISTANT:  Assistant: Felipe Conti CSA was responsible for performing the following activities: Retraction, Suction, Irrigation, Suturing, Closing, Placing Dressing and Held/Positioned Camera and their skilled assistance was necessary for the success of this case.    ANESTHESIA:  GETA    FINDINGS:  Enlarged left ovary, normal R ovary.    COMPLICATIONS:  none    EBL:  150cc    IVFS:  600cc    URINE OUTPUT:  150cc    SPECIMENS: left ovary    ANTIBIOTICS:  3 g kefzol          DESCRIPTION OF THE PROCEDURE:    After GETA had been induced and time out done, pt was placed in the dorso-lithotomy position and prepped and draped.  No antibiotics were given.    A sponge stick was placed in the vagina.      A LUQ 5mm port was placed without incident and the abdomen was insufflated with CO2.  Accessory ports were placed:  5mm in the infraumbilicus, 8mm in the RLQ, and 10mm in the LLQ.  These entry sites were all infiltrated with 1/4 % marcaine without before the incision was made.    Pelvic and abdominal cavities were seen in their entirety.  There were no abnormalities.      The left ovary was seen and was brought up out of the pelvis.  It was attached to the pelvic sidewall by the infundibulopelvic ligament.  It was elevated out of the pelvis, far from the pelvic sidewall and detached with the enseal.    The right ovary was seen and was small and normal and left in place.    An endobag was used to contain the ovary and it was brought up to the fascial opening.  It was partially drained.  In order to remove the endobag intact, the assistant extended the fascial opening of the LLQ port site and the endobag was removed intact.      It was noted that there was significant bleeding from this extension, so  the skin incision was opened further for exposure and eventually the fascial extension was closed with 0-vicryl in a running interlocking stitch till hemostatic.     The peritoneal cavity was reinspected.  The L infundibulopelvic pedicle was inspected and found to be hemostatic.  Any blood was irrigated till clear.  The peritoneal opening of the 10mm trochar and found to be hemostatic, even under decreased insufflation pressure.      There was no active bleeding at the termination of the procedure.     All instruments were removed and the incisions were reapproximated with 4-0 monocryl in a subcuticular continuous fashion.      Pt tolerated procedure well and went to the RR in satis condition.    All sponge, instrument and needle counts were correct x 3 according to the OR personnel.    John Pro MD  11:49 EST  01/12/23

## 2023-01-12 NOTE — H&P
PREOPERATIVE HISTORY AND PHYSICAL      Patient Care Team:  Familia Gonzalez MD as PCP - General (Internal Medicine)    Chief complaint: Pelvic pain    Pt is a 42 y.o. No obstetric history on file.  No LMP recorded. Patient has had a hysterectomy.     HPI:History of Present Illness  Pt was admitted yesterday evening with worsening pelvic pain.  Seen in the office by Dr Phillip; pt had an u/s yesterday that showed: surgically absent uterus, R ovary not seen due to bowel, L ovary with 2 cysts: 3.75, 3.35 with no evidence of torsion.  No free fluid.     Pt states her pain is a 9/10; started about 4 months ago and has gotten worse over the past month.  Got bad enough to come to the office yesterday.      Pt denies fever, UTI symptoms, no change in elimination habits. Pt had hysterectomy for endometriosis.      Pelvic Pain  The patient's primary symptoms include pelvic pain. Pertinent negatives include no dysuria or fever.       PMHx:   Past Medical History:   Diagnosis Date   • ADHD (attention deficit hyperactivity disorder)    • Bipolar 1 disorder (HCC)    • Depression    • Hypertension    • Hypothyroid    • Migraines    • Psychosis (HCC)    • PTSD (post-traumatic stress disorder)        Current problem list:  Patient Active Problem List   Diagnosis   • Family history of colon cancer   • Chronic idiopathic constipation   • HUMPHREY (nonalcoholic steatohepatitis)   • hx: Rectal bleeding   • Ovarian cyst   • Pelvic pain   • H/O abdominal hysterectomy   • History of endometriosis   • History of appendectomy   • Encounter for cholecystectomy       PSHx:   Past Surgical History:   Procedure Laterality Date   • APPENDECTOMY     • CHOLECYSTECTOMY     • CHOLECYSTECTOMY WITH INTRAOPERATIVE CHOLANGIOGRAM N/A 04/14/2022    Procedure: CHOLECYSTECTOMY LAPAROSCOPIC INTRAOPERATIVE CHOLANGIOGRAM;  Surgeon: Mimi Sahu DO;  Location: [facility];  Service: General;  Laterality: N/A;   • COLONOSCOPY     • COLONOSCOPY W/ POLYPECTOMY  N/A 2022    Procedure: Colonoscopy with polypectomy;  Surgeon: Mimi Sahu DO;  Location:  [facility] OR;  Service: Gastroenterology;  Laterality: N/A;  rectal polyp   • ENDOSCOPY N/A 2022    Procedure: Esophagogastroduodenoscopy with biopsy;  Surgeon: Mimi Sahu DO;  Location:  [facility] OR;  Service: Gastroenterology;  Laterality: N/A;  gastritis  CAMRYN test   • HYSTERECTOMY         Social Hx:   Social History     Socioeconomic History   • Marital status:    Tobacco Use   • Smoking status: Every Day     Packs/day: 1.00     Types: Cigarettes     Last attempt to quit: 2022     Years since quittin.8   • Smokeless tobacco: Never   • Tobacco comments:     caff use   Vaping Use   • Vaping Use: Never used   Substance and Sexual Activity   • Alcohol use: No   • Drug use: No   • Sexual activity: Defer       FHx:   Family History   Problem Relation Age of Onset   • Heart disease Mother 44   • Migraines Mother    • Breast cancer Mother    • Heart attack Mother 44   • Heart disease Father 46   • Hypertension Father    • Colon cancer Father    • Kidney disease Father    • Heart attack Father 65   • Stroke Father    • Diabetes Father    • Aneurysm Paternal Grandmother        Debilities/Disabilities Identified: None    Emotional Behavior: Appropriate    PGyn Hx:  otherwise noncontributory    POBHx:   OB History   No obstetric history on file.       Allergies: Sulfa antibiotics    Medications:   Facility-Administered Medications Prior to Admission   Medication Dose Route Frequency Provider Last Rate Last Admin   • lactulose (CHRONULAC) 10 GM/15ML solution 10 g  10 g Oral Daily Mimi Sahu DO         Medications Prior to Admission   Medication Sig Dispense Refill Last Dose   • ALPRAZolam (XANAX) 0.25 MG tablet    2023   • amLODIPine (NORVASC) 2.5 MG tablet Take 2.5 mg by mouth Daily.   2023   • levothyroxine (SYNTHROID, LEVOTHROID) 125 MCG tablet Take 125 mcg by mouth Every  Morning.   1/11/2023   • linaclotide (LINZESS) 145 MCG capsule capsule Take 1 capsule by mouth Every Morning Before Breakfast. 30 capsule 11 1/11/2023   • LITHIUM PO Take 450 mg by mouth 2 (Two) Times a Day. 1 in am and 2 at bedtime   1/10/2023   • omeprazole (priLOSEC) 20 MG capsule Take 20 mg by mouth Daily.   1/11/2023   • ondansetron ODT (ZOFRAN-ODT) 4 MG disintegrating tablet    1/11/2023   • potassium chloride (MICRO-K) 10 MEQ CR capsule    1/11/2023   • topiramate (TOPAMAX) 100 MG tablet Take 100 mg by mouth 2 (Two) Times a Day.   1/11/2023   • gabapentin (NEURONTIN) 100 MG capsule Take 300 mg by mouth 3 (Three) Times a Day.   Unknown   • Humira 40 MG/0.8ML Prefilled Syringe Kit injection    1/3/2023   • hydrOXYzine (ATARAX) 50 MG tablet Take 25 mg by mouth 2 (Two) Times a Day. 1 in am, 2 at night   More than a month   • levothyroxine (SYNTHROID, LEVOTHROID) 50 MCG tablet    Unknown                              Current Facility-Administered Medications:   •  acetaminophen (TYLENOL) tablet 650 mg, 650 mg, Oral, Q6H PRN, Ramin Phillip DO, 650 mg at 01/11/23 1731  •  ALPRAZolam (XANAX) tablet 0.25 mg, 0.25 mg, Oral, BID PRN, Tyreegle, Brittani L, APRN  •  amLODIPine (NORVASC) tablet 2.5 mg, 2.5 mg, Oral, Q24H, Tingle, Brittani L, APRN  •  docusate sodium (COLACE) capsule 100 mg, 100 mg, Oral, BID, Ramin Phillip DO, 100 mg at 01/11/23 2122  •  famotidine (PEPCID) injection 20 mg, 20 mg, Intravenous, BID, Ramin Phillip DO, 20 mg at 01/11/23 2126  •  ibuprofen (ADVIL,MOTRIN) tablet 600 mg, 600 mg, Oral, Q6H, Ramin Phillip DO, 600 mg at 01/12/23 0549  •  lactated ringers infusion, 125 mL/hr, Intravenous, Continuous, Ramin Phillip, , Last Rate: 125 mL/hr at 01/12/23 0139, 125 mL/hr at 01/12/23 0139  •  levothyroxine (SYNTHROID, LEVOTHROID) tablet 125 mcg, 125 mcg, Oral, Q AM, Brittani Lott, APRN, 125 mcg at 01/12/23 0549  •  ondansetron (ZOFRAN) injection 4 mg, 4 mg, Intravenous, Q6H PRN, Joe  "Tanisha FLEMING, DO, 4 mg at 01/12/23 0403  •  oxyCODONE (ROXICODONE) immediate release tablet 5 mg, 5 mg, Oral, Q4H PRN, Ramin Phillip FERCHO, DO, 5 mg at 01/12/23 0138  •  potassium chloride (K-DUR,KLOR-CON) CR tablet 10 mEq, 10 mEq, Oral, Daily, Tingle, Brittani L, APRN  •  sodium chloride 0.9 % flush 10 mL, 10 mL, Intravenous, PRN, Tingle, Brittani L, APRN  •  topiramate (TOPAMAX) tablet 100 mg, 100 mg, Oral, Q12H, Tingle, Brittani L, APRN, 100 mg at 01/11/23 2122        Review of Systems   Constitutional: Negative.  Negative for fever.   HENT: Negative.    Eyes: Negative.    Respiratory: Negative.    Cardiovascular: Negative.    Gastrointestinal: Negative.    Endocrine: Negative.    Genitourinary: Positive for pelvic pain. Negative for difficulty urinating, dyspareunia and dysuria.   Musculoskeletal: Negative.    Skin: Negative.    Allergic/Immunologic: Negative.    Neurological: Negative.    Hematological: Negative.    Psychiatric/Behavioral: Negative.        Vital Signs  /54 (BP Location: Left arm, Patient Position: Lying)   Pulse (!) 48   Temp 97.6 °F (36.4 °C) (Oral)   Resp 18   Ht 175.3 cm (69\")   Wt 83.9 kg (185 lb)   SpO2 98%   BMI 27.32 kg/m²     Physical Exam  Vitals and nursing note reviewed.   Constitutional:       Appearance: She is well-developed.   HENT:      Head: Normocephalic and atraumatic.   Cardiovascular:      Rate and Rhythm: Normal rate.   Pulmonary:      Effort: Pulmonary effort is normal.   Abdominal:      General: There is no distension.      Palpations: Abdomen is soft. There is no mass.      Tenderness: There is no abdominal tenderness. There is no guarding.   Musculoskeletal:         General: No tenderness or deformity. Normal range of motion.      Cervical back: Normal range of motion.   Skin:     General: Skin is warm and dry.      Coloration: Skin is not pale.      Findings: No erythema or rash.   Neurological:      Mental Status: She is alert and oriented to person, place, and " time.   Psychiatric:         Behavior: Behavior normal.         Thought Content: Thought content normal.         Judgment: Judgment normal.             IMPRESSION:    Pelvic pain with L ovarian cyst. Hx hysterectomy, hx endometriosis                                    PLAN:    Procedure(s):  DIAGNOSTIC LAPAROSCOPY, POSSIBLE BILATERAL SALPINGOOPHORECTOMY, POSSIBLE EXPLORATORY LAPAROTOMY    RISKS, ALTERNATIVES, COMPLICATIONS OF THE PROCEDURE INCLUDING BUT NOT LIMITED TO:    INTRAOPERATIVE RISKS: INJURY TO INTERNAL AND ADJACENT ORGANS AND STRUCTURES (BOWEL, BLADDER, URETER,BLOOD VESSELS) OR HEMORRHAGE REQUIRING FURTHER SURGERY (LAPAROTOMY),  POSSIBLE NON-DIAGNOSTIC FINDINGS, DISCOVERY OF POSSIBLE MALIGNANCY, INFECTION, AND DEATH;   POSTOP COMPLICATIONS: BLEEDING, INFECTION (REQUIRING POSSIBLE REOPERATION), FAILURE OF GOAL OF SURGERY AND RECURRENCE OF ORIGINAL SYMPTOMS, PNEUMONIA, PULMONARY EMBOLISM, AND DEATH;  WERE EXPLAINED TO THE PT WHO VERBALIZED HER UNDERSTANDING.             I discussed the patients findings and my recommendations with patient and family.     John Pro MD  01/12/23  08:18 EST

## 2023-01-12 NOTE — ANESTHESIA PREPROCEDURE EVALUATION
Anesthesia Evaluation     Patient summary reviewed and Nursing notes reviewed   no history of anesthetic complications:  NPO Solid Status: > 8 hours  NPO Liquid Status: > 8 hours           Airway   Mallampati: II  TM distance: >3 FB  Neck ROM: full  No difficulty expected  Dental    (+) edentulous    Pulmonary     breath sounds clear to auscultation  (+) a smoker Former,   Cardiovascular   Exercise tolerance: good (4-7 METS)    Rhythm: regular  Rate: normal    (+) hypertension (pt states she has never taken her medication and her bp has never been high), hyperlipidemia,       Neuro/Psych  (+) headaches, numbness (jacobo hands and feet), psychiatric history Anxiety, Depression, Bipolar and ADHD,    GI/Hepatic/Renal/Endo    (+)  GERD well controlled, GI bleeding lower resolved, liver disease ( HUMPHREY (nonalcoholic steatohepatitis)), thyroid problem hypothyroidism    Musculoskeletal (-) negative ROS    Abdominal    Substance History   (+) drug use (history of pain pill abuse and meth none since 2018)     OB/GYN          Other - negative ROS                       Anesthesia Plan    ASA 2     general     intravenous induction     Anesthetic plan, risks, benefits, and alternatives have been provided, discussed and informed consent has been obtained with: patient.    Use of blood products discussed with patient  Consented to blood products.       CODE STATUS:

## 2023-01-12 NOTE — PLAN OF CARE
Problem: Adult Inpatient Plan of Care  Goal: Plan of Care Review  Outcome: Ongoing, Progressing  Flowsheets (Taken 1/12/2023 1807)  Progress: improving  Plan of Care Reviewed With: patient  Outcome Evaluation: VSS, HR low, anesthesia aware. pt reports significant pain, oral percocet and ibuprofen given. urine output adequte. pt due to ambulate post surgery.  Goal: Patient-Specific Goal (Individualized)  Outcome: Ongoing, Progressing  Flowsheets (Taken 1/12/2023 1807)  Patient-Specific Goals (Include Timeframe): control pain with PRN percocet overnight  Individualized Care Needs: pt likes sprite and ice  Goal: Absence of Hospital-Acquired Illness or Injury  Outcome: Ongoing, Progressing  Intervention: Identify and Manage Fall Risk  Recent Flowsheet Documentation  Taken 1/12/2023 1345 by Dania Banks RN  Safety Promotion/Fall Prevention: safety round/check completed  Intervention: Prevent and Manage VTE (Venous Thromboembolism) Risk  Recent Flowsheet Documentation  Taken 1/12/2023 1345 by Dania Banks RN  Activity Management: bedrest  VTE Prevention/Management:   bilateral   sequential compression devices on  Intervention: Prevent Infection  Recent Flowsheet Documentation  Taken 1/12/2023 1345 by Dania Banks RN  Infection Prevention:   cohorting utilized   environmental surveillance performed   equipment surfaces disinfected   hand hygiene promoted   personal protective equipment utilized   rest/sleep promoted   single patient room provided   visitors restricted/screened  Goal: Optimal Comfort and Wellbeing  Outcome: Ongoing, Progressing  Intervention: Monitor Pain and Promote Comfort  Recent Flowsheet Documentation  Taken 1/12/2023 1345 by Dania Banks RN  Pain Management Interventions:   pillow support provided   position adjusted   relaxation techniques promoted  Intervention: Provide Person-Centered Care  Recent Flowsheet Documentation  Taken 1/12/2023 1345 by Dania Banks  RN  Trust Relationship/Rapport:   care explained   choices provided   emotional support provided   empathic listening provided   questions answered   questions encouraged   reassurance provided   thoughts/feelings acknowledged  Goal: Readiness for Transition of Care  Outcome: Ongoing, Progressing     Problem: Pain Acute  Goal: Acceptable Pain Control and Functional Ability  Outcome: Ongoing, Progressing  Intervention: Develop Pain Management Plan  Recent Flowsheet Documentation  Taken 1/12/2023 1345 by Dania Banks RN  Pain Management Interventions:   pillow support provided   position adjusted   relaxation techniques promoted   Goal Outcome Evaluation:  Plan of Care Reviewed With: patient        Progress: improving  Outcome Evaluation: VSS, HR low, anesthesia aware. pt reports significant pain, oral percocet and ibuprofen given. urine output adequte. pt due to ambulate post surgery.

## 2023-01-12 NOTE — ANESTHESIA POSTPROCEDURE EVALUATION
Patient: Aruna Hammonds    Procedure Summary     Date: 01/12/23 Room / Location:  [facility] 4 /  [facility]    Anesthesia Start: 1017 Anesthesia Stop: 1145    Procedure: DIAGNOSTIC LAPAROSCOPY, left oophorectomy (Abdomen) Diagnosis:       Pelvic pain      Bilateral ovarian cysts      H/O abdominal hysterectomy      (Pelvic pain [R10.2])      (Bilateral ovarian cysts [N83.201, N83.202])      (H/O abdominal hysterectomy [Z90.710])    Surgeons: John Pro MD Provider: Cam Sharma CRNA    Anesthesia Type: general ASA Status: 2          Anesthesia Type: general    Vitals  Vitals Value Taken Time   BP 86/66 01/12/23 1320   Temp 97.5 °F (36.4 °C) 01/12/23 1145   Pulse 61 01/12/23 1325   Resp 12 01/12/23 1320   SpO2 97 % 01/12/23 1325   Vitals shown include unvalidated device data.        Post Anesthesia Care and Evaluation    Patient location during evaluation: PHASE II  Patient participation: complete - patient participated  Level of consciousness: awake  Pain management: adequate    Airway patency: patent  Anesthetic complications: No anesthetic complications  PONV Status: none  Cardiovascular status: acceptable  Respiratory status: acceptable  Hydration status: acceptable

## 2023-01-12 NOTE — PLAN OF CARE
Goal Outcome Evaluation:  Plan of Care Reviewed With: patient, spouse        Progress: no change  Outcome Evaluation: HR low this morning, pain managed with PO medications, Nausea treated with IV Zofran, NPO after midnight except for a few sips of water to swallow medications. Anticipating surgery today.

## 2023-01-12 NOTE — CASE MANAGEMENT/SOCIAL WORK
Continued Stay Note  ALICE Putnam     Patient Name: Aruna Hammonds  MRN: [id number]  Today's Date: 1/12/2023    Admit Date: 1/11/2023        Discharge Plan     Row Name 01/12/23 0902       Plan    Plan Comments Attempted to see patient this morning and she is currently off floor in surgery. CM will follow.               Discharge Codes    No documentation.                     Kristin Henriquez RN

## 2023-01-12 NOTE — ANESTHESIA PROCEDURE NOTES
Airway  Urgency: elective    Date/Time: 1/12/2023 10:23 AM  Airway not difficult    General Information and Staff    Patient location during procedure: OR    Indications and Patient Condition  Indications for airway management: airway protection    Preoxygenated: yes  Mask difficulty assessment: 0 - not attempted    Final Airway Details  Final airway type: endotracheal airway      Successful airway: ETT  Cuffed: yes   Successful intubation technique: direct laryngoscopy  Endotracheal tube insertion site: oral  Blade: Ng  Blade size: 3  ETT size (mm): 7.0  Cormack-Lehane Classification: grade I - full view of glottis  Placement verified by: chest auscultation and capnometry   Measured from: lips  ETT/EBT  to lips (cm): 20  Number of attempts at approach: 1  Assessment: lips, teeth, and gum same as pre-op and atraumatic intubation

## 2023-01-12 NOTE — PAYOR COMM NOTE
"Aruna Hammonds (42 y.o. Female)     ATTN: NURSE REVIEWER  RE: OBSERVATION ADMIT- AUTH REQUEST FOR PROCEDURES  REF#[id number]  PLS REPLY TO GIL [facility]ION [phone] FAX# [phone]      Date of Birth   1980    Social Security Number       Address   [address] [place] [address]    Home Phone   [phone]    MRN   [id number]       [employer]   Patient Refused    Marital Status                               Admission Date   1/11/23    Admission Type   Elective    Admitting Provider   Tanisha Diaz DO    Attending Provider   Tanisha Diaz DO    Department, Room/Bed   [facility], 1117/1       Discharge Date       Discharge Disposition       Discharge Destination                               Attending Provider: Tanisha Diaz DO    Allergies: Sulfa Antibiotics    Isolation: None   Infection: None   Code Status: CPR    Ht: 175.3 cm (69\")   Wt: 83.9 kg (185 lb)    Admission Cmt: None   Principal Problem: Pelvic pain [R10.2]                 Active Insurance as of 1/11/2023     Primary Coverage     Payor Plan Insurance Group Employer/Plan Group    [facility] MEDICARE REPLACEMENT [employer] MEDICARE REPLACEMENT      Payor Plan Address Payor Plan Phone Number Payor Plan Fax Number Effective Dates    [address] [address] [phone]  5/6/2019 - None Entered    [facility] [id number]       Subscriber Name Subscriber Birth Date Member ID       Aruna Hammonds 1980 [id number]                 Emergency Contacts      (Rel.) Home Phone Work Phone Mobile Phone    Hilary aHmmonds (Spouse) [phone] -- --               History & Physical      Inocencia, John Bartlett MD at 01/12/23 0757           PREOPERATIVE HISTORY AND PHYSICAL      Patient Care Team:  Familia Gonzalez MD as PCP - General (Internal Medicine)    Chief complaint: Pelvic pain    Pt is a 42 y.o. No obstetric history on file.  No LMP recorded. Patient has had a " hysterectomy.     HPI:History of Present Illness  Pt was admitted yesterday evening with worsening pelvic pain.  Seen in the office by Dr Phillip; pt had an u/s yesterday that showed: surgically absent uterus, R ovary not seen due to bowel, L ovary with 2 cysts: 3.75, 3.35 with no evidence of torsion.  No free fluid.     Pt states her pain is a 9/10; started about 4 months ago and has gotten worse over the past month.  Got bad enough to come to the office yesterday.      Pt denies fever, UTI symptoms, no change in elimination habits. Pt had hysterectomy for endometriosis.      Pelvic Pain  The patient's primary symptoms include pelvic pain. Pertinent negatives include no dysuria or fever.       PMHx:   Past Medical History:   Diagnosis Date   • ADHD (attention deficit hyperactivity disorder)    • Bipolar 1 disorder (HCC)    • Depression    • Hypertension    • Hypothyroid    • Migraines    • Psychosis (HCC)    • PTSD (post-traumatic stress disorder)        Current problem list:  Patient Active Problem List   Diagnosis   • Family history of colon cancer   • Chronic idiopathic constipation   • HUMPHREY (nonalcoholic steatohepatitis)   • hx: Rectal bleeding   • Ovarian cyst   • Pelvic pain   • H/O abdominal hysterectomy   • History of endometriosis   • History of appendectomy   • Encounter for cholecystectomy       PSHx:   Past Surgical History:   Procedure Laterality Date   • APPENDECTOMY     • CHOLECYSTECTOMY     • CHOLECYSTECTOMY WITH INTRAOPERATIVE CHOLANGIOGRAM N/A 04/14/2022    Procedure: CHOLECYSTECTOMY LAPAROSCOPIC INTRAOPERATIVE CHOLANGIOGRAM;  Surgeon: Mimi Sahu DO;  Location: [facility] OR;  Service: General;  Laterality: N/A;   • COLONOSCOPY     • COLONOSCOPY W/ POLYPECTOMY N/A 5/24/2022    Procedure: Colonoscopy with polypectomy;  Surgeon: Mimi Sahu DO;  Location: [facility] OR;  Service: Gastroenterology;  Laterality: N/A;  rectal polyp   • ENDOSCOPY N/A 5/24/2022    Procedure:  Esophagogastroduodenoscopy with biopsy;  Surgeon: Mimi Sahu DO;  Location: [facility] OR;  Service: Gastroenterology;  Laterality: N/A;  gastritis  CAMRYN test   • HYSTERECTOMY         Social Hx:   Social History     Socioeconomic History   • Marital status:    Tobacco Use   • Smoking status: Every Day     Packs/day: 1.00     Types: Cigarettes     Last attempt to quit: 2022     Years since quittin.8   • Smokeless tobacco: Never   • Tobacco comments:     caff use   Vaping Use   • Vaping Use: Never used   Substance and Sexual Activity   • Alcohol use: No   • Drug use: No   • Sexual activity: Defer       FHx:   Family History   Problem Relation Age of Onset   • Heart disease Mother 44   • Migraines Mother    • Breast cancer Mother    • Heart attack Mother 44   • Heart disease Father 46   • Hypertension Father    • Colon cancer Father    • Kidney disease Father    • Heart attack Father 65   • Stroke Father    • Diabetes Father    • Aneurysm Paternal Grandmother        Debilities/Disabilities Identified: None    Emotional Behavior: Appropriate    PGyn Hx:  otherwise noncontributory    POBHx:   OB History   No obstetric history on file.       Allergies: Sulfa antibiotics    Medications:   Facility-Administered Medications Prior to Admission   Medication Dose Route Frequency Provider Last Rate Last Admin   • lactulose (CHRONULAC) 10 GM/15ML solution 10 g  10 g Oral Daily Mimi Sahu DO         Medications Prior to Admission   Medication Sig Dispense Refill Last Dose   • ALPRAZolam (XANAX) 0.25 MG tablet    2023   • amLODIPine (NORVASC) 2.5 MG tablet Take 2.5 mg by mouth Daily.   2023   • levothyroxine (SYNTHROID, LEVOTHROID) 125 MCG tablet Take 125 mcg by mouth Every Morning.   2023   • linaclotide (LINZESS) 145 MCG capsule capsule Take 1 capsule by mouth Every Morning Before Breakfast. 30 capsule 11 2023   • LITHIUM PO Take 450 mg by mouth 2 (Two) Times a Day. 1 in am and 2  at bedtime   1/10/2023   • omeprazole (priLOSEC) 20 MG capsule Take 20 mg by mouth Daily.   1/11/2023   • ondansetron ODT (ZOFRAN-ODT) 4 MG disintegrating tablet    1/11/2023   • potassium chloride (MICRO-K) 10 MEQ CR capsule    1/11/2023   • topiramate (TOPAMAX) 100 MG tablet Take 100 mg by mouth 2 (Two) Times a Day.   1/11/2023   • gabapentin (NEURONTIN) 100 MG capsule Take 300 mg by mouth 3 (Three) Times a Day.   Unknown   • Humira 40 MG/0.8ML Prefilled Syringe Kit injection    1/3/2023   • hydrOXYzine (ATARAX) 50 MG tablet Take 25 mg by mouth 2 (Two) Times a Day. 1 in am, 2 at night   More than a month   • levothyroxine (SYNTHROID, LEVOTHROID) 50 MCG tablet    Unknown                              Current Facility-Administered Medications:   •  acetaminophen (TYLENOL) tablet 650 mg, 650 mg, Oral, Q6H PRN, Ramin Phillip DO, 650 mg at 01/11/23 1731  •  ALPRAZolam (XANAX) tablet 0.25 mg, 0.25 mg, Oral, BID PRN, Brittani Lott L, APRN  •  amLODIPine (NORVASC) tablet 2.5 mg, 2.5 mg, Oral, Q24H, Tingle, Brittani L, APRN  •  docusate sodium (COLACE) capsule 100 mg, 100 mg, Oral, BID, Ramin Phillip DO, 100 mg at 01/11/23 2122  •  famotidine (PEPCID) injection 20 mg, 20 mg, Intravenous, BID, Ramin Phillip DO, 20 mg at 01/11/23 2126  •  ibuprofen (ADVIL,MOTRIN) tablet 600 mg, 600 mg, Oral, Q6H, Ramin Phillip DO, 600 mg at 01/12/23 0549  •  lactated ringers infusion, 125 mL/hr, Intravenous, Continuous, Ramin Phillip DO, Last Rate: 125 mL/hr at 01/12/23 0139, 125 mL/hr at 01/12/23 0139  •  levothyroxine (SYNTHROID, LEVOTHROID) tablet 125 mcg, 125 mcg, Oral, Q AM, Brittani Lott, APRN, 125 mcg at 01/12/23 0549  •  ondansetron (ZOFRAN) injection 4 mg, 4 mg, Intravenous, Q6H PRN, Tanisha Diaz, DO, 4 mg at 01/12/23 0403  •  oxyCODONE (ROXICODONE) immediate release tablet 5 mg, 5 mg, Oral, Q4H PRN, Ramin Phillip, DO, 5 mg at 01/12/23 0138  •  potassium chloride (K-DUR,KLOR-CON) CR tablet 10 mEq,  "10 mEq, Oral, Daily, TingleBrittani L, APRN  •  sodium chloride 0.9 % flush 10 mL, 10 mL, Intravenous, PRN, TinglPhilippe louisei L, APRN  •  topiramate (TOPAMAX) tablet 100 mg, 100 mg, Oral, Q12H, Tingle, Brittani L, APRN, 100 mg at 01/11/23 2122        Review of Systems   Constitutional: Negative.  Negative for fever.   HENT: Negative.    Eyes: Negative.    Respiratory: Negative.    Cardiovascular: Negative.    Gastrointestinal: Negative.    Endocrine: Negative.    Genitourinary: Positive for pelvic pain. Negative for difficulty urinating, dyspareunia and dysuria.   Musculoskeletal: Negative.    Skin: Negative.    Allergic/Immunologic: Negative.    Neurological: Negative.    Hematological: Negative.    Psychiatric/Behavioral: Negative.        Vital Signs  /54 (BP Location: Left arm, Patient Position: Lying)   Pulse (!) 48   Temp 97.6 °F (36.4 °C) (Oral)   Resp 18   Ht 175.3 cm (69\")   Wt 83.9 kg (185 lb)   SpO2 98%   BMI 27.32 kg/m²     Physical Exam  Vitals and nursing note reviewed.   Constitutional:       Appearance: She is well-developed.   HENT:      Head: Normocephalic and atraumatic.   Cardiovascular:      Rate and Rhythm: Normal rate.   Pulmonary:      Effort: Pulmonary effort is normal.   Abdominal:      General: There is no distension.      Palpations: Abdomen is soft. There is no mass.      Tenderness: There is no abdominal tenderness. There is no guarding.   Musculoskeletal:         General: No tenderness or deformity. Normal range of motion.      Cervical back: Normal range of motion.   Skin:     General: Skin is warm and dry.      Coloration: Skin is not pale.      Findings: No erythema or rash.   Neurological:      Mental Status: She is alert and oriented to person, place, and time.   Psychiatric:         Behavior: Behavior normal.         Thought Content: Thought content normal.         Judgment: Judgment normal.             IMPRESSION:    Pelvic pain with L ovarian cyst. Hx hysterectomy, hx " endometriosis                                    PLAN:    Procedure(s):  DIAGNOSTIC LAPAROSCOPY, POSSIBLE BILATERAL SALPINGOOPHORECTOMY, POSSIBLE EXPLORATORY LAPAROTOMY    RISKS, ALTERNATIVES, COMPLICATIONS OF THE PROCEDURE INCLUDING BUT NOT LIMITED TO:    INTRAOPERATIVE RISKS: INJURY TO INTERNAL AND ADJACENT ORGANS AND STRUCTURES (BOWEL, BLADDER, URETER,BLOOD VESSELS) OR HEMORRHAGE REQUIRING FURTHER SURGERY (LAPAROTOMY),  POSSIBLE NON-DIAGNOSTIC FINDINGS, DISCOVERY OF POSSIBLE MALIGNANCY, INFECTION, AND DEATH;   POSTOP COMPLICATIONS: BLEEDING, INFECTION (REQUIRING POSSIBLE REOPERATION), FAILURE OF GOAL OF SURGERY AND RECURRENCE OF ORIGINAL SYMPTOMS, PNEUMONIA, PULMONARY EMBOLISM, AND DEATH;  WERE EXPLAINED TO THE PT WHO VERBALIZED HER UNDERSTANDING.             I discussed the patients findings and my recommendations with patient and family.     John Pro MD  01/12/23  08:18 EST    Electronically signed by John Pro MD at 01/12/23 0819         Lab Results (last 24 hours)     Procedure Component Value Units Date/Time    Tissue Pathology Exam [[id number]] Collected: 01/12/23 1059    Specimen: Tissue from Ovary, Left Updated: 01/12/23 1224    Comprehensive Metabolic Panel [[id number]]  (Abnormal) Collected: 01/12/23 0747    Specimen: Blood Updated: 01/12/23 0817     Glucose 104 mg/dL      BUN 2 mg/dL      Creatinine 0.96 mg/dL      Sodium 137 mmol/L      Potassium 3.4 mmol/L      Chloride 105 mmol/L      CO2 17.9 mmol/L      Calcium 8.9 mg/dL      Total Protein 6.9 g/dL      Albumin 3.8 g/dL      ALT (SGPT) 36 U/L      AST (SGOT) 48 U/L      Alkaline Phosphatase 120 U/L      Total Bilirubin 0.7 mg/dL      Globulin 3.1 gm/dL      A/G Ratio 1.2 g/dL      BUN/Creatinine Ratio 2.1     Anion Gap 14.1 mmol/L      eGFR 75.9 mL/min/1.73      Comment: National Kidney Foundation and American Society of Nephrology (ASN) Task Force recommended calculation based on the Chronic Kidney Disease  Epidemiology Collaboration (CKD-EPI) equation refit without adjustment for race.       Narrative:      GFR Normal >60  Chronic Kidney Disease <60  Kidney Failure <15      CBC (No Diff) [[id number]]  (Normal) Collected: 01/12/23 0747    Specimen: Blood Updated: 01/12/23 0758     WBC 9.21 10*3/mm3      RBC 4.81 10*6/mm3      Hemoglobin 13.6 g/dL      Hematocrit 41.9 %      MCV 87.1 fL      MCH 28.3 pg      MCHC 32.5 g/dL      RDW 12.8 %      RDW-SD 41.1 fl      MPV 10.5 fL      Platelets 308 10*3/mm3     CBC (No Diff) [[id number]]  (Normal) Collected: 01/11/23 1712    Specimen: Blood Updated: 01/11/23 1722     WBC 6.72 10*3/mm3      RBC 4.94 10*6/mm3      Hemoglobin 14.2 g/dL      Hematocrit 43.2 %      MCV 87.4 fL      MCH 28.7 pg      MCHC 32.9 g/dL      RDW 13.1 %      RDW-SD 40.7 fl      MPV 10.7 fL      Platelets 264 10*3/mm3         Imaging Results (Last 24 Hours)     ** No results found for the last 24 hours. **        ECG/EMG Results (last 24 hours)     ** No results found for the last 24 hours. **           Operative/Procedure Notes (last 24 hours)      John Pro MD at 01/12/23 1046        OPERATIVE REPORT    PROCEDURE:  DIAGNOSTIC LAPAROSCOPY, LEFT OOPHORECTOMY    PREOP DX:  Severe LLQ pain, history of hysterectomy, history of endometriosis    POSTOP DX:  same    SURGEON:  John Pro MD    ASSISTANT:  Assistant: Felipe Conti CSA was responsible for performing the following activities: Retraction, Suction, Irrigation, Suturing, Closing, Placing Dressing and Held/Positioned Camera and their skilled assistance was necessary for the success of this case.    ANESTHESIA:  GETA    FINDINGS:  Enlarged left ovary, normal R ovary.    COMPLICATIONS:  none    EBL:  150cc    IVFS:  600cc    URINE OUTPUT:  150cc    SPECIMENS: left ovary    ANTIBIOTICS:  3 g kefzol          DESCRIPTION OF THE PROCEDURE:    After GETA had been induced and time out done, pt was placed in the  dorso-lithotomy position and prepped and draped.  No antibiotics were given.    A sponge stick was placed in the vagina.      A LUQ 5mm port was placed without incident and the abdomen was insufflated with CO2.  Accessory ports were placed:  5mm in the infraumbilicus, 8mm in the RLQ, and 10mm in the LLQ.  These entry sites were all infiltrated with 1/4 % marcaine without before the incision was made.    Pelvic and abdominal cavities were seen in their entirety.  There were no abnormalities.      The left ovary was seen and was brought up out of the pelvis.  It was attached to the pelvic sidewall by the infundibulopelvic ligament.  It was elevated out of the pelvis, far from the pelvic sidewall and detached with the enseal.    The right ovary was seen and was small and normal and left in place.    An endobag was used to contain the ovary and it was brought up to the fascial opening.  It was partially drained.  In order to remove the endobag intact, the assistant extended the fascial opening of the LLQ port site and the endobag was removed intact.      It was noted that there was significant bleeding from this extension, so the skin incision was opened further for exposure and eventually the fascial extension was closed with 0-vicryl in a running interlocking stitch till hemostatic.     The peritoneal cavity was reinspected.  The L infundibulopelvic pedicle was inspected and found to be hemostatic.  Any blood was irrigated till clear.  The peritoneal opening of the 10mm trochar and found to be hemostatic, even under decreased insufflation pressure.      There was no active bleeding at the termination of the procedure.     All instruments were removed and the incisions were reapproximated with 4-0 monocryl in a subcuticular continuous fashion.      Pt tolerated procedure well and went to the RR in satis condition.    All sponge, instrument and needle counts were correct x 3 according to the OR personnel.    John  Dhruv Pro MD  11:49 EST  01/12/23          Electronically signed by John Pro MD at 01/12/23 1201       Physician Progress Notes (last 24 hours)  Notes from 01/11/23 1631 through 01/12/23 1631   No notes of this type exist for this encounter.         Consult Notes (last 24 hours)  Notes from 01/11/23 1631 through 01/12/23 1631   No notes of this type exist for this encounter.

## 2023-01-13 VITALS
HEART RATE: 55 BPM | TEMPERATURE: 98.9 F | OXYGEN SATURATION: 98 % | SYSTOLIC BLOOD PRESSURE: 101 MMHG | WEIGHT: 185 LBS | RESPIRATION RATE: 18 BRPM | BODY MASS INDEX: 27.4 KG/M2 | HEIGHT: 69 IN | DIASTOLIC BLOOD PRESSURE: 59 MMHG

## 2023-01-13 PROBLEM — N83.202 LEFT OVARIAN CYST: Status: ACTIVE | Noted: 2023-01-13

## 2023-01-13 PROBLEM — Z90.721 S/P LEFT OOPHORECTOMY: Status: ACTIVE | Noted: 2023-01-13

## 2023-01-13 LAB
BASOPHILS # BLD AUTO: 0.03 10*3/MM3 (ref 0–0.2)
BASOPHILS NFR BLD AUTO: 0.2 % (ref 0–1.5)
DEPRECATED RDW RBC AUTO: 42.1 FL (ref 37–54)
EOSINOPHIL # BLD AUTO: 0.01 10*3/MM3 (ref 0–0.4)
EOSINOPHIL NFR BLD AUTO: 0.1 % (ref 0.3–6.2)
ERYTHROCYTE [DISTWIDTH] IN BLOOD BY AUTOMATED COUNT: 12.9 % (ref 12.3–15.4)
HCT VFR BLD AUTO: 40.9 % (ref 34–46.6)
HGB BLD-MCNC: 13.2 G/DL (ref 12–15.9)
IMM GRANULOCYTES # BLD AUTO: 0.09 10*3/MM3 (ref 0–0.05)
IMM GRANULOCYTES NFR BLD AUTO: 0.6 % (ref 0–0.5)
LAB AP CASE REPORT: NORMAL
LYMPHOCYTES # BLD AUTO: 2.21 10*3/MM3 (ref 0.7–3.1)
LYMPHOCYTES NFR BLD AUTO: 14.3 % (ref 19.6–45.3)
MCH RBC QN AUTO: 29 PG (ref 26.6–33)
MCHC RBC AUTO-ENTMCNC: 32.3 G/DL (ref 31.5–35.7)
MCV RBC AUTO: 89.9 FL (ref 79–97)
MONOCYTES # BLD AUTO: 0.79 10*3/MM3 (ref 0.1–0.9)
MONOCYTES NFR BLD AUTO: 5.1 % (ref 5–12)
NEUTROPHILS NFR BLD AUTO: 12.33 10*3/MM3 (ref 1.7–7)
NEUTROPHILS NFR BLD AUTO: 79.7 % (ref 42.7–76)
NRBC BLD AUTO-RTO: 0 /100 WBC (ref 0–0.2)
PATH REPORT.FINAL DX SPEC: NORMAL
PATH REPORT.GROSS SPEC: NORMAL
PLATELET # BLD AUTO: 318 10*3/MM3 (ref 140–450)
PMV BLD AUTO: 10.8 FL (ref 6–12)
RBC # BLD AUTO: 4.55 10*6/MM3 (ref 3.77–5.28)
WBC NRBC COR # BLD: 15.46 10*3/MM3 (ref 3.4–10.8)

## 2023-01-13 PROCEDURE — G0378 HOSPITAL OBSERVATION PER HR: HCPCS

## 2023-01-13 PROCEDURE — A9270 NON-COVERED ITEM OR SERVICE: HCPCS | Performed by: OBSTETRICS & GYNECOLOGY

## 2023-01-13 PROCEDURE — 85025 COMPLETE CBC W/AUTO DIFF WBC: CPT | Performed by: OBSTETRICS & GYNECOLOGY

## 2023-01-13 PROCEDURE — 63710000001 DOCUSATE SODIUM 100 MG CAPSULE: Performed by: OBSTETRICS & GYNECOLOGY

## 2023-01-13 PROCEDURE — 63710000001 SIMETHICONE 80 MG CHEWABLE TABLET: Performed by: OBSTETRICS & GYNECOLOGY

## 2023-01-13 PROCEDURE — 63710000001 LEVOTHYROXINE 50 MCG TABLET: Performed by: OBSTETRICS & GYNECOLOGY

## 2023-01-13 PROCEDURE — 63710000001 TOPIRAMATE PER 25 MG: Performed by: OBSTETRICS & GYNECOLOGY

## 2023-01-13 PROCEDURE — 63710000001 IBUPROFEN 600 MG TABLET: Performed by: OBSTETRICS & GYNECOLOGY

## 2023-01-13 PROCEDURE — 63710000001 POTASSIUM CHLORIDE 20 MEQ TABLET CONTROLLED-RELEASE: Performed by: OBSTETRICS & GYNECOLOGY

## 2023-01-13 PROCEDURE — 63710000001 OXYCODONE-ACETAMINOPHEN 5-325 MG TABLET: Performed by: OBSTETRICS & GYNECOLOGY

## 2023-01-13 PROCEDURE — 63710000001 POLYETHYLENE GLYCOL 17 G PACK: Performed by: OBSTETRICS & GYNECOLOGY

## 2023-01-13 RX ORDER — IBUPROFEN 600 MG/1
600 TABLET ORAL EVERY 6 HOURS SCHEDULED
Qty: 30 TABLET | Refills: 0 | Status: SHIPPED | OUTPATIENT
Start: 2023-01-13

## 2023-01-13 RX ORDER — OXYCODONE HYDROCHLORIDE 5 MG/1
5 TABLET ORAL EVERY 4 HOURS PRN
Qty: 10 TABLET | Refills: 0 | Status: SHIPPED | OUTPATIENT
Start: 2023-01-13 | End: 2023-01-18

## 2023-01-13 RX ADMIN — DOCUSATE SODIUM 100 MG: 100 CAPSULE ORAL at 09:21

## 2023-01-13 RX ADMIN — POTASSIUM CHLORIDE 10 MEQ: 1500 TABLET, EXTENDED RELEASE ORAL at 09:21

## 2023-01-13 RX ADMIN — IBUPROFEN 600 MG: 600 TABLET ORAL at 06:04

## 2023-01-13 RX ADMIN — SIMETHICONE 80 MG: 80 TABLET, CHEWABLE ORAL at 07:57

## 2023-01-13 RX ADMIN — IBUPROFEN 600 MG: 600 TABLET ORAL at 11:55

## 2023-01-13 RX ADMIN — POLYETHYLENE GLYCOL 3350 17 G: 17 POWDER, FOR SOLUTION ORAL at 07:57

## 2023-01-13 RX ADMIN — OXYCODONE HYDROCHLORIDE AND ACETAMINOPHEN 2 TABLET: 5; 325 TABLET ORAL at 10:27

## 2023-01-13 RX ADMIN — IBUPROFEN 600 MG: 600 TABLET ORAL at 00:10

## 2023-01-13 RX ADMIN — TOPIRAMATE 100 MG: 25 TABLET, FILM COATED ORAL at 09:21

## 2023-01-13 RX ADMIN — LEVOTHYROXINE SODIUM 125 MCG: 50 TABLET ORAL at 06:05

## 2023-01-13 RX ADMIN — OXYCODONE HYDROCHLORIDE AND ACETAMINOPHEN 2 TABLET: 5; 325 TABLET ORAL at 06:04

## 2023-01-13 RX ADMIN — CLOTRIMAZOLE AND BETAMETHASONE DIPROPIONATE 1 APPLICATION: 10; .5 CREAM TOPICAL at 09:00

## 2023-01-13 RX ADMIN — FAMOTIDINE 20 MG: 10 INJECTION INTRAVENOUS at 09:21

## 2023-01-13 NOTE — CASE MANAGEMENT/SOCIAL WORK
Case Management Discharge Note      Final Note: dc home         Selected Continued Care - Discharged on 1/13/2023 Admission date: 1/11/2023 - Discharge disposition: Home or Self Care    Destination    No services have been selected for the patient.              Durable Medical Equipment    No services have been selected for the patient.              Dialysis/Infusion    No services have been selected for the patient.              Home Medical Care    No services have been selected for the patient.              Therapy    No services have been selected for the patient.              Community Resources    No services have been selected for the patient.              Community & DME    No services have been selected for the patient.                       Final Discharge Disposition Code: 01 - home or self-care

## 2023-01-13 NOTE — DISCHARGE SUMMARY
Date of Discharge:  1/13/2023    Discharge Diagnosis:   S/p lap L oophorectomy, GETA    Problem List:    S/P left oophorectomy    Family history of colon cancer    Chronic idiopathic constipation    HUMPHREY (nonalcoholic steatohepatitis)    hx: Rectal bleeding    Ovarian cyst    Pelvic pain    H/O abdominal hysterectomy    History of endometriosis    History of appendectomy    Encounter for cholecystectomy    Left ovarian cyst      Presenting Problem/History of Present Illness  Ovarian cyst [N83.209]    HPI    ROS:   Review of Systems      Procedures Performed  Procedure(s):  DIAGNOSTIC LAPAROSCOPY, left oophorectomy     This patient has no babies on file.    Consults:   Consults     No orders found from 12/13/2022 to 1/12/2023.          Pertinent Test Results:   Lab Results (last 24 hours)     Procedure Component Value Units Date/Time    CBC & Differential [[id number]]  (Abnormal) Collected: 01/13/23 0612    Specimen: Blood Updated: 01/13/23 0640    Narrative:      The following orders were created for panel order CBC & Differential.  Procedure                               Abnormality         Status                     ---------                               -----------         ------                     CBC Auto Differential[[id number]]        Abnormal            Final result                 Please view results for these tests on the individual orders.    CBC Auto Differential [[id number]]  (Abnormal) Collected: 01/13/23 0612    Specimen: Blood Updated: 01/13/23 0640     WBC 15.46 10*3/mm3      RBC 4.55 10*6/mm3      Hemoglobin 13.2 g/dL      Hematocrit 40.9 %      MCV 89.9 fL      MCH 29.0 pg      MCHC 32.3 g/dL      RDW 12.9 %      RDW-SD 42.1 fl      MPV 10.8 fL      Platelets 318 10*3/mm3      Neutrophil % 79.7 %      Lymphocyte % 14.3 %      Monocyte % 5.1 %      Eosinophil % 0.1 %      Basophil % 0.2 %      Immature Grans % 0.6 %      Neutrophils, Absolute 12.33 10*3/mm3      Lymphocytes, Absolute 2.21 10*3/mm3       Monocytes, Absolute 0.79 10*3/mm3      Eosinophils, Absolute 0.01 10*3/mm3      Basophils, Absolute 0.03 10*3/mm3      Immature Grans, Absolute 0.09 10*3/mm3      nRBC 0.0 /100 WBC     Tissue Pathology Exam [[id number]] Collected: 01/12/23 1059    Specimen: Tissue from Ovary, Left Updated: 01/12/23 1224          Condition on Discharge:  satsi    Vital Signs  Temp:  [97.9 °F (36.6 °C)-98.9 °F (37.2 °C)] 98.9 °F (37.2 °C)  Heart Rate:  [40-68] 55  Resp:  [10-18] 18  BP: ()/(38-68) 101/59    Physical Exam:  Physical Exam      MEDICAL DECISION MAKING:     Discharge Disposition  Home or Self Care    Discharge Medications     Discharge Medications      New Medications      Instructions Start Date   ibuprofen 600 MG tablet  Commonly known as: ADVIL,MOTRIN   600 mg, Oral, Every 6 Hours Scheduled      oxyCODONE 5 MG immediate release tablet  Commonly known as: ROXICODONE   5 mg, Oral, Every 4 Hours PRN         Continue These Medications      Instructions Start Date   ALPRAZolam 0.25 MG tablet  Commonly known as: XANAX   No dose, route, or frequency recorded.      amLODIPine 2.5 MG tablet  Commonly known as: NORVASC   2.5 mg, Oral, Daily      Humira 40 MG/0.8ML Prefilled Syringe Kit injection  Generic drug: adalimumab   No dose, route, or frequency recorded.      hydrOXYzine 50 MG tablet  Commonly known as: ATARAX   25 mg, Oral, 2 Times Daily, 1 in am, 2 at night      levothyroxine 125 MCG tablet  Commonly known as: SYNTHROID, LEVOTHROID   125 mcg, Oral, Every Early Morning      levothyroxine 50 MCG tablet  Commonly known as: SYNTHROID, LEVOTHROID   No dose, route, or frequency recorded.      linaclotide 145 MCG capsule capsule  Commonly known as: LINZESS   145 mcg, Oral, Every Morning Before Breakfast      LITHIUM PO   450 mg, Oral, 2 Times Daily, 1 in am and 2 at bedtime      omeprazole 20 MG capsule  Commonly known as: priLOSEC   20 mg, Oral, Daily      ondansetron ODT 4 MG disintegrating tablet  Commonly known  as: ZOFRAN-ODT   No dose, route, or frequency recorded.      potassium chloride 10 MEQ CR capsule  Commonly known as: MICRO-K   No dose, route, or frequency recorded.      topiramate 100 MG tablet  Commonly known as: TOPAMAX   100 mg, Oral, 2 Times Daily             Discharge Diet   Diet Instructions     Diet: Regular      Discharge Diet: Regular          Activity at Discharge  Activity Instructions     Activity as Tolerated      Pelvic Rest            Follow-up Appointments  Future Appointments   Date Time Provider Department Center   1/23/2023 10:15 AM Mimi Sahu DO [facility]     Additional Instructions for the Follow-ups that You Need to Schedule     Call MD With Problems / Concerns   As directed      Instructions: Instructions after dischage:    No driving for 2 weeks, call for temp>101, heavy vaginal bleeding, increasing lower abdominal pain, not passing gas any more or signs of incisional infection . . No lifting over 10lbs (gallon of milk). Bath or shower is fine.  Any problems call the office 24/7 @ [phone].  Walk as much as possible.    Order Comments: Instructions: Instructions after dischage:  No driving for 2 weeks, call for temp>101, heavy vaginal bleeding, increasing lower abdominal pain, not passing gas any more or signs of incisional infection . . No lifting over 10lbs (gallon of milk). Bath or shower is fine.  Any problems call the office 24/7 @ [phone].  Walk as much as possible.          Discharge Follow-up with Specified Provider: dr brand; 2 Weeks   As directed      To: dr brand    Follow Up: 2 Weeks    Follow Up Details: postop check                     John Brand MD  12:02 EST  1/13/2023

## 2023-01-13 NOTE — PLAN OF CARE
Goal Outcome Evaluation:      VSS, pain well comtrolled with PO meds. Lap sitesX4 covered with bandaids, no redness, swelling, warmth noted. Pt eager to go home.  Problem: Adult Inpatient Plan of Care  Goal: Plan of Care Review  Outcome: Met  Goal: Patient-Specific Goal (Individualized)  Outcome: Met  Goal: Absence of Hospital-Acquired Illness or Injury  Outcome: Met  Intervention: Identify and Manage Fall Risk  Recent Flowsheet Documentation  Taken 1/13/2023 1100 by Brunilda Jama RN  Safety Promotion/Fall Prevention: safety round/check completed  Taken 1/13/2023 1027 by Brunilda Jama RN  Safety Promotion/Fall Prevention: safety round/check completed  Taken 1/13/2023 0800 by Brunilda Jama RN  Safety Promotion/Fall Prevention: safety round/check completed  Intervention: Prevent Skin Injury  Recent Flowsheet Documentation  Taken 1/13/2023 0800 by Brunilda Jama RN  Body Position: supine  Intervention: Prevent and Manage VTE (Venous Thromboembolism) Risk  Recent Flowsheet Documentation  Taken 1/13/2023 0800 by Brunilda Jama RN  Activity Management:   activity adjusted per tolerance   activity encouraged  Goal: Optimal Comfort and Wellbeing  Outcome: Met  Intervention: Monitor Pain and Promote Comfort  Recent Flowsheet Documentation  Taken 1/13/2023 0800 by Brunilda Jama RN  Pain Management Interventions:   pain management plan reviewed with patient/caregiver   see MAR  Intervention: Provide Person-Centered Care  Recent Flowsheet Documentation  Taken 1/13/2023 0800 by Brunilda Jama RN  Trust Relationship/Rapport:   care explained   thoughts/feelings acknowledged   questions answered  Goal: Readiness for Transition of Care  Outcome: Met     Problem: Pain Acute  Goal: Acceptable Pain Control and Functional Ability  Outcome: Met  Intervention: Prevent or Manage Pain  Recent Flowsheet Documentation  Taken 1/13/2023 0800 by Brunilda Jama RN  Medication Review/Management: medications  reviewed  Intervention: Develop Pain Management Plan  Recent Flowsheet Documentation  Taken 1/13/2023 0800 by Brunilda Jama RN  Pain Management Interventions:   pain management plan reviewed with patient/caregiver   see MAR  Intervention: Optimize Psychosocial Wellbeing  Recent Flowsheet Documentation  Taken 1/13/2023 0800 by Brunilda Jama RN  Diversional Activities:   television   smartphone     Problem: Bleeding (Surgery Nonspecified)  Goal: Absence of Bleeding  Outcome: Met     Problem: Bowel Motility Impaired (Surgery Nonspecified)  Goal: Effective Bowel Elimination  Outcome: Met     Problem: Fluid and Electrolyte Imbalance (Surgery Nonspecified)  Goal: Fluid and Electrolyte Balance  Outcome: Met     Problem: Glycemic Control Impaired (Surgery Nonspecified)  Goal: Blood Glucose Level Within Targeted Range  Outcome: Met     Problem: Infection (Surgery Nonspecified)  Goal: Absence of Infection Signs and Symptoms  Outcome: Met     Problem: Ongoing Anesthesia Effects (Surgery Nonspecified)  Goal: Anesthesia/Sedation Recovery  Outcome: Met  Intervention: Optimize Anesthesia Recovery  Recent Flowsheet Documentation  Taken 1/13/2023 1100 by Brunilda Jama RN  Safety Promotion/Fall Prevention: safety round/check completed  Taken 1/13/2023 1027 by Brunilda Jama RN  Safety Promotion/Fall Prevention: safety round/check completed  Taken 1/13/2023 0800 by Brunilda Jama RN  Safety Promotion/Fall Prevention: safety round/check completed     Problem: Pain (Surgery Nonspecified)  Goal: Acceptable Pain Control  Outcome: Met  Intervention: Prevent or Manage Pain  Recent Flowsheet Documentation  Taken 1/13/2023 0800 by Brunilda Jama RN  Pain Management Interventions:   pain management plan reviewed with patient/caregiver   see MAR  Diversional Activities:   television   smartphone     Problem: Postoperative Nausea and Vomiting (Surgery Nonspecified)  Goal: Nausea and Vomiting Relief  Outcome: Met     Problem:  Postoperative Urinary Retention (Surgery Nonspecified)  Goal: Effective Urinary Elimination  Outcome: Met     Problem: Respiratory Compromise (Surgery Nonspecified)  Goal: Effective Oxygenation and Ventilation  Outcome: Met  Intervention: Optimize Oxygenation and Ventilation  Recent Flowsheet Documentation  Taken 1/13/2023 0800 by Brunilda Jama RN  Head of Bed (HOB) Positioning: HOB at 30 degrees

## 2023-01-13 NOTE — CASE MANAGEMENT/SOCIAL WORK
Continued Stay Note  ALICE Putanm     Patient Name: Aruna Hammonds  MRN: [id number]  Today's Date: 1/13/2023    Admit Date: 1/11/2023    Plan: plan home with spouse   Discharge Plan     Row Name 01/13/23 1238       Plan    Plan plan home with spouse    Patient/Family in Agreement with Plan yes    Plan Comments Spoke with patient at bedside, she is sitting up in chair. Permission to speak with wife present. Face sheet verified. PAK explained, signed and copy declined.Patient lives in a home with her spouse and is independent of ADLs including driving. She denies use of DME/HH/Rehab. She does not  have a living will and is not interested in creating one. She sees Dr Gonzalez as PCP. She uses [facility] [place] and denies issues obtaining medications. She plans to return home with her spouse to assist as needed and denies any additional needs for dc. CM will continue to follow.               Discharge Codes    No documentation.               Expected Discharge Date and Time     Expected Discharge Date Expected Discharge Time    Jan 13, 2023             Randy Smart RN

## 2023-01-13 NOTE — PROGRESS NOTES
Patient: Aruna Hammodns  Procedure(s):  DIAGNOSTIC LAPAROSCOPY, left oophorectomy  Anesthesia type: general    Patient location: Labor and Delivery  Last vitals:   Vitals:    01/13/23 0846   BP: 101/59   Pulse: 55   Resp: 18   Temp: 98.9 °F (37.2 °C)   SpO2: 98%     Level of consciousness: awake, alert and oriented    Post-anesthesia pain: adequate analgesia  Airway patency: patent  Respiratory: unassisted  Cardiovascular: stable and blood pressure at baseline  Hydration: euvolemic    Anesthetic complications: no

## 2023-01-13 NOTE — SIGNIFICANT NOTE
Discharge teaching provided to patient and spouse. All questions answered, pt verbalized understanding. Written copy of instructions provided. Pt ambulated off unit in stable condition @1250 accompanied by spouse.

## 2023-01-13 NOTE — PLAN OF CARE
Problem: Adult Inpatient Plan of Care  Goal: Plan of Care Review  Outcome: Ongoing, Progressing  Flowsheets (Taken 1/13/2023 0410)  Progress: improving  Plan of Care Reviewed With: patient  Outcome Evaluation: vss although HR and BP low possibly due to recent pain meds and had just awoken from sleep.  Pain well controlled with scheduled and prn meds.  UAL and ambulated in hallway x3 loops. Taking PO fluids well and voiding adequately. Pt anxious to go home today.  Goal: Patient-Specific Goal (Individualized)  Outcome: Ongoing, Progressing  Flowsheets (Taken 1/13/2023 0410)  Patient-Specific Goals (Include Timeframe): pt hopes for discharge today  Goal: Absence of Hospital-Acquired Illness or Injury  Outcome: Ongoing, Progressing  Intervention: Identify and Manage Fall Risk  Description: Perform standard risk assessment on admission using a validated tool or comprehensive approach appropriate to the patient; reassess fall risk frequently, with change in status or transfer to another level of care.  Communicate fall injury risk to interprofessional healthcare team.  Determine need for increased observation, equipment and environmental modification, such as low bed, signage and supportive, nonskid footwear.  Adjust safety measures to individual developmental age, stage and identified risk factors.  Reinforce the importance of safety and physical activity with patient and family.  Perform regular intentional rounding to assess need for position change, pain assessment and personal needs, including assistance with toileting.  Recent Flowsheet Documentation  Taken 1/12/2023 2015 by Rosalia Adams, RN  Safety Promotion/Fall Prevention:   safety round/check completed   room organization consistent   assistive device/personal items within reach   clutter free environment maintained   lighting adjusted  Intervention: Prevent and Manage VTE (Venous Thromboembolism) Risk  Description: Assess for VTE (venous  thromboembolism) risk.  Encourage and assist with early ambulation.  Initiate and maintain compression or other therapy, as indicated, based on identified risk in accordance with organizational protocol and provider order.  Encourage both active and passive leg exercises while in bed, if unable to ambulate.  Recent Flowsheet Documentation  Taken 1/12/2023 2228 by Rosalia Adams RN  Activity Management: ambulated outside room  Taken 1/12/2023 2030 by Rosalia Adams RN  VTE Prevention/Management:   sequential compression devices off   bilateral  Taken 1/12/2023 2015 by Rosalia Adams RN  Activity Management: bedrest  VTE Prevention/Management: bilateral  Intervention: Prevent Infection  Description: Maintain skin and mucous membrane integrity; promote hand, oral and pulmonary hygiene.  Optimize fluid balance, nutrition, sleep and glycemic control to maximize infection resistance.  Identify potential sources of infection early to prevent or mitigate progression of infection (e.g., wound, lines, devices).  Evaluate ongoing need for invasive devices; remove promptly when no longer indicated.  Recent Flowsheet Documentation  Taken 1/12/2023 2015 by Rosalia Adams RN  Infection Prevention:   cohorting utilized   environmental surveillance performed   equipment surfaces disinfected   hand hygiene promoted   personal protective equipment utilized   rest/sleep promoted   single patient room provided   visitors restricted/screened  Goal: Optimal Comfort and Wellbeing  Outcome: Ongoing, Progressing  Intervention: Monitor Pain and Promote Comfort  Description: Assess pain level, treatment efficacy and patient response at regular intervals using a consistent pain scale.  Consider the presence and impact of preexisting chronic pain.  Encourage patient and caregiver involvement in pain assessment, interventions and safety measures.  Recent Flowsheet Documentation  Taken 1/13/2023 0010 by Bryan  Rosalia Kitchen RN  Pain Management Interventions:   see MAR   pillow support provided   heat applied  Taken 1/12/2023 2228 by Rosalia Adams RN  Pain Management Interventions: (ambulated in hallway)   heat applied   see MAR  Taken 1/12/2023 2015 by Rosalia Adams RN  Pain Management Interventions: no interventions per patient request  Intervention: Provide Person-Centered Care  Description: Use a family-focused approach to care.  Develop trust and rapport by proactively providing information, encouraging questions, addressing concerns and offering reassurance.  Acknowledge emotional response to hospitalization.  Recognize and utilize personal coping strategies.  Honor spiritual and cultural preferences.  Recent Flowsheet Documentation  Taken 1/12/2023 2015 by Rosalia Adams RN  Trust Relationship/Rapport:   care explained   choices provided   emotional support provided   empathic listening provided   questions answered   questions encouraged   reassurance provided   thoughts/feelings acknowledged  Goal: Readiness for Transition of Care  Outcome: Ongoing, Progressing     Problem: Pain Acute  Goal: Acceptable Pain Control and Functional Ability  Outcome: Ongoing, Progressing  Intervention: Prevent or Manage Pain  Description: Evaluate pain level, effect of treatment and patient response at regular intervals.  Minimize painful stimuli; coordinate care and adjust environment (e.g., light, noise, unnecessary movement); promote sleep/rest.  Match pharmacologic analgesia to severity and type of pain mechanism (e.g., neuropathic, muscle, inflammatory); consider multimodal approach (e.g., nonopioid, opioid, adjuvant).  Provide medication at regular intervals; titrate to patient response; premedicate for painful procedures.  Manage breakthrough pain with additional doses; consider rotation or switching medication.  Monitor for signs of substance tolerance (increased dose to reach desired effect,  decreased effect with same dose).  Manage medication-induced effects, such as constipation, nausea, pruritus, urinary retention, somnolence and dizziness.  Provide multimodal interventions, such as as physical activity, therapeutic exercise, yoga, TENS (transcutaneous electrical nerve stimulation) and manual therapy.  Train in functional activity modifications, such as body mechanics, posture, ergonomics, energy conservation and activity pacing.  Consider addition of complementary or alternative therapy, such as acupuncture, hypnosis or therapeutic touch.  Recent Flowsheet Documentation  Taken 1/12/2023 2015 by Rosalia Adams RN  Medication Review/Management: medications reviewed  Intervention: Develop Pain Management Plan  Description: Acknowledge patient as the expert in pain self-management.  Use a consistent, validated tool for pain assessment; include function and quality of life.  Evaluate risk for opioid use and dependence.  Set pain management goals; determine acceptable level of discomfort to allow for maximal functioning.  Determine [id number] pain management plan, including both pharmacologic and nonpharmacologic measures; integrate management of chronic (persistent) pain.  Identify and integrate past successful treatment measures, if able.  Encourage patient and caregiver involvement in pain assessment, interventions and safety measures.  Re-evaluate plan regularly.  Recent Flowsheet Documentation  Taken 1/13/2023 0010 by Rosalia Adams RN  Pain Management Interventions:   see MAR   pillow support provided   heat applied  Taken 1/12/2023 2228 by Rosalia Adams RN  Pain Management Interventions: (ambulated in hallway)   heat applied   see MAR  Taken 1/12/2023 2015 by Rosalia Adams RN  Pain Management Interventions: no interventions per patient request  Intervention: Optimize Psychosocial Wellbeing  Description: Facilitate patient’s self-control over pain by  providing pain information and allowing choices in treatment.  Consider and address emotional response to pain.  Explore and promote use of coping strategies; address barriers to successful coping.  Evaluate and assist with psychosocial, cultural and spiritual factors impacting pain.  Modify pain perception using techniques, such as distraction, mindfulness, guided imagery, meditation or music.  Assess for risk factors for developing chronic pain, such as depression, fear, pain avoidance and pain catastrophizing.  Consider referral for ongoing coping support, such as education, relaxation training and role of thoughts.  Recent Flowsheet Documentation  Taken 1/12/2023 2015 by Rosalia Adams RN  Supportive Measures:   active listening utilized   decision-making supported   verbalization of feelings encouraged   self-responsibility promoted   self-care encouraged   relaxation techniques promoted   positive reinforcement provided  Diversional Activities:   smartphone   television     Problem: Bleeding (Surgery Nonspecified)  Goal: Absence of Bleeding  Outcome: Ongoing, Progressing     Problem: Bowel Motility Impaired (Surgery Nonspecified)  Goal: Effective Bowel Elimination  Outcome: Ongoing, Progressing     Problem: Fluid and Electrolyte Imbalance (Surgery Nonspecified)  Goal: Fluid and Electrolyte Balance  Outcome: Ongoing, Progressing     Problem: Glycemic Control Impaired (Surgery Nonspecified)  Goal: Blood Glucose Level Within Targeted Range  Outcome: Ongoing, Progressing     Problem: Infection (Surgery Nonspecified)  Goal: Absence of Infection Signs and Symptoms  Outcome: Ongoing, Progressing     Problem: Ongoing Anesthesia Effects (Surgery Nonspecified)  Goal: Anesthesia/Sedation Recovery  Outcome: Ongoing, Progressing  Intervention: Optimize Anesthesia Recovery  Description: Assess and monitor airway, breathing and circulation; maintain close surveillance for deterioration.  Implement continuous  monitoring, such as cardiorespiratory, blood pressure, temperature, pulse oximetry and capnography.  Elevate head of bed, if able; facilitate regular position changes.  Assess neurocognitive function and for risks that may lead to postoperative delirium, such as decreased level of consciousness, pain and agitation; offer reassurance; answer questions.  Assess and monitor neurovascular and neuromuscular function, such as motor strength, tone, posture, peripheral pulses and extremity sensation; protect areas of decreased sensation from heat, cold, medical devices or objects.  Individualize frequency and intensity of monitoring based on sedation or anesthesia administered, identified risk factors, ongoing assessment and organizational protocol.  Prepare for administration of pharmacologic therapy, such as reversal agent, antiemetic or antipruritic medication, to manage sedation or anesthesia effects.  Adjust environment to maintain safety (e.g., fall precautions, safety equipment).  Recent Flowsheet Documentation  Taken 1/12/2023 2015 by Rosalia Adasm RN  Safety Promotion/Fall Prevention:   safety round/check completed   room organization consistent   assistive device/personal items within reach   clutter free environment maintained   lighting adjusted     Problem: Pain (Surgery Nonspecified)  Goal: Acceptable Pain Control  Outcome: Ongoing, Progressing  Intervention: Prevent or Manage Pain  Description: Develop mutual pain management plan with patient and caregiver; review regularly.  Consider the presence and impact of preexisting chronic pain.  Encourage patient and caregiver involvement in pain assessment, interventions and safety measures.  Individualize pharmacologic pain management plan; titrate medication to patient response.  Combine multimodal analgesia and nonpharmacologic strategies to help potentiate synergistic effects and enhance comfort (e.g., complementary therapy, diversional  activity).  Offer around-the-clock dosing of pain medication to keep pain levels in control.  Manage medication-induced effects, such as constipation, nausea, vomiting.  Support and optimize psychosocial response to pain.  Recent Flowsheet Documentation  Taken 1/13/2023 0010 by Rosalia Adams, RN  Pain Management Interventions:   see MAR   pillow support provided   heat applied  Taken 1/12/2023 2228 by Rosalia Adams, RN  Pain Management Interventions: (ambulated in hallway)   heat applied   see MAR  Taken 1/12/2023 2015 by Rosalia Adams, RN  Pain Management Interventions: no interventions per patient request  Diversional Activities:   smartphone   television     Problem: Postoperative Nausea and Vomiting (Surgery Nonspecified)  Goal: Nausea and Vomiting Relief  Outcome: Ongoing, Progressing     Problem: Postoperative Urinary Retention (Surgery Nonspecified)  Goal: Effective Urinary Elimination  Outcome: Ongoing, Progressing     Problem: Respiratory Compromise (Surgery Nonspecified)  Goal: Effective Oxygenation and Ventilation  Outcome: Ongoing, Progressing   Goal Outcome Evaluation:  Plan of Care Reviewed With: patient        Progress: improving  Outcome Evaluation: vss although HR and BP low possibly due to recent pain meds and had just awoken from sleep.  Pain well controlled with scheduled and prn meds.  UAL and ambulated in hallway x3 loops. Taking PO fluids well and voiding adequately. Pt anxious to go home today.

## 2023-01-16 ENCOUNTER — TELEPHONE (OUTPATIENT)
Dept: OBSTETRICS AND GYNECOLOGY | Facility: CLINIC | Age: 43
End: 2023-01-16
Payer: MEDICARE

## 2023-01-16 NOTE — TELEPHONE ENCOUNTER
Pt called and is in a lot of pain and can not walk because of the pain.   She is also Nauseated.    She was instructed to go to the ER,

## 2023-01-17 DIAGNOSIS — Z90.721 S/P LEFT OOPHORECTOMY: ICD-10-CM

## 2023-01-17 RX ORDER — IBUPROFEN 600 MG/1
600 TABLET ORAL EVERY 6 HOURS SCHEDULED
Qty: 30 TABLET | Refills: 0 | Status: CANCELLED | OUTPATIENT
Start: 2023-01-17

## 2023-01-17 RX ORDER — OXYCODONE HYDROCHLORIDE 5 MG/1
5 TABLET ORAL EVERY 4 HOURS PRN
Qty: 10 TABLET | Refills: 0 | Status: CANCELLED | OUTPATIENT
Start: 2023-01-17 | End: 2023-01-22

## 2023-01-18 NOTE — TELEPHONE ENCOUNTER
I called to schedule an appointment with her per Dr. Pro and she said she was feeling better and will see you at her post-op visit.

## 2023-01-18 NOTE — TELEPHONE ENCOUNTER
I called to schedule an appointment with her and she said she was feeling better and will see you at her post-op visit.

## 2023-02-01 ENCOUNTER — TELEPHONE (OUTPATIENT)
Dept: OBSTETRICS AND GYNECOLOGY | Facility: CLINIC | Age: 43
End: 2023-02-01

## 2023-02-01 ENCOUNTER — OFFICE VISIT (OUTPATIENT)
Dept: OBSTETRICS AND GYNECOLOGY | Facility: CLINIC | Age: 43
End: 2023-02-01
Payer: MEDICARE

## 2023-02-01 VITALS
HEIGHT: 69 IN | SYSTOLIC BLOOD PRESSURE: 112 MMHG | WEIGHT: 178.2 LBS | BODY MASS INDEX: 26.39 KG/M2 | DIASTOLIC BLOOD PRESSURE: 86 MMHG

## 2023-02-01 DIAGNOSIS — N89.8 VAGINAL ITCHING: Primary | ICD-10-CM

## 2023-02-01 LAB
BILIRUB BLD-MCNC: NEGATIVE MG/DL
CLARITY, POC: CLEAR
COLOR UR: YELLOW
GLUCOSE UR STRIP-MCNC: NEGATIVE MG/DL
KETONES UR QL: NEGATIVE
LEUKOCYTE EST, POC: NEGATIVE
NITRITE UR-MCNC: NEGATIVE MG/ML
PH UR: 5 [PH] (ref 5–8)
PROT UR STRIP-MCNC: NEGATIVE MG/DL
RBC # UR STRIP: NEGATIVE /UL
SP GR UR: 1 (ref 1–1.03)
UROBILINOGEN UR QL: NORMAL

## 2023-02-01 PROCEDURE — 81002 URINALYSIS NONAUTO W/O SCOPE: CPT | Performed by: OBSTETRICS & GYNECOLOGY

## 2023-02-01 PROCEDURE — 99213 OFFICE O/P EST LOW 20 MIN: CPT | Performed by: OBSTETRICS & GYNECOLOGY

## 2023-02-01 RX ORDER — LITHIUM CARBONATE 450 MG
TABLET, EXTENDED RELEASE ORAL
COMMUNITY
Start: 2022-11-16

## 2023-02-01 RX ORDER — NYSTATIN AND TRIAMCINOLONE ACETONIDE 100000; 1 [USP'U]/G; MG/G
1 OINTMENT TOPICAL 2 TIMES DAILY
Qty: 60 G | Refills: 0 | Status: SHIPPED | OUTPATIENT
Start: 2023-02-01 | End: 2023-02-08

## 2023-02-01 NOTE — PROGRESS NOTES
"POSTOP PROBLEM    S:  Pt here for postop check but is still having postop pain on L and is now having severe painful vaginitis and vulvitis that started about a week ago.  No bleeding. No fever. No UTI symptoms.  Discomfort is unresponsive to monistat.    O:  /86   Ht 175.3 cm (69.02\")   Wt 80.8 kg (178 lb 3.2 oz)   BMI 26.30 kg/m²     Brief Urine Lab Results  (Last result in the past 365 days)      Color   Clarity   Blood   Leuk Est   Nitrite   Protein   CREAT   Urine HCG        02/01/23 1351 Yellow   Clear   Negative   Negative   Negative   Negative                 Exam:  incs ok    Vulva and vagina with introitus, red, irritated; consistent with severe dermatitis.  Vagina has remnants of monistat present.     A:  Postop vaginitis and vulvitis.    P:  mycolog cream.  rto for recheck 2 weeks.     I spent 20+ minutes caring for Aruna on this date of service. This time includes time spent by me in the following activities: preparing for the visit, reviewing tests, obtaining and/or reviewing a separately obtained history, performing a medically appropriate examination and/or evaluation, counseling and educating the patient/family/caregiver, ordering medications, tests, or procedures, referring and communicating with other health care professionals, documenting information in the medical record, independently interpreting results and communicating that information with the patient/family/caregiver and care coordination    John Pro MD  14:14 EST  02/01/23      "

## 2023-02-01 NOTE — TELEPHONE ENCOUNTER
Patient calling states the mycolog cream you gave to her is not covered by insurance, but they recommend clotrim/beta cream.

## 2023-02-06 PROBLEM — A49.3 MYCOPLASMA INFECTION: Status: ACTIVE | Noted: 2023-02-06

## 2023-02-06 LAB
A VAGINAE DNA VAG QL NAA+PROBE: NORMAL SCORE
BVAB2 DNA VAG QL NAA+PROBE: NORMAL SCORE
C ALBICANS DNA VAG QL NAA+PROBE: NEGATIVE
C GLABRATA DNA VAG QL NAA+PROBE: NEGATIVE
C TRACH DNA VAG QL NAA+PROBE: NEGATIVE
M GENITALIUM DNA SPEC QL NAA+PROBE: NEGATIVE
M HOMINIS DNA SPEC QL NAA+PROBE: POSITIVE
MEGA1 DNA VAG QL NAA+PROBE: NORMAL SCORE
N GONORRHOEA DNA VAG QL NAA+PROBE: NEGATIVE
T VAGINALIS DNA VAG QL NAA+PROBE: NEGATIVE
UREAPLASMA DNA SPEC QL NAA+PROBE: NEGATIVE

## 2023-02-06 RX ORDER — DOXYCYCLINE HYCLATE 100 MG
100 TABLET ORAL 2 TIMES DAILY
Qty: 28 TABLET | Refills: 0 | Status: SHIPPED | OUTPATIENT
Start: 2023-02-06 | End: 2023-02-20

## 2023-02-08 PROBLEM — Z76.89 ENCOUNTER FOR CHOLECYSTECTOMY: Status: RESOLVED | Noted: 2023-01-12 | Resolved: 2023-02-08

## 2023-02-13 ENCOUNTER — OFFICE VISIT (OUTPATIENT)
Dept: SURGERY | Facility: CLINIC | Age: 43
End: 2023-02-13
Payer: MEDICARE

## 2023-02-13 ENCOUNTER — TELEPHONE (OUTPATIENT)
Dept: SURGERY | Facility: CLINIC | Age: 43
End: 2023-02-13

## 2023-02-13 VITALS
RESPIRATION RATE: 20 BRPM | DIASTOLIC BLOOD PRESSURE: 78 MMHG | SYSTOLIC BLOOD PRESSURE: 118 MMHG | HEART RATE: 72 BPM | BODY MASS INDEX: 25.77 KG/M2 | HEIGHT: 69 IN | WEIGHT: 174 LBS

## 2023-02-13 DIAGNOSIS — R10.30 LOWER ABDOMINAL PAIN: ICD-10-CM

## 2023-02-13 DIAGNOSIS — R11.0 NAUSEA: Primary | ICD-10-CM

## 2023-02-13 PROCEDURE — 99213 OFFICE O/P EST LOW 20 MIN: CPT | Performed by: SURGERY

## 2023-02-13 RX ORDER — MECLIZINE HYDROCHLORIDE 25 MG/1
25 TABLET ORAL 3 TIMES DAILY PRN
Status: SHIPPED | OUTPATIENT
Start: 2023-02-13

## 2023-02-13 NOTE — TELEPHONE ENCOUNTER
Caller: Aruna Hammonds    Relationship to patient: SELF     Best call back number:[phone]     Patient is needing: WANTED TO KNOW IF PRESCRIPTION WAS SENT TO [address]

## 2023-02-13 NOTE — PROGRESS NOTES
"Aruna Hammonds 42 y.o. female presents as a self ref for eval N/V since Dec.  Pt is tearful and very emotional states she is vomiting about 15 times a day and has abd pain.  Pt reports she has not eat any foods in 4 days and her mouth is very dry.  Reports no support at home.  A lot of stress and states, \"I can't take this anymore.  If I had a gun I would put it to my head.\"  Further states she does think about harming herself at times because the s/s are too much.  Pt reports she drove herself to the ER last night and was given IV Zofran and it helped for 2-3 hours.  Pt admits that she did not complete the 2nd Rx for +H Pylori.   Chief Complaint   Patient presents with   • Nausea   • Vomiting             HPI   Above noted and agree.  Aruna is known to my service.  She says she has been sick for two months.  She has been having issues with lower abdominal pain.  She has nausea and cannot hold anything down.  She moves her bowels without difficulty.  She recently had a diagnostic laparoscopy with left oophorectomy for endometriosis.  She still has her right ovary.  She said IV zofran helps but PO does not.  She does have a history of gastritis.        Review of Systems   All other systems reviewed and are negative.            Current Outpatient Medications:   •  ALPRAZolam (XANAX) 0.25 MG tablet, , Disp: , Rfl:   •  amLODIPine (NORVASC) 2.5 MG tablet, Take 2.5 mg by mouth Daily., Disp: , Rfl:   •  doxycycline (VIBRAMYICN) 100 MG tablet, Take 1 tablet by mouth 2 (Two) Times a Day for 14 days., Disp: 28 tablet, Rfl: 0  •  Humira 40 MG/0.8ML Prefilled Syringe Kit injection, , Disp: , Rfl:   •  hydrOXYzine (ATARAX) 50 MG tablet, Take 25 mg by mouth 2 (Two) Times a Day. 1 in am, 2 at night, Disp: , Rfl:   •  ibuprofen (ADVIL,MOTRIN) 600 MG tablet, Take 1 tablet by mouth Every 6 (Six) Hours., Disp: 30 tablet, Rfl: 0  •  levothyroxine (SYNTHROID, LEVOTHROID) 125 MCG tablet, Take 125 mcg by mouth Every Morning., Disp: " , Rfl:   •  levothyroxine (SYNTHROID, LEVOTHROID) 50 MCG tablet, , Disp: , Rfl:   •  linaclotide (LINZESS) 145 MCG capsule capsule, Take 1 capsule by mouth Every Morning Before Breakfast., Disp: 30 capsule, Rfl: 11  •  lithium (ESKALITH) 450 MG CR tablet, , Disp: , Rfl:   •  LITHIUM PO, Take 450 mg by mouth 2 (Two) Times a Day. 1 in am and 2 at bedtime, Disp: , Rfl:   •  omeprazole (priLOSEC) 20 MG capsule, Take 20 mg by mouth Daily., Disp: , Rfl:   •  potassium chloride (MICRO-K) 10 MEQ CR capsule, , Disp: , Rfl:   •  topiramate (TOPAMAX) 100 MG tablet, Take 100 mg by mouth 2 (Two) Times a Day., Disp: , Rfl:     Current Facility-Administered Medications:   •  meclizine (ANTIVERT) tablet 25 mg, 25 mg, Oral, TID PRN, Mimi Sahu DO        Allergies   Allergen Reactions   • Sulfa Antibiotics Unknown - Low Severity and Shortness Of Breath     As a child           Past Medical History:   Diagnosis Date   • ADHD (attention deficit hyperactivity disorder)    • Bipolar 1 disorder (HCC)    • Depression    • Hypertension    • Hypothyroid    • Migraines    • Psychosis (HCC)    • PTSD (post-traumatic stress disorder)            Past Surgical History:   Procedure Laterality Date   • APPENDECTOMY     • CHOLECYSTECTOMY     • CHOLECYSTECTOMY WITH INTRAOPERATIVE CHOLANGIOGRAM N/A 04/14/2022    Procedure: CHOLECYSTECTOMY LAPAROSCOPIC INTRAOPERATIVE CHOLANGIOGRAM;  Surgeon: Mimi Sahu DO;  Location: [facility] OR;  Service: General;  Laterality: N/A;   • COLONOSCOPY     • COLONOSCOPY W/ POLYPECTOMY N/A 5/24/2022    Procedure: Colonoscopy with polypectomy;  Surgeon: Mimi Sahu DO;  Location: [facility] OR;  Service: Gastroenterology;  Laterality: N/A;  rectal polyp   • DIAGNOSTIC LAPAROSCOPY N/A 1/12/2023    Procedure: DIAGNOSTIC LAPAROSCOPY, left oophorectomy;  Surgeon: John Pro MD;  Location: [facility] OR;  Service: Obstetrics/Gynecology;  Laterality: N/A;   • ENDOSCOPY N/A 5/24/2022     "Procedure: Esophagogastroduodenoscopy with biopsy;  Surgeon: Mimi Sahu DO;  Location: [facility] OR;  Service: Gastroenterology;  Laterality: N/A;  gastritis  CAMRYN test   • HYSTERECTOMY             Social History     Tobacco Use   • Smoking status: Every Day     Packs/day: 1.00     Types: Cigarettes     Last attempt to quit: 2022     Years since quittin.9   • Smokeless tobacco: Never   • Tobacco comments:     caff use   Vaping Use   • Vaping Use: Never used   Substance Use Topics   • Alcohol use: No   • Drug use: No           Immunization History   Administered Date(s) Administered   • COVID-19 (ALTHIA) 2021   • FluLaval/Fluzone >6mos 2019           Physical Exam  Constitutional:       Comments: Flat affect   Cardiovascular:      Rate and Rhythm: Normal rate and regular rhythm.   Pulmonary:      Effort: Pulmonary effort is normal.      Breath sounds: Normal breath sounds.   Abdominal:      General: Bowel sounds are normal.      Palpations: Abdomen is soft.      Comments: Several small surgical incisions healing well without signs of infection.  She has lower abdominal tenderness to palpation.  There is no epigastric pain or upper abdominal pain noted.   Musculoskeletal:         General: No swelling or tenderness.   Skin:     General: Skin is warm and dry.   Neurological:      General: No focal deficit present.      Mental Status: She is oriented to person, place, and time.   Psychiatric:      Comments: depressed         Debilities/Disabilities Identified: None    Emotional Behavior: Appropriate      /78   Pulse 72   Resp 20   Ht 175.3 cm (69\")   Wt 78.9 kg (174 lb)   BMI 25.70 kg/m²         Diagnoses and all orders for this visit:    1. Nausea (Primary)  -     meclizine (ANTIVERT) tablet 25 mg    2. Lower abdominal pain    We had a long discussion regarding her threatening to put a gun to her head.  She said she is no suicidal and does not want to kill herself.  She is tired of " feeling sick.  I will order a gastric emptying study and get her back into see Dr. Pro for her endometriosis.      Thank you for allowing me to participate in the care of this interesting patient.

## 2023-02-14 ENCOUNTER — TELEPHONE (OUTPATIENT)
Dept: SURGERY | Facility: CLINIC | Age: 43
End: 2023-02-14
Payer: MEDICARE

## 2023-02-14 NOTE — TELEPHONE ENCOUNTER
Pt has been scheduled for an appt with Dr. Pro on 02/28/23 at 1:00pm    Referral to [facility] has been sent.     Pt is aware of appt and referral.

## 2023-02-15 ENCOUNTER — TELEPHONE (OUTPATIENT)
Dept: OBSTETRICS AND GYNECOLOGY | Facility: CLINIC | Age: 43
End: 2023-02-15

## 2023-02-15 NOTE — TELEPHONE ENCOUNTER
Caller: Aruna Hammonds    Relationship to patient: Self    Best call back number: [phone]    Chief complaint: PATIENT STATED THAT SHE IS IN TERRIBLE PAIN, HAS CRAMPING, AND IS THROWING UP BECAUSE SHE IS IN SO MUCH PAIN//PATIENT STATED THAT SHE WOULD LIKE TO BE SEEN SOONER IF POSSIBLE BECAUSE ESHE SAID THAT SHE DOESN'T THINK SHE COULD WAIT UNTIL 2/28//PATIENT STATED THAT SHE SEEN THE GASTROLOGIST AND WAS ADV THAT THEY COULDN'T HELP HER AND WAS SENDING HER BACK TO DR GIBSON//PATIENT STATED THAT THIS HAS BEEN GOING ON SINCE SURGERY IN DEC//PLEASE FOLLOW UP     Type of visit: GYN FOLLOW UP    Requested date: ASAP    If rescheduling, when is the original appointment: 2/28    Additional notes:PLEASE FOLLOW UP

## 2023-02-16 ENCOUNTER — TELEPHONE (OUTPATIENT)
Dept: SURGERY | Facility: CLINIC | Age: 43
End: 2023-02-16
Payer: MEDICARE

## 2023-02-16 DIAGNOSIS — R11.0 NAUSEA: Primary | ICD-10-CM

## 2023-02-16 NOTE — TELEPHONE ENCOUNTER
PT HAS BEEN SCHEDULED FOR [facility] 03/30/23 11AM [facility]. NO ANSWER OR VOICEMAIL WHEN I CALLED PT. APPT LETTER HAS BEEN MAILED WITH INFO.

## 2023-03-06 ENCOUNTER — TELEPHONE (OUTPATIENT)
Dept: OBSTETRICS AND GYNECOLOGY | Facility: CLINIC | Age: 43
End: 2023-03-06
Payer: MEDICARE

## 2023-03-06 NOTE — TELEPHONE ENCOUNTER
Caller: Aruna Hammonds    Relationship: Self    Best call back number: [phone]    Who is your current provider: DR GIBSON    Who would you like your new provider to be: DR GARCIA    Additional notes: PT STATES SHE HAS ENDOMETRIOSIS AND OVARIAN PAIN AND STATES SHE WOULD RATHER SEE DR GARCIA INSTEAD, PLEASE ADVISE PT.

## 2023-03-09 ENCOUNTER — TELEPHONE (OUTPATIENT)
Dept: OBSTETRICS AND GYNECOLOGY | Facility: CLINIC | Age: 43
End: 2023-03-09

## 2023-03-09 NOTE — TELEPHONE ENCOUNTER
Caller: Aruna Hammonds    Relationship: Self    Best call back number: [phone]-CALL ANYTIME, IT IS OKAY TO LVM.     Who is your current provider:     Who would you like your new provider to be: DR. GARCIA    What are your reasons for transferring care:     Additional notes: PT HAS NOT RECEIVED AN UPDATE FROM MESSAGE SENT 03.06.23. PT IS REQUESTING A CALLBACK.

## 2023-04-10 ENCOUNTER — TELEPHONE (OUTPATIENT)
Dept: OBSTETRICS AND GYNECOLOGY | Facility: CLINIC | Age: 43
End: 2023-04-10

## 2023-04-10 ENCOUNTER — OFFICE VISIT (OUTPATIENT)
Dept: OBSTETRICS AND GYNECOLOGY | Facility: CLINIC | Age: 43
End: 2023-04-10
Payer: MEDICARE

## 2023-04-10 VITALS
HEIGHT: 69 IN | DIASTOLIC BLOOD PRESSURE: 74 MMHG | WEIGHT: 171 LBS | BODY MASS INDEX: 25.33 KG/M2 | SYSTOLIC BLOOD PRESSURE: 124 MMHG

## 2023-04-10 DIAGNOSIS — Z12.31 ENCOUNTER FOR SCREENING MAMMOGRAM FOR MALIGNANT NEOPLASM OF BREAST: ICD-10-CM

## 2023-04-10 DIAGNOSIS — R97.8 OTHER ABNORMAL TUMOR MARKERS: ICD-10-CM

## 2023-04-10 DIAGNOSIS — N80.9 ENDOMETRIOSIS: ICD-10-CM

## 2023-04-10 DIAGNOSIS — D49.59 OVARIAN NEOPLASM: ICD-10-CM

## 2023-04-10 DIAGNOSIS — B37.9 YEAST INFECTION: ICD-10-CM

## 2023-04-10 DIAGNOSIS — N94.2 VAGINISMUS: ICD-10-CM

## 2023-04-10 DIAGNOSIS — N89.8 VAGINAL DISCHARGE: ICD-10-CM

## 2023-04-10 DIAGNOSIS — Z01.818 PREOPERATIVE EXAM FOR GYNECOLOGIC SURGERY: ICD-10-CM

## 2023-04-10 DIAGNOSIS — R10.2 PELVIC PAIN: Primary | ICD-10-CM

## 2023-04-10 RX ORDER — OMEPRAZOLE 40 MG/1
CAPSULE, DELAYED RELEASE ORAL
COMMUNITY
Start: 2023-03-31

## 2023-04-10 RX ORDER — CLINDAMYCIN HYDROCHLORIDE 300 MG/1
CAPSULE ORAL
COMMUNITY
Start: 2023-03-31

## 2023-04-10 RX ORDER — PROMETHAZINE HYDROCHLORIDE 25 MG/1
TABLET ORAL
COMMUNITY
Start: 2023-03-02

## 2023-04-10 RX ORDER — PANTOPRAZOLE SODIUM 20 MG/1
TABLET, DELAYED RELEASE ORAL
COMMUNITY
Start: 2023-02-13

## 2023-04-10 RX ORDER — DICYCLOMINE HYDROCHLORIDE 10 MG/1
CAPSULE ORAL
COMMUNITY
Start: 2023-02-13

## 2023-04-10 RX ORDER — FLUOXETINE HYDROCHLORIDE 40 MG/1
CAPSULE ORAL
COMMUNITY
Start: 2023-03-23

## 2023-04-10 RX ORDER — FLUCONAZOLE 150 MG/1
150 TABLET ORAL ONCE
Qty: 1 TABLET | Refills: 1 | Status: SHIPPED | OUTPATIENT
Start: 2023-04-10 | End: 2023-04-10

## 2023-04-10 RX ORDER — METRONIDAZOLE 500 MG/1
TABLET ORAL
COMMUNITY
Start: 2023-03-31

## 2023-04-10 RX ORDER — CLOTRIMAZOLE AND BETAMETHASONE DIPROPIONATE 10; .64 MG/G; MG/G
CREAM TOPICAL
COMMUNITY
Start: 2023-02-02

## 2023-04-10 NOTE — PROGRESS NOTES
Aruna Hammonds is a 42 y.o. patient who presents for follow up of   Chief Complaint   Patient presents with   • Endometriosis     Ovarion pain,   Vaginal irritation        41 yo est pt of the practice who is new to me presents for discussion of pelvic pain. She has had a hysterectomy in the past and in  1/2023 she had acute LLQ pain and had a lsc LSO that showed a B9 hemorrhagic CL and a serous cystadenoma. Her R ovary was normal at that time. She is now having RLQ pain and her US shows a globally enlarged R ovary that has multiple cystic areas measuring 2 x 1.5cm, 1.1 x 1.0 cm, 1.4 x 1.4 cm, 1.4 x 1.2 cm, 1.8 x 2.3 cm, 1.4 x 2.3 cm. There is no free fluid. We discussed ovarian suppression with progesterone vs lsc RSO and she desires to proceed with RSO. We had a long discussion about surgical menopause. She also has vaginal discharge with pain and stinging.       The following portions of the patient's history were reviewed and updated as appropriate: allergies, current medications and problem list.    Review of Systems   Constitutional: Positive for activity change, appetite change and unexpected weight change. Negative for fatigue and fever.   Eyes: Negative for photophobia and visual disturbance.   Respiratory: Negative for cough and shortness of breath.    Cardiovascular: Negative for chest pain and palpitations.   Gastrointestinal: Positive for abdominal distention, abdominal pain and nausea. Negative for constipation and diarrhea.   Endocrine: Negative for cold intolerance and heat intolerance.   Genitourinary: Positive for dyspareunia, pelvic pain and vaginal pain. Negative for dysuria, menstrual problem and vaginal discharge.   Musculoskeletal: Negative for back pain.   Skin: Negative for color change and rash.   Neurological: Negative for headaches.   Hematological: Negative for adenopathy. Does not bruise/bleed easily.   Psychiatric/Behavioral: Negative for dysphoric mood. The patient is not  "nervous/anxious.        /74   Ht 175.3 cm (69.02\")   Wt 77.6 kg (171 lb)   BMI 25.24 kg/m²     Physical Exam  Vitals and nursing note reviewed. Exam conducted with a chaperone present.   Constitutional:       Appearance: Normal appearance. She is well-developed.   HENT:      Head: Normocephalic and atraumatic.   Eyes:      General: No scleral icterus.     Conjunctiva/sclera: Conjunctivae normal.   Neck:      Thyroid: No thyromegaly.   Cardiovascular:      Rate and Rhythm: Normal rate and regular rhythm.   Pulmonary:      Effort: Pulmonary effort is normal.      Breath sounds: Normal breath sounds.   Abdominal:      General: Bowel sounds are normal. There is no distension.      Palpations: Abdomen is soft. There is no mass.      Tenderness: There is no abdominal tenderness. There is no guarding or rebound.      Hernia: No hernia is present.   Genitourinary:     Labia:         Right: No rash, tenderness, lesion or injury.         Left: No rash, tenderness, lesion or injury.       Urethra: No prolapse, urethral pain, urethral swelling or urethral lesion.      Vagina: Vaginal discharge and tenderness present.      Uterus: Absent.       Adnexa:         Right: Tenderness present.       Comments: Uterus and cervix absent  + LPS  Discharge noted  Musculoskeletal:      Cervical back: Neck supple.   Skin:     General: Skin is warm and dry.   Neurological:      Mental Status: She is alert and oriented to person, place, and time.   Psychiatric:         Behavior: Behavior normal.         Thought Content: Thought content normal.         Judgment: Judgment normal.         A/P:  1. Vaginal yeast- ERX Diflucan 150 mg.   2. Vaginal discharge- check NuSwab Y/M/B/L  3. Pelvic pain and ovarian neoplasm- check  and CEA. Pt desires RSO. Plan dx lsc RSO, possible ex lap. Risks, benefits and alternatives of the procedure were discussed, including , but not limited to: infection, bleeding, transfusion, injury to adjacent " structures, laparotomy, possible nondiagnostic findings, possible findings of unexpected malignancy, reoperation, recurrent symptoms, thromboembolic events, anaeasthetic complications and death. Pre/intra/postop course was reviewed and all questions answered. Patient was encouraged to call for any additional questions she might have in the future.   4. Vaginismus- consider pelvic floor PT    Assessment & Plan   Diagnoses and all orders for this visit:    1. Pelvic pain (Primary)  -     NuSwab VG+ - Swab, Vagina  -     Genital Mycoplasmas SHERRI, Swab - Swab, Vagina  -       -     CEA    2. Endometriosis  -     POC Urinalysis Dipstick  -       -     CEA    3. Encounter for screening mammogram for malignant neoplasm of breast  -     Mammo Screening Digital Tomosynthesis Bilateral With CAD; Future    4. Ovarian neoplasm  -         5. Other abnormal tumor markers  -     CEA    6. Vaginal discharge    7. Preoperative exam for gynecologic surgery    8. Yeast infection    9. Vaginismus    Other orders  -     fluconazole (DIFLUCAN) 150 MG tablet; Take 1 tablet by mouth 1 (One) Time for 1 dose.  Dispense: 1 tablet; Refill: 1                 No follow-ups on file.      Irene Aguilar MD    4/10/2023  20:38 EDT

## 2023-04-11 LAB
CANCER AG125 SERPL-ACNC: 31.2 U/ML (ref 0–38.1)
CEA SERPL-MCNC: 1.6 NG/ML (ref 0–4.7)

## 2023-04-15 LAB
A VAGINAE DNA VAG QL NAA+PROBE: ABNORMAL SCORE
BVAB2 DNA VAG QL NAA+PROBE: ABNORMAL SCORE
C ALBICANS DNA VAG QL NAA+PROBE: POSITIVE
C GLABRATA DNA VAG QL NAA+PROBE: NEGATIVE
C TRACH DNA VAG QL NAA+PROBE: NEGATIVE
M GENITALIUM DNA SPEC QL NAA+PROBE: NEGATIVE
M HOMINIS DNA SPEC QL NAA+PROBE: NEGATIVE
MEGA1 DNA VAG QL NAA+PROBE: ABNORMAL SCORE
N GONORRHOEA DNA VAG QL NAA+PROBE: NEGATIVE
T VAGINALIS DNA VAG QL NAA+PROBE: NEGATIVE
UREAPLASMA DNA SPEC QL NAA+PROBE: POSITIVE

## 2023-04-16 RX ORDER — FLUCONAZOLE 150 MG/1
150 TABLET ORAL ONCE
Qty: 1 TABLET | Refills: 1 | Status: SHIPPED | OUTPATIENT
Start: 2023-04-16 | End: 2023-04-16

## 2023-04-16 RX ORDER — DOXYCYCLINE HYCLATE 100 MG
100 TABLET ORAL 2 TIMES DAILY
Qty: 14 TABLET | Refills: 0 | Status: SHIPPED | OUTPATIENT
Start: 2023-04-16 | End: 2023-04-23

## 2023-04-17 ENCOUNTER — PREP FOR SURGERY (OUTPATIENT)
Dept: OTHER | Facility: HOSPITAL | Age: 43
End: 2023-04-17
Payer: MEDICARE

## 2023-04-17 DIAGNOSIS — R10.2 PELVIC PAIN: Primary | ICD-10-CM

## 2023-04-17 DIAGNOSIS — D49.59 OVARIAN NEOPLASM: ICD-10-CM

## 2023-04-17 RX ORDER — SODIUM CHLORIDE 0.9 % (FLUSH) 0.9 %
10 SYRINGE (ML) INJECTION AS NEEDED
OUTPATIENT
Start: 2023-04-17

## 2023-04-17 RX ORDER — SODIUM CHLORIDE 0.9 % (FLUSH) 0.9 %
10 SYRINGE (ML) INJECTION EVERY 12 HOURS SCHEDULED
OUTPATIENT
Start: 2023-04-17

## 2023-04-17 RX ORDER — SODIUM CHLORIDE 9 MG/ML
40 INJECTION, SOLUTION INTRAVENOUS AS NEEDED
OUTPATIENT
Start: 2023-04-17

## 2023-04-17 RX ORDER — CEFAZOLIN SODIUM 2 G/50ML
2 SOLUTION INTRAVENOUS ONCE
OUTPATIENT
Start: 2023-04-17 | End: 2023-04-17

## 2023-04-18 PROBLEM — D49.59 OVARIAN NEOPLASM: Status: ACTIVE | Noted: 2023-04-18

## 2023-04-19 RX ORDER — LINACLOTIDE 145 UG/1
CAPSULE, GELATIN COATED ORAL
Qty: 30 CAPSULE | Refills: 10 | OUTPATIENT
Start: 2023-04-19

## 2023-04-27 ENCOUNTER — HOSPITAL ENCOUNTER (OUTPATIENT)
Dept: MAMMOGRAPHY | Facility: HOSPITAL | Age: 43
Discharge: HOME | End: 2023-04-27
Payer: MEDICARE

## 2023-04-27 RX ORDER — LINACLOTIDE 145 UG/1
CAPSULE, GELATIN COATED ORAL
Qty: 30 CAPSULE | Refills: 10 | OUTPATIENT
Start: 2023-04-27

## 2023-04-28 ENCOUNTER — PRE-ADMISSION TESTING (OUTPATIENT)
Dept: PREADMISSION TESTING | Facility: HOSPITAL | Age: 43
End: 2023-04-28
Payer: MEDICARE

## 2023-04-28 VITALS
DIASTOLIC BLOOD PRESSURE: 62 MMHG | RESPIRATION RATE: 16 BRPM | BODY MASS INDEX: 24.31 KG/M2 | SYSTOLIC BLOOD PRESSURE: 114 MMHG | WEIGHT: 164.13 LBS | HEART RATE: 51 BPM | OXYGEN SATURATION: 98 % | HEIGHT: 69 IN

## 2023-04-28 LAB
ANION GAP SERPL CALCULATED.3IONS-SCNC: 11.7 MMOL/L (ref 5–15)
BUN SERPL-MCNC: 2 MG/DL (ref 6–20)
BUN/CREAT SERPL: 2.7 (ref 7–25)
CALCIUM SPEC-SCNC: 9.5 MG/DL (ref 8.6–10.5)
CHLORIDE SERPL-SCNC: 105 MMOL/L (ref 98–107)
CO2 SERPL-SCNC: 20.3 MMOL/L (ref 22–29)
CREAT SERPL-MCNC: 0.73 MG/DL (ref 0.57–1)
DEPRECATED RDW RBC AUTO: 50.4 FL (ref 37–54)
EGFRCR SERPLBLD CKD-EPI 2021: 105.5 ML/MIN/1.73
ERYTHROCYTE [DISTWIDTH] IN BLOOD BY AUTOMATED COUNT: 14.6 % (ref 12.3–15.4)
GLUCOSE SERPL-MCNC: 102 MG/DL (ref 65–99)
HCT VFR BLD AUTO: 40.7 % (ref 34–46.6)
HGB BLD-MCNC: 13.3 G/DL (ref 12–15.9)
MCH RBC QN AUTO: 31.1 PG (ref 26.6–33)
MCHC RBC AUTO-ENTMCNC: 32.7 G/DL (ref 31.5–35.7)
MCV RBC AUTO: 95.3 FL (ref 79–97)
PLATELET # BLD AUTO: 324 10*3/MM3 (ref 140–450)
PMV BLD AUTO: 10.5 FL (ref 6–12)
POTASSIUM SERPL-SCNC: 2.9 MMOL/L (ref 3.5–5.2)
QT INTERVAL: 473 MS
RBC # BLD AUTO: 4.27 10*6/MM3 (ref 3.77–5.28)
SODIUM SERPL-SCNC: 137 MMOL/L (ref 136–145)
WBC NRBC COR # BLD: 8.87 10*3/MM3 (ref 3.4–10.8)

## 2023-04-28 PROCEDURE — 85027 COMPLETE CBC AUTOMATED: CPT | Performed by: OBSTETRICS & GYNECOLOGY

## 2023-04-28 PROCEDURE — 36415 COLL VENOUS BLD VENIPUNCTURE: CPT

## 2023-04-28 PROCEDURE — 93010 ELECTROCARDIOGRAM REPORT: CPT | Performed by: INTERNAL MEDICINE

## 2023-04-28 PROCEDURE — 93005 ELECTROCARDIOGRAM TRACING: CPT

## 2023-04-28 PROCEDURE — 80048 BASIC METABOLIC PNL TOTAL CA: CPT | Performed by: OBSTETRICS & GYNECOLOGY

## 2023-04-28 RX ORDER — MECLIZINE HYDROCHLORIDE 25 MG/1
25 TABLET ORAL 3 TIMES DAILY PRN
COMMUNITY

## 2023-04-28 NOTE — PAT
Pt here for PAT visit.  Pre-op tests completed, chg soap given, and instructions reviewed.  Instructed clears until 5:30 am dos, voiced understanding. Office notified of abnormal labs.

## 2023-04-28 NOTE — DISCHARGE INSTRUCTIONS
PRE-ADMISSION TESTING INSTRUCTIONS FOR ADULTS    Take these medications the morning of surgery with a small sip of water:   alprazolam, amlodipine, fluoxetine, omeprazole, levothyroxine, and topamax      Do not take any insulin or diabetes medications the morning of surgery.      No aspirin, advil, aleve, ibuprofen, naproxen, diet pills, decongestants, or herbal/vitamins for a week prior to surgery.       Tylenol/Acetaminophen is okay to take if needed.    General Instructions:    DO NOT EAT SOLID FOOD AFTER MIDNIGHT THE NIGHT BEFORE SURGERY. No gum, mints, or hard candy after midnight the night before surgery.  You may drink clear liquids the day of surgery up until 2 hours before your arrival time.  (5:30 am)  Clear liquids are liquids you can see through. Nothing RED in color.    Plain water    Sports drinks      Gelatin (Jell-O)  Fruit juices without pulp such as white grape juice and apple juice  Popsicles that contain no fruit or yogurt  Tea or coffee (no cream or milk added)    It is beneficial for you to have a clear drink that contains carbohydrates 2 hours before your arrival time.  We suggest a 20 ounce bottle of Gatorade or Powerade for non-diabetic patients or a 20 ounce bottle of Gatorade Zero or Powerade Zero for diabetic patients.  (5:30 am)    Patients who avoid smoking, chewing tobacco and alcohol for 4 weeks prior to surgery have a reduced risk of post-operative complications.  If at all possible, quit smoking as many days before surgery as you can.    Do not smoke, use chewing tobacco or drink alcohol the day of surgery    Bring your C-PAP/ BI-PAP machine if you use one.  Wear clean comfortable clothes.  Do not wear contact lenses, lotion, deodorant, or make-up.  Bring a case for your glasses if applicable. You may brush your teeth the morning of surgery.  You may wear dentures/partials, do not put adhesive/glue on them.  Leave all other jewelry and valuables at home.      Preventing a Surgical  Site Infection:    Shower the night before and on the morning of surgery using the chlorhexidine soap you were given.  Use a clean washcloth with the soap.  Place clean sheets on your bed after showering the night before surgery. Do not use the CHG soap on your hair, face, or private areas. Wash your body gently for five (5) minutes. Do not scrub your skin.  Dry with a clean towel and dress in clean clothing.  Do not shave the surgical area for 10 days-2 weeks prior to surgery  because the razor can irritate skin and make it easier to develop an infection.  Make sure you, your family, and all healthcare providers clean their hands with soap and water or an alcohol based hand  before caring for you or your wound.      Day of surgery:    Your surgeon’s office will advise you of your arrival time for the day of surgery.    Upon arrival, a Pre-op nurse and Anesthesia provider will review your health history, obtain vital signs, and answer questions you may have. The anesthesia provider will also discuss the type of anesthesia that will be needed for your procedure, which may include general anesthesia. The only belongings needed at this time will be your home medications and if applicable your C-PAP/BI-PAP machine.  If you are staying overnight your family can leave the rest of your belongings in the car and bring them to your room later.  A Pre-op nurse will start an IV and you may receive medication in preparation for surgery, including something to help you relax.  Your family will be able to see you in the Pre-op area.  While you are in surgery your family should notify the waiting room  if they leave the waiting room area and provide a contact phone number.    IF you have any questions, you can call the Pre-Admission Department at [phone] or your surgeon's office.  Notify your surgeon if  you become sick, have a fever, productive cough, or cannot be here the day of surgery    Please be  aware that surgery does come with discomfort.  We want to make every effort to control your discomfort so please discuss any uncontrolled symptoms with your nurse.   Your doctor will most likely have prescribed pain medications.      If you are going home after surgery, you will receive individualized written care instructions before being discharged.  A responsible adult (over the age of 18) must drive you to and from the hospital on the day of your surgery and stay with you for 24 hours after anesthesia.    If you are staying overnight following surgery, you will be transported to your hospital room following the recovery period.  [facility] has all private rooms.    You may receive a survey regarding the care you received. Your feedback is very important and will be used to collect the necessary data to help us to continue to provide excellent care.     Deductibles and co-payments are collected on the day of service. Please be prepared to pay the required co-pay, deductible or deposit on the day of service as defined by your plan.

## 2023-05-01 ENCOUNTER — ANESTHESIA EVENT (OUTPATIENT)
Dept: PERIOP | Facility: HOSPITAL | Age: 43
End: 2023-05-01
Payer: MEDICARE

## 2023-05-01 PROCEDURE — S0260 H&P FOR SURGERY: HCPCS | Performed by: OBSTETRICS & GYNECOLOGY

## 2023-05-01 NOTE — H&P
Patient Care Team:  Familia Gonzalez MD as PCP - General (Internal Medicine)    Chief complaint pelvic pain and R ovarian cyst       HPI:   43 yo est pt here for dx lsc for R ovarian cyst and  pelvic pain. She has had a hysterectomy in the past and in  1/2023 she had acute LLQ pain and had a lsc LSO that showed a B9 hemorrhagic CL and a serous cystadenoma. Her R ovary was normal at that time. She is now having RLQ pain and her US shows a globally enlarged R ovary that has multiple cystic areas measuring 2 x 1.5cm, 1.1 x 1.0 cm, 1.4 x 1.4 cm, 1.4 x 1.2 cm, 1.8 x 2.3 cm, 1.4 x 2.3 cm. There is no free fluid. We discussed ovarian suppression with progesterone vs lsc RSO and she desires to proceed with RSO. We had a long discussion about surgical menopause and she voices understanding.      PMH:   Past Medical History:   Diagnosis Date   • Abdominal pain     pt states normal gastric emptying study, lost 40 lbs since 1/2023, being referred to GI   • ADHD (attention deficit hyperactivity disorder)    • Bipolar 1 disorder    • Depression    • Endometriosis    • GERD (gastroesophageal reflux disease)    • Hypertension    • Hypothyroid    • Migraines    • Ovarian cyst    • Psychosis    • PTSD (post-traumatic stress disorder)          PSH:   Past Surgical History:   Procedure Laterality Date   • APPENDECTOMY     • CHOLECYSTECTOMY WITH INTRAOPERATIVE CHOLANGIOGRAM N/A 04/14/2022    Procedure: CHOLECYSTECTOMY LAPAROSCOPIC INTRAOPERATIVE CHOLANGIOGRAM;  Surgeon: Mimi Sahu DO;  Location: [facility] OR;  Service: General;  Laterality: N/A;   • COLONOSCOPY     • COLONOSCOPY W/ POLYPECTOMY N/A 05/24/2022    Procedure: Colonoscopy with polypectomy;  Surgeon: Mimi Sahu DO;  Location: [facility] OR;  Service: Gastroenterology;  Laterality: N/A;  rectal polyp   • DIAGNOSTIC LAPAROSCOPY N/A 01/12/2023    Procedure: DIAGNOSTIC LAPAROSCOPY, left oophorectomy;  Surgeon: John Pro MD;  Location: [facility]  OR;  Service: Obstetrics/Gynecology;  Laterality: N/A;   • ENDOSCOPY N/A 2022    Procedure: Esophagogastroduodenoscopy with biopsy;  Surgeon: Mimi Sahu DO;  Location: [facility] OR;  Service: Gastroenterology;  Laterality: N/A;  gastritis  CAMRYN test   • HYSTERECTOMY     • OOPHORECTOMY         SoHx:   Social History     Socioeconomic History   • Marital status:    Tobacco Use   • Smoking status: Former     Packs/day: 1.00     Years: 5.00     Pack years: 5.00     Types: Cigarettes     Quit date: 2022     Years since quittin.1   • Smokeless tobacco: Never   • Tobacco comments:     caff use   Vaping Use   • Vaping Use: Never used   Substance and Sexual Activity   • Alcohol use: No   • Drug use: No   • Sexual activity: Defer       FHx:   Family History   Problem Relation Age of Onset   • Heart disease Mother 44   • Migraines Mother    • Breast cancer Mother    • Heart attack Mother 44   • Heart disease Father 46   • Hypertension Father    • Colon cancer Father    • Kidney disease Father    • Heart attack Father 65   • Stroke Father    • Diabetes Father    • Aneurysm Paternal Grandmother    • Malig Hyperthermia Neg Hx        PGyn Hx:   No abnormal paps  S/p hysterectomy/LSO  endometriosis      POBHx:   deferred    Allergies: Sulfa antibiotics    Medications:   No current facility-administered medications on file prior to encounter.     Current Outpatient Medications on File Prior to Encounter   Medication Sig Dispense Refill   • ALPRAZolam (XANAX) 0.25 MG tablet Take 1 tablet by mouth 2 (Two) Times a Day. Take dos     • amLODIPine (NORVASC) 2.5 MG tablet Take 1 tablet by mouth Daily. Take dos     • FLUoxetine (PROzac) 40 MG capsule Take 1 capsule by mouth Daily. Take dos     • Humira 40 MG/0.8ML Prefilled Syringe Kit injection Inject 40 mg under the skin into the appropriate area as directed Every 14 (Fourteen) Days.     • ibuprofen (ADVIL,MOTRIN) 600 MG tablet Take 1 tablet by mouth Every 6  (Six) Hours. (Patient taking differently: Take 1 tablet by mouth Every 6 (Six) Hours As Needed for Mild Pain.) 30 tablet 0   • levothyroxine (SYNTHROID, LEVOTHROID) 125 MCG tablet Take 1 tablet by mouth Every Morning. Take dos     • linaclotide (LINZESS) 145 MCG capsule capsule Take 1 capsule by mouth Every Morning Before Breakfast. 30 capsule 11   • lithium (ESKALITH) 450 MG CR tablet Take 2 tablets by mouth Every Night.     • omeprazole (priLOSEC) 40 MG capsule Take 1 capsule by mouth Daily. Take dos     • topiramate (TOPAMAX) 100 MG tablet Take 1 tablet by mouth 2 (Two) Times a Day. Take dos         There were no vitals taken for this visit.    Review of Systems   Constitutional: Positive for activity change, appetite change and unexpected weight change. Negative for fatigue and fever.   Eyes: Negative for photophobia and visual disturbance.   Respiratory: Negative for cough and shortness of breath.    Cardiovascular: Negative for chest pain and palpitations.   Gastrointestinal: Positive for abdominal distention, abdominal pain and nausea. Negative for constipation and diarrhea.   Endocrine: Negative for cold intolerance and heat intolerance.   Genitourinary: Positive for dyspareunia, pelvic pain and vaginal pain. Negative for dysuria, menstrual problem and vaginal discharge.   Musculoskeletal: Negative for back pain.   Skin: Negative for color change and rash.   Neurological: Negative for headaches.   Hematological: Negative for adenopathy. Does not bruise/bleed easily.   Psychiatric/Behavioral: Negative for dysphoric mood. The patient is not nervous/anxious.        Physical Exam  Vitals and nursing note reviewed. Exam conducted with a chaperone present.   Constitutional:       Appearance: Normal appearance. She is well-developed.   HENT:      Head: Normocephalic and atraumatic.   Eyes:      General: No scleral icterus.     Conjunctiva/sclera: Conjunctivae normal.   Neck:      Thyroid: No thyromegaly.    Cardiovascular:      Rate and Rhythm: Normal rate and regular rhythm.   Pulmonary:      Effort: Pulmonary effort is normal.      Breath sounds: Normal breath sounds.   Abdominal:      General: Bowel sounds are normal. There is no distension.      Palpations: Abdomen is soft. There is no mass.      Tenderness: There is no abdominal tenderness. There is no guarding or rebound.      Hernia: No hernia is present.   Genitourinary:     Labia:         Right: No rash, tenderness, lesion or injury.         Left: No rash, tenderness, lesion or injury.       Urethra: No prolapse, urethral pain, urethral swelling or urethral lesion.      Vagina: Vaginal discharge and tenderness present.      Uterus: Absent.       Adnexa:         Right: Tenderness present.       Comments: Uterus and cervix absent  + LPS  Discharge noted  Musculoskeletal:      Cervical back: Neck supple.   Skin:     General: Skin is warm and dry.   Neurological:      Mental Status: She is alert and oriented to person, place, and time.   Psychiatric:         Behavior: Behavior normal.         Thought Content: Thought content normal.         Judgment: Judgment normal.         Debilities/Disabilities Identified: None    Labs:     Lab Results (last 7 days)     ** No results found for the last 168 hours. **          Assessment/Plan:   Pelvic pain and ovarian neoplasm- check  and CEA. Pt desires RSO. Plan dx lsc RSO, possible ex lap. Risks, benefits and alternatives of the procedure were discussed, including , but not limited to: infection, bleeding, transfusion, injury to adjacent structures, laparotomy, possible nondiagnostic findings, possible findings of unexpected malignancy, reoperation, recurrent symptoms, thromboembolic events, anaeasthetic complications and death. Pre/intra/postop course was reviewed and all questions answered. Patient was encouraged to call for any additional questions she might have in the future.       I discussed the patients  findings and my recommendations with patient.     Irene Aguilar MD  05/01/23  19:16 EDT

## 2023-05-02 ENCOUNTER — HOSPITAL ENCOUNTER (OUTPATIENT)
Facility: HOSPITAL | Age: 43
Setting detail: HOSPITAL OUTPATIENT SURGERY
Discharge: HOME | End: 2023-05-02
Attending: OBSTETRICS & GYNECOLOGY | Admitting: OBSTETRICS & GYNECOLOGY
Payer: MEDICARE

## 2023-05-02 ENCOUNTER — ANESTHESIA (OUTPATIENT)
Dept: PERIOP | Facility: HOSPITAL | Age: 43
End: 2023-05-02
Payer: MEDICARE

## 2023-05-02 VITALS
TEMPERATURE: 97.4 F | BODY MASS INDEX: 24.25 KG/M2 | WEIGHT: 164.2 LBS | OXYGEN SATURATION: 95 % | DIASTOLIC BLOOD PRESSURE: 53 MMHG | HEART RATE: 54 BPM | SYSTOLIC BLOOD PRESSURE: 103 MMHG | RESPIRATION RATE: 14 BRPM

## 2023-05-02 DIAGNOSIS — Z98.890 S/P LAPAROSCOPY: Primary | ICD-10-CM

## 2023-05-02 DIAGNOSIS — D49.59 OVARIAN NEOPLASM: ICD-10-CM

## 2023-05-02 DIAGNOSIS — R10.2 PELVIC PAIN: ICD-10-CM

## 2023-05-02 PROCEDURE — 25010000002 FENTANYL CITRATE (PF) 50 MCG/ML SOLUTION: Performed by: ANESTHESIOLOGY

## 2023-05-02 PROCEDURE — 0 CEFAZOLIN SODIUM-DEXTROSE 2-3 GM-%(50ML) RECONSTITUTED SOLUTION: Performed by: OBSTETRICS & GYNECOLOGY

## 2023-05-02 PROCEDURE — 25010000002 ONDANSETRON PER 1 MG: Performed by: NURSE ANESTHETIST, CERTIFIED REGISTERED

## 2023-05-02 PROCEDURE — 88307 TISSUE EXAM BY PATHOLOGIST: CPT | Performed by: OBSTETRICS & GYNECOLOGY

## 2023-05-02 PROCEDURE — 58661 LAPAROSCOPY REMOVE ADNEXA: CPT | Performed by: OBSTETRICS & GYNECOLOGY

## 2023-05-02 PROCEDURE — 25010000002 PROPOFOL 200 MG/20ML EMULSION: Performed by: ANESTHESIOLOGY

## 2023-05-02 PROCEDURE — 25010000002 DEXAMETHASONE PER 1 MG: Performed by: NURSE ANESTHETIST, CERTIFIED REGISTERED

## 2023-05-02 PROCEDURE — 25010000002 MIDAZOLAM PER 1MG: Performed by: NURSE ANESTHETIST, CERTIFIED REGISTERED

## 2023-05-02 PROCEDURE — 25010000002 NEOSTIGMINE 10 MG/10ML SOLUTION: Performed by: ANESTHESIOLOGY

## 2023-05-02 RX ORDER — PROPOFOL 10 MG/ML
INJECTION, EMULSION INTRAVENOUS AS NEEDED
Status: DISCONTINUED | OUTPATIENT
Start: 2023-05-02 | End: 2023-05-02 | Stop reason: SURG

## 2023-05-02 RX ORDER — BUPIVACAINE HYDROCHLORIDE AND EPINEPHRINE 5; 5 MG/ML; UG/ML
INJECTION, SOLUTION EPIDURAL; INTRACAUDAL; PERINEURAL AS NEEDED
Status: DISCONTINUED | OUTPATIENT
Start: 2023-05-02 | End: 2023-05-02 | Stop reason: HOSPADM

## 2023-05-02 RX ORDER — CEFAZOLIN SODIUM 2 G/50ML
2 SOLUTION INTRAVENOUS ONCE
Status: COMPLETED | OUTPATIENT
Start: 2023-05-02 | End: 2023-05-02

## 2023-05-02 RX ORDER — OXYCODONE AND ACETAMINOPHEN 7.5; 325 MG/1; MG/1
1 TABLET ORAL ONCE AS NEEDED
Status: DISCONTINUED | OUTPATIENT
Start: 2023-05-02 | End: 2023-05-02 | Stop reason: HOSPADM

## 2023-05-02 RX ORDER — ONDANSETRON 2 MG/ML
4 INJECTION INTRAMUSCULAR; INTRAVENOUS ONCE AS NEEDED
Status: COMPLETED | OUTPATIENT
Start: 2023-05-02 | End: 2023-05-02

## 2023-05-02 RX ORDER — KETAMINE HYDROCHLORIDE 10 MG/ML
INJECTION INTRAMUSCULAR; INTRAVENOUS AS NEEDED
Status: DISCONTINUED | OUTPATIENT
Start: 2023-05-02 | End: 2023-05-02 | Stop reason: SURG

## 2023-05-02 RX ORDER — SODIUM CHLORIDE, SODIUM LACTATE, POTASSIUM CHLORIDE, CALCIUM CHLORIDE 600; 310; 30; 20 MG/100ML; MG/100ML; MG/100ML; MG/100ML
100 INJECTION, SOLUTION INTRAVENOUS CONTINUOUS
Status: DISCONTINUED | OUTPATIENT
Start: 2023-05-02 | End: 2023-05-02 | Stop reason: HOSPADM

## 2023-05-02 RX ORDER — OXYCODONE HYDROCHLORIDE AND ACETAMINOPHEN 5; 325 MG/1; MG/1
1 TABLET ORAL EVERY 4 HOURS PRN
Qty: 24 TABLET | Refills: 0 | Status: SHIPPED | OUTPATIENT
Start: 2023-05-02

## 2023-05-02 RX ORDER — FENTANYL CITRATE 50 UG/ML
INJECTION, SOLUTION INTRAMUSCULAR; INTRAVENOUS AS NEEDED
Status: DISCONTINUED | OUTPATIENT
Start: 2023-05-02 | End: 2023-05-02 | Stop reason: SURG

## 2023-05-02 RX ORDER — ROCURONIUM BROMIDE 10 MG/ML
INJECTION, SOLUTION INTRAVENOUS AS NEEDED
Status: DISCONTINUED | OUTPATIENT
Start: 2023-05-02 | End: 2023-05-02 | Stop reason: SURG

## 2023-05-02 RX ORDER — ONDANSETRON 2 MG/ML
4 INJECTION INTRAMUSCULAR; INTRAVENOUS ONCE AS NEEDED
Status: DISCONTINUED | OUTPATIENT
Start: 2023-05-02 | End: 2023-05-02 | Stop reason: HOSPADM

## 2023-05-02 RX ORDER — SODIUM CHLORIDE 9 MG/ML
40 INJECTION, SOLUTION INTRAVENOUS AS NEEDED
Status: DISCONTINUED | OUTPATIENT
Start: 2023-05-02 | End: 2023-05-02 | Stop reason: HOSPADM

## 2023-05-02 RX ORDER — NEOSTIGMINE METHYLSULFATE 1 MG/ML
INJECTION, SOLUTION INTRAVENOUS AS NEEDED
Status: DISCONTINUED | OUTPATIENT
Start: 2023-05-02 | End: 2023-05-02 | Stop reason: SURG

## 2023-05-02 RX ORDER — IBUPROFEN 600 MG/1
600 TABLET ORAL EVERY 6 HOURS PRN
Qty: 30 TABLET | Refills: 0 | Status: SHIPPED | OUTPATIENT
Start: 2023-05-02

## 2023-05-02 RX ORDER — MIDAZOLAM HYDROCHLORIDE 2 MG/2ML
1 INJECTION, SOLUTION INTRAMUSCULAR; INTRAVENOUS
Status: DISCONTINUED | OUTPATIENT
Start: 2023-05-02 | End: 2023-05-02 | Stop reason: HOSPADM

## 2023-05-02 RX ORDER — LIDOCAINE HYDROCHLORIDE 20 MG/ML
INJECTION, SOLUTION INFILTRATION; PERINEURAL AS NEEDED
Status: DISCONTINUED | OUTPATIENT
Start: 2023-05-02 | End: 2023-05-02 | Stop reason: SURG

## 2023-05-02 RX ORDER — FAMOTIDINE 10 MG/ML
20 INJECTION, SOLUTION INTRAVENOUS
Status: COMPLETED | OUTPATIENT
Start: 2023-05-02 | End: 2023-05-02

## 2023-05-02 RX ORDER — OXYCODONE HYDROCHLORIDE AND ACETAMINOPHEN 5; 325 MG/1; MG/1
1 TABLET ORAL ONCE AS NEEDED
Status: DISCONTINUED | OUTPATIENT
Start: 2023-05-02 | End: 2023-05-02 | Stop reason: HOSPADM

## 2023-05-02 RX ORDER — GLYCOPYRROLATE 0.2 MG/ML
INJECTION INTRAMUSCULAR; INTRAVENOUS AS NEEDED
Status: DISCONTINUED | OUTPATIENT
Start: 2023-05-02 | End: 2023-05-02 | Stop reason: SURG

## 2023-05-02 RX ORDER — DEXAMETHASONE SODIUM PHOSPHATE 4 MG/ML
8 INJECTION, SOLUTION INTRA-ARTICULAR; INTRALESIONAL; INTRAMUSCULAR; INTRAVENOUS; SOFT TISSUE ONCE
Status: COMPLETED | OUTPATIENT
Start: 2023-05-02 | End: 2023-05-02

## 2023-05-02 RX ORDER — SODIUM CHLORIDE 0.9 % (FLUSH) 0.9 %
10 SYRINGE (ML) INJECTION AS NEEDED
Status: DISCONTINUED | OUTPATIENT
Start: 2023-05-02 | End: 2023-05-02 | Stop reason: HOSPADM

## 2023-05-02 RX ORDER — LIDOCAINE HYDROCHLORIDE 10 MG/ML
0.5 INJECTION, SOLUTION EPIDURAL; INFILTRATION; INTRACAUDAL; PERINEURAL ONCE AS NEEDED
Status: DISCONTINUED | OUTPATIENT
Start: 2023-05-02 | End: 2023-05-02 | Stop reason: HOSPADM

## 2023-05-02 RX ORDER — SODIUM CHLORIDE 0.9 % (FLUSH) 0.9 %
10 SYRINGE (ML) INJECTION EVERY 12 HOURS SCHEDULED
Status: DISCONTINUED | OUTPATIENT
Start: 2023-05-02 | End: 2023-05-02 | Stop reason: HOSPADM

## 2023-05-02 RX ORDER — SODIUM CHLORIDE, SODIUM LACTATE, POTASSIUM CHLORIDE, CALCIUM CHLORIDE 600; 310; 30; 20 MG/100ML; MG/100ML; MG/100ML; MG/100ML
9 INJECTION, SOLUTION INTRAVENOUS CONTINUOUS
Status: DISCONTINUED | OUTPATIENT
Start: 2023-05-02 | End: 2023-05-02 | Stop reason: HOSPADM

## 2023-05-02 RX ORDER — MAGNESIUM HYDROXIDE 1200 MG/15ML
LIQUID ORAL AS NEEDED
Status: DISCONTINUED | OUTPATIENT
Start: 2023-05-02 | End: 2023-05-02 | Stop reason: HOSPADM

## 2023-05-02 RX ADMIN — CEFAZOLIN SODIUM 2 G: 2 SOLUTION INTRAVENOUS at 10:30

## 2023-05-02 RX ADMIN — FENTANYL CITRATE 25 MCG: 50 INJECTION, SOLUTION INTRAMUSCULAR; INTRAVENOUS at 10:35

## 2023-05-02 RX ADMIN — FAMOTIDINE 20 MG: 10 INJECTION, SOLUTION INTRAVENOUS at 08:20

## 2023-05-02 RX ADMIN — LIDOCAINE HYDROCHLORIDE 100 MG: 20 INJECTION, SOLUTION INFILTRATION; PERINEURAL at 10:25

## 2023-05-02 RX ADMIN — GLYCOPYRROLATE 0.3 MG: 0.2 INJECTION INTRAMUSCULAR; INTRAVENOUS at 11:00

## 2023-05-02 RX ADMIN — ROCURONIUM BROMIDE 5 MG: 10 INJECTION, SOLUTION INTRAVENOUS at 10:23

## 2023-05-02 RX ADMIN — GLYCOPYRROLATE 0.2 MG: 0.2 INJECTION INTRAMUSCULAR; INTRAVENOUS at 10:22

## 2023-05-02 RX ADMIN — ONDANSETRON 4 MG: 2 INJECTION INTRAMUSCULAR; INTRAVENOUS at 08:20

## 2023-05-02 RX ADMIN — SODIUM CHLORIDE, POTASSIUM CHLORIDE, SODIUM LACTATE AND CALCIUM CHLORIDE 9 ML/HR: 600; 310; 30; 20 INJECTION, SOLUTION INTRAVENOUS at 08:20

## 2023-05-02 RX ADMIN — DEXAMETHASONE SODIUM PHOSPHATE 8 MG: 4 INJECTION, SOLUTION INTRAMUSCULAR; INTRAVENOUS at 08:20

## 2023-05-02 RX ADMIN — PROPOFOL INJECTABLE EMULSION 150 MG: 10 INJECTION, EMULSION INTRAVENOUS at 10:25

## 2023-05-02 RX ADMIN — FENTANYL CITRATE 25 MCG: 50 INJECTION, SOLUTION INTRAMUSCULAR; INTRAVENOUS at 10:44

## 2023-05-02 RX ADMIN — MIDAZOLAM HYDROCHLORIDE 1 MG: 1 INJECTION, SOLUTION INTRAMUSCULAR; INTRAVENOUS at 10:15

## 2023-05-02 RX ADMIN — KETAMINE HYDROCHLORIDE 20 MG: 10 INJECTION INTRAMUSCULAR; INTRAVENOUS at 10:27

## 2023-05-02 RX ADMIN — ROCURONIUM BROMIDE 20 MG: 10 INJECTION, SOLUTION INTRAVENOUS at 10:25

## 2023-05-02 RX ADMIN — NEOSTIGMINE METHYLSULFATE 3 MG: 1 INJECTION INTRAVENOUS at 11:00

## 2023-05-02 NOTE — OP NOTE
Subjective     Date of Service:  05/02/23  Time of Service:  11:03 EDT    Surgical Staff: Surgeon(s) and Role:     * Irene Aguilar MD - Primary   Additional Staff: Francisco Paul CFA   Pre-operative diagnosis(es): Pre-Op Diagnosis Codes:     * Pelvic pain [R10.2]     * Ovarian neoplasm [D49.59]     Post-operative diagnosis(es): Post-Op Diagnosis Codes:     * Pelvic pain [R10.2]     * Ovarian neoplasm [D49.59]   Procedure(s): Procedure(s):  DIAGNOSTIC LAPAROSCOPY, right salpingoophorectomy     Antibiotics: cefazolin (Ancef)ordered on call to OR     Anesthesia: Type: General  ASA:  II     Objective      Operative findings: Normal pelvis  Multicystic R ovary     Specimens removed: ID Type Source Tests Collected by Time   A (Not marked as sent) :  Tissue Ovary, Right with Fallopian Tube TISSUE PATHOLOGY EXAM Ashlyn Adame RN 5/2/2023 1055      Fluid Intake: 400mL   Output: Documented Output  Est. Blood Loss 10mL  Urine Output 30 mL      I/O this shift:  In: -   Out: 40 [Urine:30; Blood:10]     Blood products used: No   Drains: [REMOVED] NG/OG Tube Orogastric 16 Fr Center mouth (Removed)       [REMOVED] Urethral Catheter Non-latex;Straight-tip 16 Fr. (Removed)      Implant Information: Nothing was implanted during the procedure   Complications: None apparent   Intraoperative consult(s): none   Condition: stable   Disposition: to PACU and then admit to home         Assessment & Plan     After informed consent was obtained and she was taken to the operating room where general endotracheal anesthesia was administered without difficulty.  She was placed in yellowfin stirrups and prepped and draped in normal sterile fashion with a sponge stick in the vagina.  An infra umbilical skin incision was made and the Veress needle was inserted at a 45 degree angle.  The water drop test was positive.  Opening pressures were low and insufflation was begun until there was tympany in all 4 quadrants.  A 10 mm clear view trocar  was then placed under direct visualization direct entry into the abdomen was confirmed with the laparoscope.  An 8 mm left lower quadrant and a 5 mm right lower quadrant port were then placed under direct visualization. The incisions were inflitrated with lidocaine.  The patient was then placed in Trendelenburg.  The liver edge was normal.  All peritoneal surfaces were normal.  Survey of the pelvis revealed a multicystic right ovary.  The ovary was lifted off of the pelvic sidewall and the IP ligament was then coagulated and cut with the Harmonic scalpel. All operative sites were hemostatic, even at low pressure. .  The procedure was deemed complete.  All instruments were removed under direct visualization and gas was allowed to escape the abdomen.  The umbilical fascia was closed with 0 Vicryl and the skin incisions were closed with 4-0 Monocryl.  Sponge, lap, needle and instrument counts were all correct.  The patient was extubated without difficulty and taken to recovery room in good condition.  She will be discharged home when she meets recovery room criteria.  Postop instructions and restrictions were reviewed with the patient in detail and all of her questions were answered.  She is to follow-up in the office in 2 weeks for postop check and to call for any concerns.    Assistant: Francisco Paul CSA was responsible for performing the following activities: Retraction, Suturing, Closing, Placing Dressing and Held/Positioned Camera and their skilled assistance was necessary for the success of this case.         Irene Aguilar MD

## 2023-05-02 NOTE — ANESTHESIA PREPROCEDURE EVALUATION
Anesthesia Evaluation     Patient summary reviewed and Nursing notes reviewed   no history of anesthetic complications:  NPO Solid Status: > 8 hours  NPO Liquid Status: > 8 hours           Airway   Mallampati: I  TM distance: >3 FB  Neck ROM: full  No difficulty expected  Dental    (+) edentulous    Pulmonary - negative pulmonary ROS and normal exam    breath sounds clear to auscultation  Cardiovascular - normal exam  Exercise tolerance: good (4-7 METS)    Rhythm: regular  Rate: normal        Neuro/Psych  (+) headaches, numbness, psychiatric history Anxiety and Depression,    GI/Hepatic/Renal/Endo    (+)  GERD well controlled,  thyroid problem hypothyroidism  (-) hepatitis, liver disease, GI bleed    Musculoskeletal (-) negative ROS    Abdominal    Substance History - negative use     OB/GYN negative ob/gyn ROS         Other - negative ROS                       Anesthesia Plan    ASA 2     general     intravenous induction     Anesthetic plan, risks, benefits, and alternatives have been provided, discussed and informed consent has been obtained with: patient.    Use of blood products discussed with patient  Consented to blood products.       CODE STATUS:    Code Status (Patient has no pulse and is not breathing): CPR (Attempt to Resuscitate)  Medical Interventions (Patient has pulse or is breathing): Full Support  Release to patient: Routine Release

## 2023-05-02 NOTE — ANESTHESIA PROCEDURE NOTES
Airway  Urgency: elective    Date/Time: 5/2/2023 10:27 AM  Difficult airway    General Information and Staff    Patient location during procedure: OR  Anesthesiologist: Iwona Eduardo MD    Indications and Patient Condition  Indications for airway management: airway protection    Preoxygenated: yes  MILS maintained throughout  Mask difficulty assessment: 1 - vent by mask    Final Airway Details  Final airway type: endotracheal airway      Successful airway: ETT  Cuffed: yes   Successful intubation technique: direct laryngoscopy  Facilitating devices/methods: intubating stylet  Endotracheal tube insertion site: oral  Blade: Ng  ETT size (mm): 7.0  Cormack-Lehane Classification: grade I - full view of glottis  Placement verified by: chest auscultation and capnometry   Cuff volume (mL): 5  Measured from: lips  ETT/EBT  to lips (cm): 21  Number of attempts at approach: 1  Assessment: lips, teeth, and gum same as pre-op and atraumatic intubation

## 2023-05-02 NOTE — INTERVAL H&P NOTE
H&P reviewed. The patient was examined and there are no changes to the H&P.  BP 96/62 (BP Location: Left arm, Patient Position: Lying)   Pulse 55   Temp 98.4 °F (36.9 °C) (Oral)   Resp 23   Wt 74.5 kg (164 lb 3.2 oz)   SpO2 96%   BMI 24.25 kg/m²   Procedure reviewed and all questions answered. Pt wants her R ovary removed even if it looks normal.  Irene Aguilar MD

## 2023-05-02 NOTE — ANESTHESIA POSTPROCEDURE EVALUATION
Patient: Aruna Hammonds    Procedure Summary     Date: 05/02/23 Room / Location: [facility] OR 1 /  [facility] OR    Anesthesia Start: 1019 Anesthesia Stop: 1119    Procedure: DIAGNOSTIC LAPAROSCOPY, right salpingoophorectomy (Right: Abdomen) Diagnosis:       Pelvic pain      Ovarian neoplasm      (Pelvic pain [R10.2])      (Ovarian neoplasm [D49.59])    Surgeons: Irene Aguilar MD Provider: Iwona Eduardo MD    Anesthesia Type: general ASA Status: 2          Anesthesia Type: general    Vitals  Vitals Value Taken Time   /54 05/02/23 1150   Temp 97 °F (36.1 °C) 05/02/23 1120   Pulse 64 05/02/23 1150   Resp 15 05/02/23 1120   SpO2 96 % 05/02/23 1150   Vitals shown include unvalidated device data.        Post Anesthesia Care and Evaluation    Patient location during evaluation: bedside  Patient participation: complete - patient participated  Level of consciousness: awake and alert  Pain score: 3  Pain management: adequate    Airway patency: patent  Anesthetic complications: No anesthetic complications  PONV Status: none  Cardiovascular status: acceptable  Respiratory status: acceptable  Hydration status: acceptable  No anesthesia care post op

## 2023-05-03 LAB
LAB AP CASE REPORT: NORMAL
PATH REPORT.FINAL DX SPEC: NORMAL
PATH REPORT.GROSS SPEC: NORMAL

## 2023-05-03 RX ORDER — LINACLOTIDE 145 UG/1
CAPSULE, GELATIN COATED ORAL
Qty: 30 CAPSULE | Refills: 0 | OUTPATIENT
Start: 2023-05-03

## 2023-05-03 NOTE — TELEPHONE ENCOUNTER
[facility]:  PT IS ABSOLUTELY NOT UNDER THE CARE OF OUR PROVIDERS.  PLEASE STOP SENDING REFILL REQUESTS

## 2023-05-10 ENCOUNTER — TELEPHONE (OUTPATIENT)
Dept: OBSTETRICS AND GYNECOLOGY | Facility: CLINIC | Age: 43
End: 2023-05-10

## 2023-05-12 ENCOUNTER — HOSPITAL ENCOUNTER (EMERGENCY)
Facility: HOSPITAL | Age: 43
Discharge: HOME | End: 2023-05-12
Attending: EMERGENCY MEDICINE
Payer: MEDICARE

## 2023-05-12 ENCOUNTER — APPOINTMENT (OUTPATIENT)
Dept: CT IMAGING | Facility: HOSPITAL | Age: 43
End: 2023-05-12
Payer: MEDICARE

## 2023-05-12 VITALS
DIASTOLIC BLOOD PRESSURE: 53 MMHG | BODY MASS INDEX: 23.4 KG/M2 | HEIGHT: 69 IN | HEART RATE: 41 BPM | RESPIRATION RATE: 18 BRPM | SYSTOLIC BLOOD PRESSURE: 110 MMHG | OXYGEN SATURATION: 100 % | TEMPERATURE: 98.8 F | WEIGHT: 158 LBS

## 2023-05-12 DIAGNOSIS — R10.84 CHRONIC GENERALIZED ABDOMINAL PAIN: Primary | ICD-10-CM

## 2023-05-12 DIAGNOSIS — G89.29 CHRONIC GENERALIZED ABDOMINAL PAIN: Primary | ICD-10-CM

## 2023-05-12 LAB
ALBUMIN SERPL-MCNC: 3.7 G/DL (ref 3.5–5.2)
ALBUMIN/GLOB SERPL: 1.1 G/DL
ALP SERPL-CCNC: 128 U/L (ref 39–117)
ALT SERPL W P-5'-P-CCNC: 20 U/L (ref 1–33)
ANION GAP SERPL CALCULATED.3IONS-SCNC: 10.5 MMOL/L (ref 5–15)
AST SERPL-CCNC: 35 U/L (ref 1–32)
BASOPHILS # BLD AUTO: 0.04 10*3/MM3 (ref 0–0.2)
BASOPHILS NFR BLD AUTO: 0.4 % (ref 0–1.5)
BILIRUB SERPL-MCNC: 0.4 MG/DL (ref 0–1.2)
BILIRUB UR QL STRIP: NEGATIVE
BUN SERPL-MCNC: 3 MG/DL (ref 6–20)
BUN/CREAT SERPL: 3.4 (ref 7–25)
CALCIUM SPEC-SCNC: 9.4 MG/DL (ref 8.6–10.5)
CHLORIDE SERPL-SCNC: 107 MMOL/L (ref 98–107)
CLARITY UR: CLEAR
CO2 SERPL-SCNC: 22.5 MMOL/L (ref 22–29)
COLOR UR: YELLOW
CREAT SERPL-MCNC: 0.88 MG/DL (ref 0.57–1)
DEPRECATED RDW RBC AUTO: 44.9 FL (ref 37–54)
EGFRCR SERPLBLD CKD-EPI 2021: 84.3 ML/MIN/1.73
EOSINOPHIL # BLD AUTO: 0.17 10*3/MM3 (ref 0–0.4)
EOSINOPHIL NFR BLD AUTO: 1.7 % (ref 0.3–6.2)
ERYTHROCYTE [DISTWIDTH] IN BLOOD BY AUTOMATED COUNT: 12.9 % (ref 12.3–15.4)
GLOBULIN UR ELPH-MCNC: 3.3 GM/DL
GLUCOSE SERPL-MCNC: 92 MG/DL (ref 65–99)
GLUCOSE UR STRIP-MCNC: NEGATIVE MG/DL
HCT VFR BLD AUTO: 40.3 % (ref 34–46.6)
HGB BLD-MCNC: 13 G/DL (ref 12–15.9)
HGB UR QL STRIP.AUTO: NEGATIVE
IMM GRANULOCYTES # BLD AUTO: 0.02 10*3/MM3 (ref 0–0.05)
IMM GRANULOCYTES NFR BLD AUTO: 0.2 % (ref 0–0.5)
KETONES UR QL STRIP: NEGATIVE
LEUKOCYTE ESTERASE UR QL STRIP.AUTO: NEGATIVE
LIPASE SERPL-CCNC: 163 U/L (ref 13–60)
LYMPHOCYTES # BLD AUTO: 2.05 10*3/MM3 (ref 0.7–3.1)
LYMPHOCYTES NFR BLD AUTO: 20.9 % (ref 19.6–45.3)
MCH RBC QN AUTO: 30.1 PG (ref 26.6–33)
MCHC RBC AUTO-ENTMCNC: 32.3 G/DL (ref 31.5–35.7)
MCV RBC AUTO: 93.3 FL (ref 79–97)
MONOCYTES # BLD AUTO: 0.57 10*3/MM3 (ref 0.1–0.9)
MONOCYTES NFR BLD AUTO: 5.8 % (ref 5–12)
NEUTROPHILS NFR BLD AUTO: 6.98 10*3/MM3 (ref 1.7–7)
NEUTROPHILS NFR BLD AUTO: 71 % (ref 42.7–76)
NITRITE UR QL STRIP: NEGATIVE
PH UR STRIP.AUTO: 7 [PH] (ref 4.5–8)
PLATELET # BLD AUTO: 342 10*3/MM3 (ref 140–450)
PMV BLD AUTO: 10.6 FL (ref 6–12)
POTASSIUM SERPL-SCNC: 2.8 MMOL/L (ref 3.5–5.2)
PROT SERPL-MCNC: 7 G/DL (ref 6–8.5)
PROT UR QL STRIP: NEGATIVE
RBC # BLD AUTO: 4.32 10*6/MM3 (ref 3.77–5.28)
SODIUM SERPL-SCNC: 140 MMOL/L (ref 136–145)
SP GR UR STRIP: 1.01 (ref 1–1.03)
UROBILINOGEN UR QL STRIP: NORMAL
WBC NRBC COR # BLD: 9.83 10*3/MM3 (ref 3.4–10.8)

## 2023-05-12 PROCEDURE — 83690 ASSAY OF LIPASE: CPT | Performed by: EMERGENCY MEDICINE

## 2023-05-12 PROCEDURE — 25510000001 IOPAMIDOL PER 1 ML: Performed by: EMERGENCY MEDICINE

## 2023-05-12 PROCEDURE — 74177 CT ABD & PELVIS W/CONTRAST: CPT

## 2023-05-12 PROCEDURE — 80053 COMPREHEN METABOLIC PANEL: CPT | Performed by: EMERGENCY MEDICINE

## 2023-05-12 PROCEDURE — 99283 EMERGENCY DEPT VISIT LOW MDM: CPT

## 2023-05-12 PROCEDURE — 81003 URINALYSIS AUTO W/O SCOPE: CPT | Performed by: EMERGENCY MEDICINE

## 2023-05-12 PROCEDURE — 85025 COMPLETE CBC W/AUTO DIFF WBC: CPT | Performed by: EMERGENCY MEDICINE

## 2023-05-12 PROCEDURE — 96360 HYDRATION IV INFUSION INIT: CPT

## 2023-05-12 RX ORDER — TRAMADOL HYDROCHLORIDE 50 MG/1
TABLET ORAL
Qty: 24 TABLET | Refills: 0 | Status: SHIPPED | OUTPATIENT
Start: 2023-05-12

## 2023-05-12 RX ORDER — POTASSIUM CHLORIDE 20 MEQ/1
40 TABLET, EXTENDED RELEASE ORAL ONCE
Status: COMPLETED | OUTPATIENT
Start: 2023-05-12 | End: 2023-05-12

## 2023-05-12 RX ORDER — SODIUM CHLORIDE 9 MG/ML
250 INJECTION, SOLUTION INTRAVENOUS CONTINUOUS
Status: DISCONTINUED | OUTPATIENT
Start: 2023-05-12 | End: 2023-05-13 | Stop reason: HOSPADM

## 2023-05-12 RX ORDER — SODIUM CHLORIDE 0.9 % (FLUSH) 0.9 %
10 SYRINGE (ML) INJECTION AS NEEDED
Status: DISCONTINUED | OUTPATIENT
Start: 2023-05-12 | End: 2023-05-13 | Stop reason: HOSPADM

## 2023-05-12 RX ORDER — POTASSIUM CHLORIDE 750 MG/1
10 TABLET, FILM COATED, EXTENDED RELEASE ORAL DAILY
Qty: 10 TABLET | Refills: 0 | Status: SHIPPED | OUTPATIENT
Start: 2023-05-12 | End: 2023-05-22

## 2023-05-12 RX ADMIN — IOPAMIDOL 100 ML: 755 INJECTION, SOLUTION INTRAVENOUS at 22:07

## 2023-05-12 RX ADMIN — SODIUM CHLORIDE 250 ML/HR: 9 INJECTION, SOLUTION INTRAVENOUS at 22:12

## 2023-05-12 RX ADMIN — POTASSIUM CHLORIDE 40 MEQ: 1500 TABLET, EXTENDED RELEASE ORAL at 22:12

## 2023-05-13 NOTE — ED PROVIDER NOTES
"Subjective     History provided by:  Patient and medical records    History of Present Illness    · Chief complaint: Abdominal pain    · Location: Generalized abdominal pain.    · Quality/Severity: The pain is severe and constant and sharp stabbing.    · Timing/Onset: The pain started last November 7 months ago.  It got worse about 2 months ago and has been extremely severe the last 2 days.    · Modifying Factors: Eating and drinking exacerbates the pain.    · Associated symptoms: Denies fever.  Denies nausea or vomiting or diarrhea.    · Narrative: The patient is a 42-year-old white female complaining of a 7-month history of chronic abdominal pain that has gotten worse.  She states she was seen by Dr. Pandya, general surgeon at [facility], who performed an EGD and colonoscopy and was told they were negative.  She states she has a gastroenterology appointment at [facility] in [facility] for 7/21/2023.  She currently takes Prilosec 40 mg daily.  She is status postcholecystectomy and hysterectomy.  Earlier this month on 5/2/2023 she had a laparoscopic ovarian cyst removed.  She states she stopped taking nonsteroidals for the pain.    Review of Systems  Past Medical History:   Diagnosis Date   • Abdominal pain     pt states normal gastric emptying study, lost 40 lbs since 1/2023, being referred to GI   • ADHD (attention deficit hyperactivity disorder)    • Bipolar 1 disorder    • Depression    • Endometriosis    • GERD (gastroesophageal reflux disease)    • Hypertension    • Hypothyroid    • Migraines    • Ovarian cyst    • Psychosis    • PTSD (post-traumatic stress disorder)      /53 (BP Location: Right arm, Patient Position: Lying)   Pulse (!) 41   Temp 98.8 °F (37.1 °C) (Oral)   Resp 18   Ht 175.3 cm (69\")   Wt 71.7 kg (158 lb)   SpO2 100%   BMI 23.33 kg/m²     Past Medical History:   Diagnosis Date   • Abdominal pain     pt states normal gastric emptying study, lost 40 " lbs since 1/2023, being referred to GI   • ADHD (attention deficit hyperactivity disorder)    • Bipolar 1 disorder    • Depression    • Endometriosis    • GERD (gastroesophageal reflux disease)    • Hypertension    • Hypothyroid    • Migraines    • Ovarian cyst    • Psychosis    • PTSD (post-traumatic stress disorder)        Allergies   Allergen Reactions   • Sulfa Antibiotics Unknown - Low Severity and Shortness Of Breath     As a child       Past Surgical History:   Procedure Laterality Date   • APPENDECTOMY     • CHOLECYSTECTOMY WITH INTRAOPERATIVE CHOLANGIOGRAM N/A 04/14/2022    Procedure: CHOLECYSTECTOMY LAPAROSCOPIC INTRAOPERATIVE CHOLANGIOGRAM;  Surgeon: Mimi Sahu DO;  Location: [facility];  Service: General;  Laterality: N/A;   • COLONOSCOPY     • COLONOSCOPY W/ POLYPECTOMY N/A 05/24/2022    Procedure: Colonoscopy with polypectomy;  Surgeon: Mimi Sahu DO;  Location: [facility] OR;  Service: Gastroenterology;  Laterality: N/A;  rectal polyp   • DIAGNOSTIC LAPAROSCOPY N/A 01/12/2023    Procedure: DIAGNOSTIC LAPAROSCOPY, left oophorectomy;  Surgeon: John Pro MD;  Location: [facility] OR;  Service: Obstetrics/Gynecology;  Laterality: N/A;   • DIAGNOSTIC LAPAROSCOPY Right 5/2/2023    Procedure: DIAGNOSTIC LAPAROSCOPY, right salpingoophorectomy;  Surgeon: Irene Aguilar MD;  Location: [facility] OR;  Service: Obstetrics/Gynecology;  Laterality: Right;   • ENDOSCOPY N/A 05/24/2022    Procedure: Esophagogastroduodenoscopy with biopsy;  Surgeon: Mimi Sahu DO;  Location: [facility] OR;  Service: Gastroenterology;  Laterality: N/A;  gastritis  CAMRYN test   • HYSTERECTOMY     • OOPHORECTOMY         Family History   Problem Relation Age of Onset   • Heart disease Mother 44   • Migraines Mother    • Breast cancer Mother    • Heart attack Mother 44   • Heart disease Father 46   • Hypertension Father    • Colon cancer Father    • Kidney disease Father    • Heart attack Father 65    • Stroke Father    • Diabetes Father    • Aneurysm Paternal Grandmother    • Malig Hyperthermia Neg Hx        Social History     Socioeconomic History   • Marital status:    Tobacco Use   • Smoking status: Former     Packs/day: 1.00     Years: 5.00     Pack years: 5.00     Types: Cigarettes     Quit date: 2022     Years since quittin.2   • Smokeless tobacco: Never   • Tobacco comments:     caff use   Vaping Use   • Vaping Use: Never used   Substance and Sexual Activity   • Alcohol use: No   • Drug use: No   • Sexual activity: Defer           Objective   Physical Exam  Vitals and nursing note reviewed.   Constitutional:       Appearance: She is well-developed. She is not toxic-appearing or diaphoretic.      Comments: The patient appears older than her stated age.  She appears in no acute distress and does not appear ill.  Review of her vital signs: She is afebrile and all her vital signs are within normal limits.   HENT:      Head: Normocephalic and atraumatic.      Mouth/Throat:      Mouth: Mucous membranes are moist.      Pharynx: Oropharynx is clear.      Comments: Dentulous  Eyes:      General: No scleral icterus.     Extraocular Movements: Extraocular movements intact.      Pupils: Pupils are equal, round, and reactive to light.   Cardiovascular:      Rate and Rhythm: Normal rate and regular rhythm.      Heart sounds: No murmur heard.  Pulmonary:      Effort: Pulmonary effort is normal.      Breath sounds: Normal breath sounds.   Abdominal:      Palpations: Abdomen is soft.      Tenderness: There is generalized abdominal tenderness ( Subjective). There is no guarding or rebound. Negative signs include Crowe's sign and McBurney's sign.      Comments: The patient has a subacute laparoscopic surgical scar at the umbilicus.  She has subacute purplish ecchymosis of the lower abdomen likely a residual of her laparoscopic surgery on 2023.   Skin:     General: Skin is warm and dry.      Capillary  Refill: Capillary refill takes less than 2 seconds.      Coloration: Skin is not jaundiced or pale.      Findings: No erythema or rash.      Comments: Numerous tattoos   Neurological:      General: No focal deficit present.      Mental Status: She is alert.   Psychiatric:      Comments: Frustrated mood.         Procedures           ED Course  ED Course as of 05/12/23 2244   Fri May 12, 2023   2128 Review the patient's laboratory studies: The patient's CBC has a normal white count of 9.8 with a normal differential.  Hemoglobin, hematocrit and platelets within normal limits.  CMP has a low potassium of 2.8, otherwise normal electrolytes and normal renal function.  Slight elevation of the transaminases with an alk phos of 128 and AST of 35 with a normal total bilirubin.  Urine is negative for blood or infection.  Lipase is elevated at 163 which is nonspecific. [TP]   2130 The patient was administered potassium chloride 40 mill equivalents p.o. [TP]   2223 CT of the abdomen pelvis with IV contrast was interpreted by the radiologist as normal solid organs.  Status post appendectomy and cholecystectomy.  Fat stranding of the abdominal wall likely due to recent surgery. [TP]   2229 The patient's CT shows a normal pancreas.  Her symptomatology is not consistent with acute pancreatitis. [TP]   2232 The patient has exacerbation of chronic abdominal pain.  I will prescribe her Ultram for her pain.  He is instructed to keep her gastroenterology appointment for July 21 at [facility]. [TP]      ED Course User Index  [TP] Ramin Catherine MD KASPER reviewed by Ramin Catherine MD       Medical Decision Making  My differential diagnosis for abdominal pain includes but is not limited to:  Gastritis, gastroenteritis, peptic ulcer disease, GERD, esophageal perforation, acute appendicitis, mesenteric adenitis, Meckel’s diverticulum, epiploic appendagitis, diverticulitis, colon cancer, ulcerative  colitis, Crohn’s disease, intussusception, small bowel obstruction, adhesions, ischemic bowel, perforated viscus, ileus, obstipation, biliary colic, cholecystitis, cholelithiasis, Alberto-Christiano Woody, hepatitis, pancreatitis, common bile duct obstruction, cholangitis, bile leak, splenic trauma, splenic rupture, splenic infarction, splenic abscess, abdominal abscess, ascites, spontaneous bacterial peritonitis, hernia, UTI, cystitis, prostatitis, ureterolithiasis, urinary obstruction, AAA, myocardial infarction, pneumonia, cancer, porphyria, DKA, medications, sickle cell, viral syndrome, zoster    Chronic generalized abdominal pain: acute illness or injury  Amount and/or Complexity of Data Reviewed  Labs: ordered. Decision-making details documented in ED Course.  Radiology: ordered. Decision-making details documented in ED Course.      Risk  Prescription drug management.          Final diagnoses:   Chronic generalized abdominal pain       ED Disposition  ED Disposition     ED Disposition   Discharge    Condition   Stable    Comment   --             Familia Gonzalez MD  [id number] [id number] [phone]    Schedule an appointment as soon as possible for a visit in 5 days           Medication List      New Prescriptions    potassium chloride 10 MEQ CR tablet  Take 1 tablet by mouth Daily for 10 days.     traMADol 50 MG tablet  Commonly known as: ULTRAM  1 to 2 tablets p.o. every 6 hours as needed severe pain.           Where to Get Your Medications      These medications were sent to [facility] [id number] [place], [place] - [address] [place] [address] AT [facility]  &  - [phone]  - [phone] FX  [address] [place] [address]    Phone: [phone]   · potassium chloride 10 MEQ CR tablet  · traMADol 50 MG tablet         Labs Reviewed   COMPREHENSIVE METABOLIC PANEL - Abnormal; Notable for the following components:       Result Value    BUN 3 (*)     Potassium 2.8 (*)     AST (SGOT) 35 (*)     Alkaline  Phosphatase 128 (*)     BUN/Creatinine Ratio 3.4 (*)     All other components within normal limits    Narrative:     GFR Normal >60  Chronic Kidney Disease <60  Kidney Failure <15     LIPASE - Abnormal; Notable for the following components:    Lipase 163 (*)     All other components within normal limits   URINALYSIS W/ MICROSCOPIC IF INDICATED (NO CULTURE) - Normal    Narrative:     Urine microscopic not indicated.   CBC WITH AUTO DIFFERENTIAL - Normal   CBC AND DIFFERENTIAL    Narrative:     The following orders were created for panel order CBC & Differential.  Procedure                               Abnormality         Status                     ---------                               -----------         ------                     CBC Auto Differential[[id number]]        Normal              Final result                 Please view results for these tests on the individual orders.     CT Abdomen Pelvis With Contrast   Final Result       1. Appendectomy, otherwise grossly normal unopacified GI tract.   2. Cholecystectomy and hysterectomy.   3. Hepatic steatosis.   4. Nonobstructed kidneys.   5. Fat stranding in the ventral pelvic wall near the midline below the   umbilicus may be related to prior surgery, but acute cellulitis should   be excluded clinically.                   This report was finalized on 5/12/2023 10:16 PM by Dr. Brian Campbell MD.                 Medication List      New Prescriptions    potassium chloride 10 MEQ CR tablet  Take 1 tablet by mouth Daily for 10 days.     traMADol 50 MG tablet  Commonly known as: ULTRAM  1 to 2 tablets p.o. every 6 hours as needed severe pain.           Where to Get Your Medications      These medications were sent to [facility] [id number] [place], [place] - [address] [facility] [address] AT [facility]  & [facility] - [phone]  - [phone]   [address] [address]    Phone: [phone]   · potassium chloride 10 MEQ CR tablet  · traMADol 50 MG tablet               Ramin Catherine MD  05/12/23 [id number]

## 2023-05-15 ENCOUNTER — OFFICE VISIT (OUTPATIENT)
Dept: OBSTETRICS AND GYNECOLOGY | Facility: CLINIC | Age: 43
End: 2023-05-15
Payer: MEDICARE

## 2023-05-15 VITALS
HEIGHT: 69 IN | BODY MASS INDEX: 23.88 KG/M2 | DIASTOLIC BLOOD PRESSURE: 82 MMHG | WEIGHT: 161.2 LBS | SYSTOLIC BLOOD PRESSURE: 126 MMHG

## 2023-05-15 DIAGNOSIS — Z13.89 SCREENING FOR GENITOURINARY CONDITION: Primary | ICD-10-CM

## 2023-05-15 DIAGNOSIS — Z09 POSTOP CHECK: ICD-10-CM

## 2023-05-15 DIAGNOSIS — Z90.721 S/P RIGHT OOPHORECTOMY: ICD-10-CM

## 2023-05-15 RX ORDER — HYOSCYAMINE SULFATE 0.125 MG
0.12 TABLET ORAL
COMMUNITY
Start: 2023-02-22

## 2023-05-15 RX ORDER — TETRACYCLINE HYDROCHLORIDE 500 MG/1
CAPSULE ORAL
COMMUNITY
Start: 2023-04-13

## 2023-05-15 NOTE — PROGRESS NOTES
"Surgical follow up visit     Aruna Hammonds is a 42 y.o. female who presents to the clinic 2 weeks status post Diagnostic Laparoscopy and RSO for adnexal mass Eating a regular diet without difficulty. Bowel movements are normal. Pain is controlled without any medications..  Vaginal bleeding is none. Path is B9. She still has abdominal pain and has a GI appt in July. She denies HF. I have encouraged her to get a MMG and an annual exam scheduled.     The following portions of the patient's history were reviewed and updated as appropriate: allergies, current medications, past family history, past medical history, past social history, past surgical history and problem list.    Review of Systems  Review of Systems   Constitutional: Positive for activity change, appetite change and unexpected weight change.   Gastrointestinal: Positive for abdominal distention and abdominal pain.   Endocrine: Negative for cold intolerance and heat intolerance.   Genitourinary: Negative for pelvic pain, vaginal discharge and vaginal pain.        Objective    /82   Ht 175.3 cm (69\")   Wt 73.1 kg (161 lb 3.2 oz)   BMI 23.81 kg/m²     Physical Exam  Vitals and nursing note reviewed.   Constitutional:       Appearance: She is well-developed.   HENT:      Head: Normocephalic and atraumatic.   Eyes:      General: No scleral icterus.     Conjunctiva/sclera: Conjunctivae normal.   Neck:      Thyroid: No thyromegaly.   Abdominal:      General: There is no distension.      Palpations: Abdomen is soft. There is no mass.      Tenderness: There is no abdominal tenderness. There is no guarding or rebound.      Hernia: No hernia is present.      Comments: Inc healing  Faint ecchymosis under umbilicus   Skin:     General: Skin is warm and dry.   Neurological:      Mental Status: She is alert and oriented to person, place, and time.   Psychiatric:         Mood and Affect: Mood normal.         Behavior: Behavior normal.         Thought Content: " Thought content normal.         Judgment: Judgment normal.         Assessment     1) Post op Diagnostic Laparoscopy and RSO- Doing well postoperatively. Path B9.  2) Operative findings again reviewed. Pathology report discussed.  Intraoperative photos were reviewed.   3) Enc MMG     Plan    1. Continue any current medications.  2. Wound care discussed.  3. Activity restrictions: none  4. Anticipated return to work: now.  5. Follow up: RTO 3 months annual exam.     Irene Aguilar MD     05/15/2023     16:34 EDT

## 2023-07-31 ENCOUNTER — HOSPITAL ENCOUNTER (EMERGENCY)
Facility: HOSPITAL | Age: 43
Discharge: HOME | End: 2023-07-31
Attending: EMERGENCY MEDICINE | Admitting: EMERGENCY MEDICINE
Payer: MEDICARE

## 2023-07-31 VITALS
TEMPERATURE: 99.1 F | SYSTOLIC BLOOD PRESSURE: 111 MMHG | BODY MASS INDEX: 19.55 KG/M2 | WEIGHT: 132 LBS | OXYGEN SATURATION: 100 % | RESPIRATION RATE: 16 BRPM | DIASTOLIC BLOOD PRESSURE: 68 MMHG | HEART RATE: 61 BPM | HEIGHT: 69 IN

## 2023-07-31 DIAGNOSIS — R10.9 CHRONIC ABDOMINAL PAIN: Primary | ICD-10-CM

## 2023-07-31 DIAGNOSIS — G89.29 CHRONIC ABDOMINAL PAIN: Primary | ICD-10-CM

## 2023-07-31 DIAGNOSIS — E87.6 HYPOKALEMIA: ICD-10-CM

## 2023-07-31 PROBLEM — R63.4 UNINTENTIONAL WEIGHT LOSS: Status: ACTIVE | Noted: 2023-07-31

## 2023-07-31 PROBLEM — R10.13 DYSPEPSIA: Status: ACTIVE | Noted: 2023-07-31

## 2023-07-31 PROBLEM — K29.70 HELICOBACTER PYLORI GASTRITIS: Status: ACTIVE | Noted: 2023-07-31

## 2023-07-31 PROBLEM — B96.81 HELICOBACTER PYLORI GASTRITIS: Status: ACTIVE | Noted: 2023-07-31

## 2023-07-31 PROBLEM — K21.9 GERD (GASTROESOPHAGEAL REFLUX DISEASE): Status: ACTIVE | Noted: 2023-07-31

## 2023-07-31 PROBLEM — R11.2 NAUSEA AND VOMITING: Status: ACTIVE | Noted: 2022-05-02

## 2023-07-31 LAB
ALBUMIN SERPL-MCNC: 3.6 G/DL (ref 3.5–5.2)
ALBUMIN/GLOB SERPL: 1.3 G/DL
ALP SERPL-CCNC: 94 U/L (ref 39–117)
ALT SERPL W P-5'-P-CCNC: 24 U/L (ref 1–33)
AMPHET+METHAMPHET UR QL: NEGATIVE
AMPHETAMINES UR QL: NEGATIVE
ANION GAP SERPL CALCULATED.3IONS-SCNC: 11.3 MMOL/L (ref 5–15)
AST SERPL-CCNC: 27 U/L (ref 1–32)
BARBITURATES UR QL SCN: NEGATIVE
BASOPHILS # BLD AUTO: 0.02 10*3/MM3 (ref 0–0.2)
BASOPHILS NFR BLD AUTO: 0.3 % (ref 0–1.5)
BENZODIAZ UR QL SCN: POSITIVE
BILIRUB SERPL-MCNC: 0.7 MG/DL (ref 0–1.2)
BILIRUB UR QL STRIP: NEGATIVE
BUN SERPL-MCNC: 3 MG/DL (ref 6–20)
BUN/CREAT SERPL: 4.1 (ref 7–25)
BUPRENORPHINE SERPL-MCNC: NEGATIVE NG/ML
CALCIUM SPEC-SCNC: 8.7 MG/DL (ref 8.6–10.5)
CANNABINOIDS SERPL QL: NEGATIVE
CHLORIDE SERPL-SCNC: 106 MMOL/L (ref 98–107)
CLARITY UR: CLEAR
CO2 SERPL-SCNC: 20.7 MMOL/L (ref 22–29)
COCAINE UR QL: NEGATIVE
COLOR UR: YELLOW
CREAT SERPL-MCNC: 0.73 MG/DL (ref 0.57–1)
DEPRECATED RDW RBC AUTO: 51.6 FL (ref 37–54)
EGFRCR SERPLBLD CKD-EPI 2021: 104.8 ML/MIN/1.73
EOSINOPHIL # BLD AUTO: 0.12 10*3/MM3 (ref 0–0.4)
EOSINOPHIL NFR BLD AUTO: 2 % (ref 0.3–6.2)
ERYTHROCYTE [DISTWIDTH] IN BLOOD BY AUTOMATED COUNT: 15.5 % (ref 12.3–15.4)
GLOBULIN UR ELPH-MCNC: 2.7 GM/DL
GLUCOSE SERPL-MCNC: 95 MG/DL (ref 65–99)
GLUCOSE UR STRIP-MCNC: NEGATIVE MG/DL
HCT VFR BLD AUTO: 35.6 % (ref 34–46.6)
HGB BLD-MCNC: 11.7 G/DL (ref 12–15.9)
HGB UR QL STRIP.AUTO: NEGATIVE
IMM GRANULOCYTES # BLD AUTO: 0.01 10*3/MM3 (ref 0–0.05)
IMM GRANULOCYTES NFR BLD AUTO: 0.2 % (ref 0–0.5)
KETONES UR QL STRIP: NEGATIVE
LEUKOCYTE ESTERASE UR QL STRIP.AUTO: NEGATIVE
LIPASE SERPL-CCNC: 63 U/L (ref 13–60)
LYMPHOCYTES # BLD AUTO: 2.43 10*3/MM3 (ref 0.7–3.1)
LYMPHOCYTES NFR BLD AUTO: 40.8 % (ref 19.6–45.3)
MAGNESIUM SERPL-MCNC: 1.9 MG/DL (ref 1.6–2.6)
MCH RBC QN AUTO: 30.4 PG (ref 26.6–33)
MCHC RBC AUTO-ENTMCNC: 32.9 G/DL (ref 31.5–35.7)
MCV RBC AUTO: 92.5 FL (ref 79–97)
METHADONE UR QL SCN: NEGATIVE
MONOCYTES # BLD AUTO: 0.58 10*3/MM3 (ref 0.1–0.9)
MONOCYTES NFR BLD AUTO: 9.7 % (ref 5–12)
NEUTROPHILS NFR BLD AUTO: 2.8 10*3/MM3 (ref 1.7–7)
NEUTROPHILS NFR BLD AUTO: 47 % (ref 42.7–76)
NITRITE UR QL STRIP: NEGATIVE
NRBC BLD AUTO-RTO: 0 /100 WBC (ref 0–0.2)
OPIATES UR QL: NEGATIVE
OXYCODONE UR QL SCN: NEGATIVE
PCP UR QL SCN: NEGATIVE
PH UR STRIP.AUTO: 6.5 [PH] (ref 4.5–8)
PLATELET # BLD AUTO: 176 10*3/MM3 (ref 140–450)
PMV BLD AUTO: 10.9 FL (ref 6–12)
POTASSIUM SERPL-SCNC: 3 MMOL/L (ref 3.5–5.2)
PROPOXYPH UR QL: NEGATIVE
PROT SERPL-MCNC: 6.3 G/DL (ref 6–8.5)
PROT UR QL STRIP: NEGATIVE
RBC # BLD AUTO: 3.85 10*6/MM3 (ref 3.77–5.28)
SODIUM SERPL-SCNC: 138 MMOL/L (ref 136–145)
SP GR UR STRIP: 1.02 (ref 1–1.03)
TRICYCLICS UR QL SCN: NEGATIVE
TSH SERPL DL<=0.05 MIU/L-ACNC: 6.47 UIU/ML (ref 0.27–4.2)
UROBILINOGEN UR QL STRIP: NORMAL
WBC NRBC COR # BLD: 5.96 10*3/MM3 (ref 3.4–10.8)

## 2023-07-31 PROCEDURE — 85025 COMPLETE CBC W/AUTO DIFF WBC: CPT | Performed by: EMERGENCY MEDICINE

## 2023-07-31 PROCEDURE — 83690 ASSAY OF LIPASE: CPT | Performed by: EMERGENCY MEDICINE

## 2023-07-31 PROCEDURE — 83735 ASSAY OF MAGNESIUM: CPT | Performed by: EMERGENCY MEDICINE

## 2023-07-31 PROCEDURE — 81003 URINALYSIS AUTO W/O SCOPE: CPT | Performed by: EMERGENCY MEDICINE

## 2023-07-31 PROCEDURE — 80306 DRUG TEST PRSMV INSTRMNT: CPT | Performed by: EMERGENCY MEDICINE

## 2023-07-31 PROCEDURE — 96374 THER/PROPH/DIAG INJ IV PUSH: CPT

## 2023-07-31 PROCEDURE — 99283 EMERGENCY DEPT VISIT LOW MDM: CPT

## 2023-07-31 PROCEDURE — 25010000002 ONDANSETRON PER 1 MG: Performed by: EMERGENCY MEDICINE

## 2023-07-31 PROCEDURE — 84443 ASSAY THYROID STIM HORMONE: CPT | Performed by: EMERGENCY MEDICINE

## 2023-07-31 PROCEDURE — 80053 COMPREHEN METABOLIC PANEL: CPT | Performed by: EMERGENCY MEDICINE

## 2023-07-31 RX ORDER — PANTOPRAZOLE SODIUM 40 MG/1
40 TABLET, DELAYED RELEASE ORAL DAILY
COMMUNITY
Start: 2023-07-17 | End: 2023-08-01

## 2023-07-31 RX ORDER — CLARITHROMYCIN 500 MG/1
500 TABLET, COATED ORAL 2 TIMES DAILY
COMMUNITY
Start: 2023-07-30 | End: 2023-08-01

## 2023-07-31 RX ORDER — ONDANSETRON 4 MG/1
4 TABLET, ORALLY DISINTEGRATING ORAL EVERY 8 HOURS PRN
COMMUNITY
Start: 2023-07-19 | End: 2023-08-01 | Stop reason: SDUPTHER

## 2023-07-31 RX ORDER — POTASSIUM CHLORIDE 20 MEQ/1
10 TABLET, EXTENDED RELEASE ORAL DAILY
Status: DISCONTINUED | OUTPATIENT
Start: 2023-07-31 | End: 2023-07-31

## 2023-07-31 RX ORDER — ONDANSETRON 2 MG/ML
4 INJECTION INTRAMUSCULAR; INTRAVENOUS ONCE
Status: COMPLETED | OUTPATIENT
Start: 2023-07-31 | End: 2023-07-31

## 2023-07-31 RX ORDER — POTASSIUM CHLORIDE 20 MEQ/1
40 TABLET, EXTENDED RELEASE ORAL ONCE
Status: DISCONTINUED | OUTPATIENT
Start: 2023-07-31 | End: 2023-07-31 | Stop reason: HOSPADM

## 2023-07-31 RX ORDER — ARIPIPRAZOLE 10 MG/1
10 TABLET ORAL DAILY
COMMUNITY
Start: 2023-07-28

## 2023-07-31 RX ORDER — AMOXICILLIN 500 MG/1
500 CAPSULE ORAL 2 TIMES DAILY
COMMUNITY
Start: 2023-07-30 | End: 2023-08-01

## 2023-07-31 RX ORDER — PROMETHAZINE HYDROCHLORIDE 25 MG/1
25 TABLET ORAL EVERY 6 HOURS PRN
COMMUNITY
Start: 2023-07-27 | End: 2023-08-01

## 2023-07-31 RX ADMIN — SODIUM CHLORIDE, POTASSIUM CHLORIDE, SODIUM LACTATE AND CALCIUM CHLORIDE 1000 ML: 600; 310; 30; 20 INJECTION, SOLUTION INTRAVENOUS at 01:40

## 2023-07-31 RX ADMIN — ONDANSETRON 4 MG: 2 INJECTION INTRAMUSCULAR; INTRAVENOUS at 01:43

## 2023-08-01 ENCOUNTER — OFFICE VISIT (OUTPATIENT)
Dept: GASTROENTEROLOGY | Facility: CLINIC | Age: 43
End: 2023-08-01
Payer: MEDICARE

## 2023-08-01 ENCOUNTER — TELEPHONE (OUTPATIENT)
Dept: GASTROENTEROLOGY | Facility: CLINIC | Age: 43
End: 2023-08-01

## 2023-08-01 ENCOUNTER — PATIENT ROUNDING (BHMG ONLY) (OUTPATIENT)
Dept: GASTROENTEROLOGY | Facility: CLINIC | Age: 43
End: 2023-08-01
Payer: MEDICARE

## 2023-08-01 VITALS
BODY MASS INDEX: 21.36 KG/M2 | HEIGHT: 69 IN | SYSTOLIC BLOOD PRESSURE: 128 MMHG | WEIGHT: 144.2 LBS | DIASTOLIC BLOOD PRESSURE: 78 MMHG

## 2023-08-01 DIAGNOSIS — K59.04 CHRONIC IDIOPATHIC CONSTIPATION: ICD-10-CM

## 2023-08-01 DIAGNOSIS — R63.4 UNINTENTIONAL WEIGHT LOSS: ICD-10-CM

## 2023-08-01 DIAGNOSIS — B96.81 HELICOBACTER PYLORI GASTRITIS: ICD-10-CM

## 2023-08-01 DIAGNOSIS — K92.0 HEMATEMESIS WITH NAUSEA: ICD-10-CM

## 2023-08-01 DIAGNOSIS — R10.13 DYSPEPSIA: Primary | ICD-10-CM

## 2023-08-01 DIAGNOSIS — K21.9 GASTROESOPHAGEAL REFLUX DISEASE, UNSPECIFIED WHETHER ESOPHAGITIS PRESENT: ICD-10-CM

## 2023-08-01 DIAGNOSIS — R11.2 NAUSEA AND VOMITING, UNSPECIFIED VOMITING TYPE: ICD-10-CM

## 2023-08-01 DIAGNOSIS — K29.70 HELICOBACTER PYLORI GASTRITIS: ICD-10-CM

## 2023-08-01 RX ORDER — POTASSIUM CHLORIDE 750 MG/1
CAPSULE, EXTENDED RELEASE ORAL
COMMUNITY
Start: 2023-06-21

## 2023-08-01 RX ORDER — OMEPRAZOLE 40 MG/1
40 CAPSULE, DELAYED RELEASE ORAL
Qty: 180 CAPSULE | Refills: 3 | Status: SHIPPED | OUTPATIENT
Start: 2023-08-01

## 2023-08-01 RX ORDER — ONDANSETRON 8 MG/1
8 TABLET, ORALLY DISINTEGRATING ORAL EVERY 8 HOURS PRN
Qty: 270 TABLET | Refills: 3 | Status: SHIPPED | OUTPATIENT
Start: 2023-08-01

## 2023-08-01 RX ORDER — PROMETHAZINE HYDROCHLORIDE 25 MG/1
25 SUPPOSITORY RECTAL EVERY 6 HOURS PRN
Qty: 360 SUPPOSITORY | Refills: 3 | Status: SHIPPED | OUTPATIENT
Start: 2023-08-01

## 2023-08-01 RX ORDER — PLECANATIDE 3 MG/1
3 TABLET ORAL DAILY
Qty: 30 TABLET | Refills: 11 | Status: SHIPPED | OUTPATIENT
Start: 2023-08-01

## 2023-08-01 NOTE — H&P (VIEW-ONLY)
Aruna Hammonds is a 43 y.o. female with a past medical history of H Pylori Gastritis, Chronic Idiopathic Constipation, GERD, s/p CCY, Psoriasis on Humira, Depression who presents for evaluation of   Chief Complaint   Patient presents with    Nausea    Vomiting    Constipation    Abdominal Pain     Hx: HUMPHREY       Subjective     # Persistent Nausea and Vomiting   # Unintentional Weight Loss   # Dyspepsia   # H Pylori Gastritis   # Hematemesis   - Ongoing symptoms for 6 months. Further described as persistent epigastric pain and N/V.   - Reports she had an episode of coffee ground emesis this morning.   - Has lost 150 lbs in 6 months unintentionally.   - Takes Ibuprofen q6h for the last 6 months for her abdominal pain.  - Seen in ER yesterday for these symptoms.   - Was seen by Dr. Patel with [facility] in [place], [place] on 7/21.   - CT A/P W on 7/21/23 was normal.   - Last EGD was in 5/2022 with Dr. Sahu. Op report not available but H Pylori Shanae test was positive. Patient was unable to tolerate initial antibiotic regimen due to metallic taste.   - Has PRN Phenergan 25 mg q6h and PRN Zofran 4 mg q8h but endorses questionable adherence since she vomits everything back up.   - No GET in EMR.   - Currently on Amoxicillin and Clarithromycin for presumed H Pylori gastritis.     # Chronic Idiopathic Constipation   - Denies taking any opioids.   - Reports typically one BM per week with straining that's been ongoing 6 months to 1 year.   - Not currently on a bowel regimen.   - Last colonoscopy in 5/2022 had a hyperplastic rectal polyp s/p removal.     # GERD   - Reports mild heartburn. Her predominant symptom is the epigastric pain and N/V.   - Adherent to Omeprazole 40 mg QD.             Past Medical History:   Diagnosis Date    Abdominal pain     pt states normal gastric emptying study, lost 40 lbs since 1/2023, being referred to GI    ADHD (attention deficit hyperactivity disorder)      Bipolar 1 disorder     Depression     Endometriosis     GERD (gastroesophageal reflux disease)     Hypertension     Hypothyroid     Migraines     Ovarian cyst     Psychosis     PTSD (post-traumatic stress disorder)          Current Outpatient Medications:     Humira 40 MG/0.8ML Prefilled Syringe Kit injection, Inject 0.8 mL under the skin into the appropriate area as directed Every 14 (Fourteen) Days., Disp: , Rfl:     omeprazole (priLOSEC) 40 MG capsule, Take 1 capsule by mouth 2 (Two) Times a Day Before Meals. Take dos, Disp: 180 capsule, Rfl: 3    ondansetron ODT (ZOFRAN-ODT) 8 MG disintegrating tablet, Place 1 tablet on the tongue Every 8 (Eight) Hours As Needed for Nausea or Vomiting., Disp: 270 tablet, Rfl: 3    ALPRAZolam (XANAX) 0.25 MG tablet, Take 1 tablet by mouth 2 (Two) Times a Day. Take dos (Patient not taking: Reported on 8/1/2023), Disp: , Rfl:     ARIPiprazole (ABILIFY) 10 MG tablet, Take 1 tablet by mouth Daily. (Patient not taking: Reported on 8/1/2023), Disp: , Rfl:     FLUoxetine (PROzac) 40 MG capsule, Take 1 capsule by mouth Daily. Take dos (Patient not taking: Reported on 8/1/2023), Disp: , Rfl:     ibuprofen (ADVIL,MOTRIN) 600 MG tablet, Take 1 tablet by mouth Every 6 (Six) Hours As Needed for Mild Pain. (Patient not taking: Reported on 8/1/2023), Disp: 30 tablet, Rfl: 0    levothyroxine (SYNTHROID, LEVOTHROID) 125 MCG tablet, Take 1 tablet by mouth Every Morning. Take dos (Patient not taking: Reported on 8/1/2023), Disp: , Rfl:     lithium (ESKALITH) 450 MG CR tablet, Take 2 tablets by mouth Every Night. (Patient not taking: Reported on 8/1/2023), Disp: , Rfl:     meclizine (ANTIVERT) 25 MG tablet, Take 1 tablet by mouth 3 (Three) Times a Day As Needed for Dizziness or Nausea. (Patient not taking: Reported on 8/1/2023), Disp: , Rfl:     Plecanatide (Trulance) 3 MG tablet, Take 1 tablet by mouth Daily., Disp: 30 tablet, Rfl: 11    potassium chloride (MICRO-K) 10 MEQ CR capsule, , Disp: ,  Rfl:     promethazine (PHENERGAN) 25 MG suppository, Insert 1 suppository into the rectum Every 6 (Six) Hours As Needed for Nausea or Vomiting., Disp: 360 suppository, Rfl: 3    topiramate (TOPAMAX) 100 MG tablet, Take 1 tablet by mouth 2 (Two) Times a Day. Take dos (Patient not taking: Reported on 2023), Disp: , Rfl:     Allergies   Allergen Reactions    Sulfa Antibiotics Unknown - Low Severity and Shortness Of Breath     As a child       Social History     Socioeconomic History    Marital status:    Tobacco Use    Smoking status: Former     Packs/day: 1.00     Years: 5.00     Pack years: 5.00     Types: Cigarettes     Quit date: 2022     Years since quittin.4    Smokeless tobacco: Never    Tobacco comments:     caff use   Vaping Use    Vaping Use: Never used   Substance and Sexual Activity    Alcohol use: No    Drug use: No    Sexual activity: Defer       Family History   Problem Relation Age of Onset    Heart disease Mother 44    Migraines Mother     Breast cancer Mother     Heart attack Mother 44    Heart disease Father 46    Hypertension Father     Colon cancer Father     Kidney disease Father     Heart attack Father 65    Stroke Father     Diabetes Father     Aneurysm Paternal Grandmother     Malig Hyperthermia Neg Hx        Objective     Vitals:    23 0818   BP: 128/78         23  0818   Weight: 65.4 kg (144 lb 3.2 oz)     Body mass index is 21.28 kg/mý.    Physical Exam  Constitutional:       Comments: Frail.  In mild distress.    HENT:      Head: Normocephalic and atraumatic.   Eyes:      Extraocular Movements: Extraocular movements intact.      Pupils: Pupils are equal, round, and reactive to light.   Cardiovascular:      Rate and Rhythm: Normal rate and regular rhythm.      Heart sounds: Normal heart sounds.   Pulmonary:      Effort: Pulmonary effort is normal.      Breath sounds: Normal breath sounds.   Abdominal:      General: Abdomen is flat. Bowel sounds are normal.  There is no distension.      Palpations: Abdomen is soft.      Tenderness: There is abdominal tenderness (moderate epigastric tenderness).   Neurological:      Mental Status: She is alert.   Psychiatric:         Mood and Affect: Mood normal.         Behavior: Behavior normal.       WBC   Date Value Ref Range Status   07/31/2023 5.96 3.40 - 10.80 10*3/mm3 Final   07/21/2023 7.9 3.7 - 10.3 x10(3)/mcL Final     RBC   Date Value Ref Range Status   07/31/2023 3.85 3.77 - 5.28 10*6/mm3 Final   07/21/2023 3.59 (L) 3.90 - 5.20 x10(6)/mcL Final     Hemoglobin   Date Value Ref Range Status   07/31/2023 11.7 (L) 12.0 - 15.9 g/dL Final   07/21/2023 11.0 (L) 11.2 - 15.7 g/dL Final     Hematocrit   Date Value Ref Range Status   07/31/2023 35.6 34.0 - 46.6 % Final   07/21/2023 32.8 (L) 34.0 - 45.0 % Final     MCV   Date Value Ref Range Status   07/31/2023 92.5 79.0 - 97.0 fL Final   07/21/2023 91.4 80.0 - 100.0 fL Final   04/26/2022 87.2 80.0 - 100.0 fL Final     MCH   Date Value Ref Range Status   07/31/2023 30.4 26.6 - 33.0 pg Final   07/21/2023 30.6 26.0 - 34.0 pg Final     MCHC   Date Value Ref Range Status   07/31/2023 32.9 31.5 - 35.7 g/dL Final   07/21/2023 33.5 30.7 - 35.5 g/dL Final     RDW   Date Value Ref Range Status   07/31/2023 15.5 (H) 12.3 - 15.4 % Final   07/21/2023 14.6 <=14.9 % Final     RDW-SD   Date Value Ref Range Status   07/31/2023 51.6 37.0 - 54.0 fl Final     MPV   Date Value Ref Range Status   07/31/2023 10.9 6.0 - 12.0 fL Final   07/21/2023 10.4 8.8 - 12.5 fL Final     Platelets   Date Value Ref Range Status   07/31/2023 176 140 - 450 10*3/mm3 Final   07/21/2023 192 155 - 369 x10(3)/mcL Final     Neutrophil Rel %   Date Value Ref Range Status   07/21/2023 73.3 % Final     Comment:     Neutrophils equals segs plus bands     Neutrophil %   Date Value Ref Range Status   07/31/2023 47.0 42.7 - 76.0 % Final     Lymphocyte Rel %   Date Value Ref Range Status   04/26/2022 30.6 15 - 50 % Final     Lymphocyte %    Date Value Ref Range Status   07/31/2023 40.8 19.6 - 45.3 % Final     Monocyte Rel %   Date Value Ref Range Status   04/26/2022 5.4 0 - 15 % Final     Monocyte %   Date Value Ref Range Status   07/31/2023 9.7 5.0 - 12.0 % Final     Eosinophil %   Date Value Ref Range Status   07/31/2023 2.0 0.3 - 6.2 % Final   07/21/2023 0.8 % Final     Basophil Rel %   Date Value Ref Range Status   07/21/2023 0.3 % Final     Basophil %   Date Value Ref Range Status   07/31/2023 0.3 0.0 - 1.5 % Final     Immature Grans %   Date Value Ref Range Status   07/31/2023 0.2 0.0 - 0.5 % Final   04/26/2022 0.2 0.0 - 1.0 % Final     Neutrophils Absolute   Date Value Ref Range Status   07/21/2023 5.8 1.6 - 6.1 x10(3)/mcL Final     Comment:     Neutrophils equals segs plus bands     Neutrophils, Absolute   Date Value Ref Range Status   07/31/2023 2.80 1.70 - 7.00 10*3/mm3 Final     Lymphocytes Absolute   Date Value Ref Range Status   07/21/2023 21.5 % Final   07/21/2023 1.7 1.2 - 3.9 x10(3)/mcL Final   04/26/2022 2.93 0.7 - 5.5 10*3/uL Final     Lymphocytes, Absolute   Date Value Ref Range Status   07/31/2023 2.43 0.70 - 3.10 10*3/mm3 Final     Monocytes Absolute   Date Value Ref Range Status   07/21/2023 0.3 0.3 - 0.9 x10(3)/mcL Final     Monocytes, Absolute   Date Value Ref Range Status   07/31/2023 0.58 0.10 - 0.90 10*3/mm3 Final     Eosinophils Absolute   Date Value Ref Range Status   07/21/2023 0.1 0.0 - 0.5 x10(3)/mcL Final   02/19/2023 0.1 0.0 - 0.6 x10(3)/ul Final   04/26/2022 0.20 0.0 - 0.8 10*3/uL Final     Eosinophils, Absolute   Date Value Ref Range Status   07/31/2023 0.12 0.00 - 0.40 10*3/mm3 Final     Basophils Absolute   Date Value Ref Range Status   07/21/2023 0.0 0.0 - 0.1 x10(3)/mcL Final     Basophils, Absolute   Date Value Ref Range Status   07/31/2023 0.02 0.00 - 0.20 10*3/mm3 Final     Immature Grans, Absolute   Date Value Ref Range Status   07/31/2023 0.01 0.00 - 0.05 10*3/mm3 Final   04/26/2022 0.02 0.00 - 0.10  10*3/uL Final     nRBC   Date Value Ref Range Status   07/31/2023 0.0 0.0 - 0.2 /100 WBC Final       Glucose   Date Value Ref Range Status   07/31/2023 95 65 - 99 mg/dL Final     Sodium   Date Value Ref Range Status   07/31/2023 138 136 - 145 mmol/L Final   05/29/2020 140 137 - 145 mmol/L Final     Potassium   Date Value Ref Range Status   07/31/2023 3.0 (L) 3.5 - 5.2 mmol/L Final   05/29/2020 3.6 3.5 - 5.1 mmol/L Final     CO2   Date Value Ref Range Status   07/31/2023 20.7 (L) 22.0 - 29.0 mmol/L Final     Total CO2   Date Value Ref Range Status   05/29/2020 20 (L) 22 - 30 mmol/L Final     Chloride   Date Value Ref Range Status   07/31/2023 106 98 - 107 mmol/L Final   05/29/2020 109 (H) 98 - 107 mmol/L Final     Anion Gap   Date Value Ref Range Status   07/31/2023 11.3 5.0 - 15.0 mmol/L Final     Creatinine   Date Value Ref Range Status   07/31/2023 0.73 0.57 - 1.00 mg/dL Final   05/29/2020 1.0 0.7 - 1.5 mg/dL Final     BUN   Date Value Ref Range Status   07/31/2023 3 (L) 6 - 20 mg/dL Final   05/29/2020 7 7 - 20 mg/dL Final     BUN/Creatinine Ratio   Date Value Ref Range Status   07/31/2023 4.1 (L) 7.0 - 25.0 Final   05/29/2020 7.0 RATIO Final     Calcium   Date Value Ref Range Status   07/31/2023 8.7 8.6 - 10.5 mg/dL Final   05/29/2020 10.0 8.4 - 10.2 mg/dL Final     eGFR Non  Amer   Date Value Ref Range Status   11/11/2021 88 >60 mL/min/1.73 Final     Alkaline Phosphatase   Date Value Ref Range Status   07/31/2023 94 39 - 117 U/L Final   02/28/2020 134 (H) 38 - 126 U/L Final     Total Protein   Date Value Ref Range Status   07/31/2023 6.3 6.0 - 8.5 g/dL Final   02/28/2020 7.9 6.3 - 8.2 g/dL Final     ALT (SGPT)   Date Value Ref Range Status   07/31/2023 24 1 - 33 U/L Final   02/28/2020 31 0 - 35 U/L Final     Comment:     If ALT testing ordered prior to 2359 on 11/16/19, please use reference range: Male 0-50, Female 0-35.  [facility] switched to a new ALT assay on 11/16/19 which yields  results 10 U/L lower than the old assay.     AST (SGOT)   Date Value Ref Range Status   07/31/2023 27 1 - 32 U/L Final   02/28/2020 33 15 - 46 U/L Final     Total Bilirubin   Date Value Ref Range Status   07/31/2023 0.7 0.0 - 1.2 mg/dL Final   02/28/2020 0.3 0.2 - 1.3 mg/dL Final     Albumin   Date Value Ref Range Status   07/31/2023 3.6 3.5 - 5.2 g/dL Final   02/28/2020 4.5 3.5 - 5.0 g/dL Final     Globulin   Date Value Ref Range Status   07/31/2023 2.7 gm/dL Final   02/28/2020 3.4 1.5 - 4.5 g/dL Final     A/G Ratio   Date Value Ref Range Status   02/28/2020 1.3 1.1 - 2.5 RATIO Final         Imaging Results (Last 7 Days)       ** No results found for the last 168 hours. **                   Assessment & Plan     Diagnoses and all orders for this visit:    1. Dyspepsia (Primary)  Assessment & Plan:  - Etiology likely 2/2 frequent NSAID use and/or H Pylori infection   - Increase to Omeprazole 40 mg BID   - Obtain GET   - Obtain H Pylori breath test   - EGD to further evaluate      Orders:  -     NM Gastric Emptying; Future  -     omeprazole (priLOSEC) 40 MG capsule; Take 1 capsule by mouth 2 (Two) Times a Day Before Meals. Take dos  Dispense: 180 capsule; Refill: 3  -     Case Request; Standing  -     Case Request    2. Helicobacter pylori gastritis  Assessment & Plan:  - Obtain H Pylori breath test   - Currently on Amoxicillin and Clarithromycin and daily PPI to treat     Orders:  -     H. Pylori Breath Test - Breath, Lung; Future  -     omeprazole (priLOSEC) 40 MG capsule; Take 1 capsule by mouth 2 (Two) Times a Day Before Meals. Take dos  Dispense: 180 capsule; Refill: 3  -     Case Request; Standing  -     Case Request    3. Nausea and vomiting, unspecified vomiting type  Assessment & Plan:  - Increase to ODT Zofran 8 mg q8h PRN   - Transition to Phenergan 25 mg q6h suppositories PRN   - Obtain GET  - Obtain H Pylori breath test     Orders:  -     NM Gastric Emptying; Future  -     promethazine (PHENERGAN) 25 MG  suppository; Insert 1 suppository into the rectum Every 6 (Six) Hours As Needed for Nausea or Vomiting.  Dispense: 360 suppository; Refill: 3  -     ondansetron ODT (ZOFRAN-ODT) 8 MG disintegrating tablet; Place 1 tablet on the tongue Every 8 (Eight) Hours As Needed for Nausea or Vomiting.  Dispense: 270 tablet; Refill: 3    4. Unintentional weight loss  Assessment & Plan:  - GET  - EGD to further evaluate   - Had recent CT A/P that had no findings concerning for pancreatic cancer   - C-scope in 2022 had one hyperplastic polyp s/p removal       5. Hematemesis with nausea  Assessment & Plan:  - Single episode the am of 8/1  - EGD to further evaluate     Orders:  -     Case Request; Standing  -     Case Request    6. Chronic idiopathic constipation  Assessment & Plan:  - Start Trulance 3 mg QD    Orders:  -     Plecanatide (Trulance) 3 MG tablet; Take 1 tablet by mouth Daily.  Dispense: 30 tablet; Refill: 11    7. Gastroesophageal reflux disease, unspecified whether esophagitis present  Assessment & Plan:  Increase to PPI BID as stated above       Other orders  -     Obtain Informed Consent; Standing  -     Follow Anesthesia Guidelines / Protocol; Future        I have discussed the above plan with the patient.  They verbalize understanding and are in agreement with the plan.  They have been advised to contact the office for any questions, concerns, or changes related to their health.

## 2023-08-02 ENCOUNTER — TELEPHONE (OUTPATIENT)
Dept: GASTROENTEROLOGY | Facility: CLINIC | Age: 43
End: 2023-08-02
Payer: MEDICARE

## 2023-08-03 ENCOUNTER — ANESTHESIA EVENT (OUTPATIENT)
Dept: PERIOP | Facility: HOSPITAL | Age: 43
End: 2023-08-03
Payer: MEDICARE

## 2023-08-04 ENCOUNTER — HOSPITAL ENCOUNTER (OUTPATIENT)
Facility: HOSPITAL | Age: 43
Setting detail: HOSPITAL OUTPATIENT SURGERY
Discharge: HOME | End: 2023-08-04
Attending: STUDENT IN AN ORGANIZED HEALTH CARE EDUCATION/TRAINING PROGRAM | Admitting: STUDENT IN AN ORGANIZED HEALTH CARE EDUCATION/TRAINING PROGRAM
Payer: MEDICARE

## 2023-08-04 ENCOUNTER — PATIENT ROUNDING (BHMG ONLY) (OUTPATIENT)
Dept: GASTROENTEROLOGY | Facility: CLINIC | Age: 43
End: 2023-08-04
Payer: MEDICARE

## 2023-08-04 ENCOUNTER — ANESTHESIA (OUTPATIENT)
Dept: PERIOP | Facility: HOSPITAL | Age: 43
End: 2023-08-04
Payer: MEDICARE

## 2023-08-04 VITALS
OXYGEN SATURATION: 100 % | RESPIRATION RATE: 18 BRPM | WEIGHT: 140 LBS | BODY MASS INDEX: 20.73 KG/M2 | HEIGHT: 69 IN | DIASTOLIC BLOOD PRESSURE: 64 MMHG | HEART RATE: 66 BPM | SYSTOLIC BLOOD PRESSURE: 107 MMHG | TEMPERATURE: 97.7 F

## 2023-08-04 DIAGNOSIS — B96.81 HELICOBACTER PYLORI GASTRITIS: ICD-10-CM

## 2023-08-04 DIAGNOSIS — R10.13 DYSPEPSIA: ICD-10-CM

## 2023-08-04 DIAGNOSIS — K29.70 HELICOBACTER PYLORI GASTRITIS: ICD-10-CM

## 2023-08-04 DIAGNOSIS — K92.0 HEMATEMESIS WITH NAUSEA: ICD-10-CM

## 2023-08-04 PROCEDURE — 88342 IMHCHEM/IMCYTCHM 1ST ANTB: CPT | Performed by: STUDENT IN AN ORGANIZED HEALTH CARE EDUCATION/TRAINING PROGRAM

## 2023-08-04 PROCEDURE — 88305 TISSUE EXAM BY PATHOLOGIST: CPT | Performed by: STUDENT IN AN ORGANIZED HEALTH CARE EDUCATION/TRAINING PROGRAM

## 2023-08-04 PROCEDURE — 25010000002 PROPOFOL 200 MG/20ML EMULSION: Performed by: REGISTERED NURSE

## 2023-08-04 PROCEDURE — 43239 EGD BIOPSY SINGLE/MULTIPLE: CPT | Performed by: STUDENT IN AN ORGANIZED HEALTH CARE EDUCATION/TRAINING PROGRAM

## 2023-08-04 RX ORDER — PROPOFOL 10 MG/ML
INJECTION, EMULSION INTRAVENOUS AS NEEDED
Status: DISCONTINUED | OUTPATIENT
Start: 2023-08-04 | End: 2023-08-04 | Stop reason: SURG

## 2023-08-04 RX ORDER — SODIUM CHLORIDE 9 MG/ML
40 INJECTION, SOLUTION INTRAVENOUS AS NEEDED
Status: DISCONTINUED | OUTPATIENT
Start: 2023-08-04 | End: 2023-08-04 | Stop reason: HOSPADM

## 2023-08-04 RX ORDER — SODIUM CHLORIDE, SODIUM LACTATE, POTASSIUM CHLORIDE, CALCIUM CHLORIDE 600; 310; 30; 20 MG/100ML; MG/100ML; MG/100ML; MG/100ML
9 INJECTION, SOLUTION INTRAVENOUS CONTINUOUS
Status: DISCONTINUED | OUTPATIENT
Start: 2023-08-04 | End: 2023-08-04 | Stop reason: HOSPADM

## 2023-08-04 RX ORDER — LIDOCAINE HYDROCHLORIDE 20 MG/ML
INJECTION, SOLUTION INFILTRATION; PERINEURAL AS NEEDED
Status: DISCONTINUED | OUTPATIENT
Start: 2023-08-04 | End: 2023-08-04 | Stop reason: SURG

## 2023-08-04 RX ORDER — MAGNESIUM HYDROXIDE 1200 MG/15ML
LIQUID ORAL AS NEEDED
Status: DISCONTINUED | OUTPATIENT
Start: 2023-08-04 | End: 2023-08-04 | Stop reason: HOSPADM

## 2023-08-04 RX ORDER — SODIUM CHLORIDE 0.9 % (FLUSH) 0.9 %
10 SYRINGE (ML) INJECTION AS NEEDED
Status: DISCONTINUED | OUTPATIENT
Start: 2023-08-04 | End: 2023-08-04 | Stop reason: HOSPADM

## 2023-08-04 RX ORDER — SODIUM CHLORIDE, SODIUM LACTATE, POTASSIUM CHLORIDE, CALCIUM CHLORIDE 600; 310; 30; 20 MG/100ML; MG/100ML; MG/100ML; MG/100ML
100 INJECTION, SOLUTION INTRAVENOUS CONTINUOUS
Status: DISCONTINUED | OUTPATIENT
Start: 2023-08-04 | End: 2023-08-04 | Stop reason: HOSPADM

## 2023-08-04 RX ORDER — ONDANSETRON 2 MG/ML
4 INJECTION INTRAMUSCULAR; INTRAVENOUS ONCE AS NEEDED
Status: DISCONTINUED | OUTPATIENT
Start: 2023-08-04 | End: 2023-08-04 | Stop reason: HOSPADM

## 2023-08-04 RX ORDER — MEPERIDINE HYDROCHLORIDE 25 MG/ML
12.5 INJECTION INTRAMUSCULAR; INTRAVENOUS; SUBCUTANEOUS
Status: DISCONTINUED | OUTPATIENT
Start: 2023-08-04 | End: 2023-08-04 | Stop reason: HOSPADM

## 2023-08-04 RX ORDER — SODIUM CHLORIDE 0.9 % (FLUSH) 0.9 %
10 SYRINGE (ML) INJECTION EVERY 12 HOURS SCHEDULED
Status: DISCONTINUED | OUTPATIENT
Start: 2023-08-04 | End: 2023-08-04 | Stop reason: HOSPADM

## 2023-08-04 RX ADMIN — LIDOCAINE HYDROCHLORIDE 100 MG: 20 INJECTION, SOLUTION INFILTRATION; PERINEURAL at 08:57

## 2023-08-04 RX ADMIN — PROPOFOL INJECTABLE EMULSION 50 MG: 10 INJECTION, EMULSION INTRAVENOUS at 09:00

## 2023-08-04 RX ADMIN — PROPOFOL INJECTABLE EMULSION 50 MG: 10 INJECTION, EMULSION INTRAVENOUS at 08:59

## 2023-08-04 RX ADMIN — LIDOCAINE HYDROCHLORIDE 50 MG: 20 INJECTION, SOLUTION INFILTRATION; PERINEURAL at 08:54

## 2023-08-04 RX ADMIN — PROPOFOL INJECTABLE EMULSION 100 MG: 10 INJECTION, EMULSION INTRAVENOUS at 08:56

## 2023-08-04 RX ADMIN — SODIUM CHLORIDE, POTASSIUM CHLORIDE, SODIUM LACTATE AND CALCIUM CHLORIDE 9 ML/HR: 600; 310; 30; 20 INJECTION, SOLUTION INTRAVENOUS at 08:18

## 2023-08-04 RX ADMIN — PROPOFOL INJECTABLE EMULSION 50 MG: 10 INJECTION, EMULSION INTRAVENOUS at 08:54

## 2023-08-04 NOTE — INTERVAL H&P NOTE
"  H&P reviewed. The patient was examined and there are no changes to the H&P.      /66 (BP Location: Left arm, Patient Position: Lying)   Pulse 69   Temp 97.7 øF (36.5 øC) (Oral)   Resp 20   Ht 175.3 cm (69\")   Wt 63.5 kg (140 lb)   SpO2 99%   BMI 20.67 kg/mý        "

## 2023-08-07 ENCOUNTER — TELEPHONE (OUTPATIENT)
Dept: GASTROENTEROLOGY | Facility: CLINIC | Age: 43
End: 2023-08-07
Payer: MEDICARE

## 2023-08-07 NOTE — TELEPHONE ENCOUNTER
Attempted to reach pt recording-requesting mail box number?    If pt calls back need to confirm if taking Trulance samples as ordered. Pt needs to increase fluids, what her temp has been running.

## 2023-08-07 NOTE — TELEPHONE ENCOUNTER
LOV: 08/01/23  EGD: 08/04/23- normal esophagus/gastritis/awaiting path results    Plan for GES not scheduled yet  Pt to take Trulance has samples-   Insurance denied coverage will need to complete pt assistance

## 2023-08-07 NOTE — TELEPHONE ENCOUNTER
PT was given 4 boxes of samples : 28 days worth and packet to complete assistance program during office visit. Will call pt to instruct completing PAPA and mail back to assistance for coverage.    Currently waiting on EGD path as well

## 2023-08-07 NOTE — TELEPHONE ENCOUNTER
PT CALLED STATING SHE WAS HAVING A LOT PAIN SINCE HER PROCEDURE WITH DR DAWKINS ON FRIDAY.    HER STOMACH IS VERY SORE, BODY ACHES, HURTING ALL OVER SHE SAID, STATED SHE CAN'T HAVE A BOWEL MOVEMENT, AND IF SHE DOES, ITS SMALL LITTLE BALLS.    SHE DIDN'T WANT TO GO TO ER. SAID THEY WOULDN'T DO ANYTHING FOR HER. SHE MADE AN APPT TO BE SEEN WITH DR DAWKINS HERE TOMORROW AT 3

## 2023-08-07 NOTE — TELEPHONE ENCOUNTER
PT SAID HER PHARM (NAOM[facility]) TOLD HER TO CALL THE OFFICE BECAUSE HER INSURANCE WASN'T GOING TO COVER HER PRESCRIPTION FOR     Plecanatide (Trulance) 3 MG tablet [[id number]] (Order [id number])       SAID NEEDED A PA, BUT THEY TOLD HER SHE SHOULDN'T BOTHER WITH THAT SINCE IT WOULDN'T HELP. SHE DIDN'T KNOW WHY OR UNDERSTAND WHAT THEY WERE TELLING HER.    SHE WOULD LIKE TO SEE WE HAVE ANY SAMPLES OR CAN WE PRESCRIBE A DIFFERENT MED?    PLEASE CALL HER AT [phone]

## 2023-08-08 ENCOUNTER — OFFICE VISIT (OUTPATIENT)
Dept: GASTROENTEROLOGY | Facility: CLINIC | Age: 43
End: 2023-08-08
Payer: MEDICARE

## 2023-08-08 VITALS
BODY MASS INDEX: 20.97 KG/M2 | DIASTOLIC BLOOD PRESSURE: 80 MMHG | SYSTOLIC BLOOD PRESSURE: 118 MMHG | HEIGHT: 69 IN | WEIGHT: 141.6 LBS

## 2023-08-08 DIAGNOSIS — R10.13 DYSPEPSIA: ICD-10-CM

## 2023-08-08 DIAGNOSIS — F31.60 BIPOLAR AFFECTIVE DISORDER, CURRENT EPISODE MIXED, CURRENT EPISODE SEVERITY UNSPECIFIED: ICD-10-CM

## 2023-08-08 DIAGNOSIS — K59.04 CHRONIC IDIOPATHIC CONSTIPATION: Primary | ICD-10-CM

## 2023-08-08 LAB
LAB AP CASE REPORT: NORMAL
LAB AP CLINICAL INFORMATION: NORMAL
LAB AP DIAGNOSIS COMMENT: NORMAL
PATH REPORT.FINAL DX SPEC: NORMAL
PATH REPORT.GROSS SPEC: NORMAL

## 2023-08-08 RX ORDER — MINERAL OIL 100 G/100G
1 OIL RECTAL DAILY PRN
Qty: 10 EACH | Refills: 3 | Status: SHIPPED | OUTPATIENT
Start: 2023-08-08

## 2023-08-08 RX ORDER — AMITRIPTYLINE HYDROCHLORIDE 25 MG/1
25 TABLET, FILM COATED ORAL NIGHTLY
Qty: 90 TABLET | Refills: 3 | Status: SHIPPED | OUTPATIENT
Start: 2023-08-08

## 2023-08-08 NOTE — ASSESSMENT & PLAN NOTE
- Off Lithium and Prozac for ~ 3 months  - Amenable to being seen by Psychiatry for further management, will refer

## 2023-08-08 NOTE — ASSESSMENT & PLAN NOTE
- Continue Trulance 3 mg QD. Counseled to take daily and fill out paperwork for assistance program.   - Start PRN Smog enemas  - KUB to assess for colonic stool burden

## 2023-08-08 NOTE — PROGRESS NOTES
Aruna Hammonds is a 43 y.o. female with a past medical history of H Pylori Gastritis, Chronic Idiopathic Constipation, GERD, s/p CCY, Psoriasis on Humira, Bipolar DO who presents with   Chief Complaint   Patient presents with    Constipation    Abdominal Pain     Mid abdominal       Subjective     # Epigastric Pain   # Chronic Idiopathic Constipation   - Reports worsening of epigastric pain following EGD on .   - Also endorsed persistent constipation. Endorses starting the samples of Trulance 3 mg QD but was taking every other day instead of daily. Reports having one small bowel movement with mild improvement.   - Reports no improvement with Linzess when previously prescribed.   - Discussed recent EGD that showed mild gastritis and path was negative for H Pylori.   - Still awaiting GET.     # Bipolar Disorder  - Reports that she stopped taking Lithium and Prozac 3 months ago. Is currently not seeing a Psychiatrist to further manage her medication regimen but is amenable to seeing.         Past Medical History:   Diagnosis Date    Abdominal pain     pt states normal gastric emptying study, lost 40 lbs since 2023, being referred to GI    ADHD (attention deficit hyperactivity disorder)     Bipolar 1 disorder     Depression     Endometriosis     GERD (gastroesophageal reflux disease)     Hypertension     Hypothyroid     Migraines     Ovarian cyst     Psychosis     PTSD (post-traumatic stress disorder)        Social History     Socioeconomic History    Marital status:    Tobacco Use    Smoking status: Former     Packs/day: 1.00     Years: 5.00     Pack years: 5.00     Types: Cigarettes     Quit date: 2022     Years since quittin.4    Smokeless tobacco: Never    Tobacco comments:     caff use   Vaping Use    Vaping Use: Never used   Substance and Sexual Activity    Alcohol use: No    Drug use: No    Sexual activity: Defer         Current Outpatient Medications:     ALPRAZolam (XANAX) 0.25 MG  tablet, Take 1 tablet by mouth 2 (Two) Times a Day. Take dos, Disp: , Rfl:     ARIPiprazole (ABILIFY) 10 MG tablet, Take 1 tablet by mouth Daily., Disp: , Rfl:     FLUoxetine (PROzac) 40 MG capsule, Take 1 capsule by mouth Daily. Take dos, Disp: , Rfl:     Humira 40 MG/0.8ML Prefilled Syringe Kit injection, Inject 0.8 mL under the skin into the appropriate area as directed Every 14 (Fourteen) Days., Disp: , Rfl:     levothyroxine (SYNTHROID, LEVOTHROID) 125 MCG tablet, Take 1 tablet by mouth Every Morning. Take dos, Disp: , Rfl:     lithium (ESKALITH) 450 MG CR tablet, Take 2 tablets by mouth Every Night., Disp: , Rfl:     omeprazole (priLOSEC) 40 MG capsule, Take 1 capsule by mouth 2 (Two) Times a Day Before Meals. Take dos, Disp: 180 capsule, Rfl: 3    ondansetron ODT (ZOFRAN-ODT) 8 MG disintegrating tablet, Place 1 tablet on the tongue Every 8 (Eight) Hours As Needed for Nausea or Vomiting., Disp: 270 tablet, Rfl: 3    potassium chloride (MICRO-K) 10 MEQ CR capsule, , Disp: , Rfl:     promethazine (PHENERGAN) 25 MG suppository, Insert 1 suppository into the rectum Every 6 (Six) Hours As Needed for Nausea or Vomiting., Disp: 360 suppository, Rfl: 3    topiramate (TOPAMAX) 100 MG tablet, Take 1 tablet by mouth 2 (Two) Times a Day. Take dos, Disp: , Rfl:     amitriptyline (ELAVIL) 25 MG tablet, Take 1 tablet by mouth Every Night., Disp: 90 tablet, Rfl: 3    mineral oil enema, Insert 1 each into the rectum Daily As Needed for Constipation., Disp: 10 each, Rfl: 3    Plecanatide (Trulance) 3 MG tablet, Take 1 tablet by mouth Daily. (Patient not taking: Reported on 8/8/2023), Disp: 30 tablet, Rfl: 11    Objective   Vitals:    08/08/23 1447   BP: 118/80         08/08/23  1447   Weight: 64.2 kg (141 lb 9.6 oz)     Body mass index is 20.91 kg/mý.      Physical Exam  Constitutional:       Appearance: Normal appearance.   HENT:      Head: Normocephalic and atraumatic.   Eyes:      Extraocular Movements: Extraocular movements  intact.      Pupils: Pupils are equal, round, and reactive to light.   Cardiovascular:      Rate and Rhythm: Normal rate and regular rhythm.      Heart sounds: Normal heart sounds.   Pulmonary:      Effort: Pulmonary effort is normal.      Breath sounds: Normal breath sounds.   Abdominal:      General: Abdomen is flat. Bowel sounds are normal. There is no distension.      Palpations: Abdomen is soft.      Tenderness: There is abdominal tenderness (mild to moderate diffuse tenderness). There is no guarding.   Neurological:      Mental Status: She is alert and oriented to person, place, and time.       WBC   Date Value Ref Range Status   07/31/2023 5.96 3.40 - 10.80 10*3/mm3 Final   07/21/2023 7.9 3.7 - 10.3 x10(3)/mcL Final     RBC   Date Value Ref Range Status   07/31/2023 3.85 3.77 - 5.28 10*6/mm3 Final   07/21/2023 3.59 (L) 3.90 - 5.20 x10(6)/mcL Final     Hemoglobin   Date Value Ref Range Status   07/31/2023 11.7 (L) 12.0 - 15.9 g/dL Final   07/21/2023 11.0 (L) 11.2 - 15.7 g/dL Final     Hematocrit   Date Value Ref Range Status   07/31/2023 35.6 34.0 - 46.6 % Final   07/21/2023 32.8 (L) 34.0 - 45.0 % Final     MCV   Date Value Ref Range Status   07/31/2023 92.5 79.0 - 97.0 fL Final   07/21/2023 91.4 80.0 - 100.0 fL Final   04/26/2022 87.2 80.0 - 100.0 fL Final     MCH   Date Value Ref Range Status   07/31/2023 30.4 26.6 - 33.0 pg Final   07/21/2023 30.6 26.0 - 34.0 pg Final     MCHC   Date Value Ref Range Status   07/31/2023 32.9 31.5 - 35.7 g/dL Final   07/21/2023 33.5 30.7 - 35.5 g/dL Final     RDW   Date Value Ref Range Status   07/31/2023 15.5 (H) 12.3 - 15.4 % Final   07/21/2023 14.6 <=14.9 % Final     RDW-SD   Date Value Ref Range Status   07/31/2023 51.6 37.0 - 54.0 fl Final     MPV   Date Value Ref Range Status   07/31/2023 10.9 6.0 - 12.0 fL Final   07/21/2023 10.4 8.8 - 12.5 fL Final     Platelets   Date Value Ref Range Status   07/31/2023 176 140 - 450 10*3/mm3 Final   07/21/2023 192 155 - 369  x10(3)/mcL Final     Neutrophil Rel %   Date Value Ref Range Status   07/21/2023 73.3 % Final     Comment:     Neutrophils equals segs plus bands     Neutrophil %   Date Value Ref Range Status   07/31/2023 47.0 42.7 - 76.0 % Final     Lymphocyte Rel %   Date Value Ref Range Status   04/26/2022 30.6 15 - 50 % Final     Lymphocyte %   Date Value Ref Range Status   07/31/2023 40.8 19.6 - 45.3 % Final     Monocyte Rel %   Date Value Ref Range Status   04/26/2022 5.4 0 - 15 % Final     Monocyte %   Date Value Ref Range Status   07/31/2023 9.7 5.0 - 12.0 % Final     Eosinophil %   Date Value Ref Range Status   07/31/2023 2.0 0.3 - 6.2 % Final   07/21/2023 0.8 % Final     Basophil Rel %   Date Value Ref Range Status   07/21/2023 0.3 % Final     Basophil %   Date Value Ref Range Status   07/31/2023 0.3 0.0 - 1.5 % Final     Immature Grans %   Date Value Ref Range Status   07/31/2023 0.2 0.0 - 0.5 % Final   04/26/2022 0.2 0.0 - 1.0 % Final     Neutrophils Absolute   Date Value Ref Range Status   07/21/2023 5.8 1.6 - 6.1 x10(3)/mcL Final     Comment:     Neutrophils equals segs plus bands     Neutrophils, Absolute   Date Value Ref Range Status   07/31/2023 2.80 1.70 - 7.00 10*3/mm3 Final     Lymphocytes Absolute   Date Value Ref Range Status   07/21/2023 21.5 % Final   07/21/2023 1.7 1.2 - 3.9 x10(3)/mcL Final   04/26/2022 2.93 0.7 - 5.5 10*3/uL Final     Lymphocytes, Absolute   Date Value Ref Range Status   07/31/2023 2.43 0.70 - 3.10 10*3/mm3 Final     Monocytes Absolute   Date Value Ref Range Status   07/21/2023 0.3 0.3 - 0.9 x10(3)/mcL Final     Monocytes, Absolute   Date Value Ref Range Status   07/31/2023 0.58 0.10 - 0.90 10*3/mm3 Final     Eosinophils Absolute   Date Value Ref Range Status   07/21/2023 0.1 0.0 - 0.5 x10(3)/mcL Final   02/19/2023 0.1 0.0 - 0.6 x10(3)/ul Final   04/26/2022 0.20 0.0 - 0.8 10*3/uL Final     Eosinophils, Absolute   Date Value Ref Range Status   07/31/2023 0.12 0.00 - 0.40 10*3/mm3 Final      Basophils Absolute   Date Value Ref Range Status   07/21/2023 0.0 0.0 - 0.1 x10(3)/mcL Final     Basophils, Absolute   Date Value Ref Range Status   07/31/2023 0.02 0.00 - 0.20 10*3/mm3 Final     Immature Grans, Absolute   Date Value Ref Range Status   07/31/2023 0.01 0.00 - 0.05 10*3/mm3 Final   04/26/2022 0.02 0.00 - 0.10 10*3/uL Final     nRBC   Date Value Ref Range Status   07/31/2023 0.0 0.0 - 0.2 /100 WBC Final       Lab Results   Component Value Date    GLUCOSE 95 07/31/2023    BUN 3 (L) 07/31/2023    CREATININE 0.73 07/31/2023    EGFRIFNONA 88 11/11/2021    BCR 4.1 (L) 07/31/2023    CO2 20.7 (L) 07/31/2023    CALCIUM 8.7 07/31/2023    ALBUMIN 3.6 07/31/2023    LABIL2 1.3 02/28/2020    AST 27 07/31/2023    ALT 24 07/31/2023         Imaging Results (Last 7 Days)       ** No results found for the last 168 hours. **              Assessment & Plan   Diagnoses and all orders for this visit:    1. Chronic idiopathic constipation (Primary)  Assessment & Plan:  - Continue Trulance 3 mg QD. Counseled to take daily and fill out paperwork for assistance program.   - Start PRN Smog enemas  - KUB to assess for colonic stool burden       Orders:  -     XR Abdomen KUB; Future  -     mineral oil enema; Insert 1 each into the rectum Daily As Needed for Constipation.  Dispense: 10 each; Refill: 3    2. Dyspepsia  Assessment & Plan:  - Start Elavil 25 mg QHS     Orders:  -     amitriptyline (ELAVIL) 25 MG tablet; Take 1 tablet by mouth Every Night.  Dispense: 90 tablet; Refill: 3    3. Bipolar affective disorder, current episode mixed, current episode severity unspecified  Assessment & Plan:  - Off Lithium and Prozac for ~ 3 months  - Amenable to being seen by Psychiatry for further management, will refer     Orders:  -     Ambulatory Referral to Psychiatry        I have discussed the above plan with the patient.  They verbalize understanding and are in agreement with the plan.  They have been advised to contact the office  for any questions, concerns, or changes related to their health.

## 2023-08-09 ENCOUNTER — TELEPHONE (OUTPATIENT)
Dept: GASTROENTEROLOGY | Facility: CLINIC | Age: 43
End: 2023-08-09
Payer: MEDICARE

## 2023-08-09 NOTE — TELEPHONE ENCOUNTER
Patient hasn't been seen since 2019.  Referral has been sent to [facility].

## 2023-08-09 NOTE — TELEPHONE ENCOUNTER
Dr. Levine placed order for follow up with psychiatry    Pt malia taylor LOV 07/25/16    Jaciel Brian Jr.  Attending Provider  Psychiatry  NPI: [id number]      Per Dr. Pandya  note on 04/19/2023- states sees Dr. Dumont at [phone]    Upon review Dr. Suzanne Dumont

## 2023-08-09 NOTE — TELEPHONE ENCOUNTER
LVM for pt to call back    On pt assistance form listed 2 in house hold must have total income for both ( will need proof) W2 etc

## 2023-08-15 NOTE — PROGRESS NOTES
[facility]  [address]  [facility] [address] [phone].    Before we get started may I verify your date of birth? 1980    I am calling to officially welcome you to our practice and ask about your recent visit. Is this a good time to talk? yes    Tell me about your visit with us. What things went well?  Good       We're always looking for ways to make our patients' experiences even better. Do you have recommendations on ways we may improve?  no    Overall were you satisfied with your first visit to our practice? yes       I appreciate you taking the time to speak with me today. Is there anything else I can do for you? no      Thank you, and have a great day.

## 2023-08-15 NOTE — TELEPHONE ENCOUNTER
Trulance PAPA faxed on 08/10/23    Called [facility] for update:unable to reach rep

## 2023-08-23 NOTE — TELEPHONE ENCOUNTER
Called pt updated on will need to call social security office to obtain a letter of proof of income.     Pt currently in hospital will call to schedule follow appt with Dr. Levine as well.

## 2023-08-28 NOTE — PROGRESS NOTES
[facility]  [address]  [facility] [address] [phone].    Before we get started may I verify your date of birth? 1980    I am calling to officially welcome you to our practice and ask about your recent visit. Is this a good time to talk? yes    Tell me about your visit with us. What things went well?  Everything       We're always looking for ways to make our patients' experiences even better. Do you have recommendations on ways we may improve?  no    Overall were you satisfied with your first visit to our practice? yes       I appreciate you taking the time to speak with me today. Is there anything else I can do for you? no      Thank you, and have a great day.

## 2023-08-31 ENCOUNTER — TELEPHONE (OUTPATIENT)
Dept: OBSTETRICS AND GYNECOLOGY | Facility: CLINIC | Age: 43
End: 2023-08-31

## 2023-08-31 DIAGNOSIS — R92.8 ABNORMAL MAMMOGRAM: Primary | ICD-10-CM

## 2023-08-31 NOTE — TELEPHONE ENCOUNTER
PATIENT STATED THAT MAMMOGRAM ORDER WAS SENT IN BUT SHE NEEDS A DIAGNOSTIC MAMMOGRAM ORDER SENT IN. Can you Order this ? Dr MATTHEW out of the office.

## 2023-08-31 NOTE — TELEPHONE ENCOUNTER
Caller: Aruna Hammonds    Relationship: Self    Best call back number: [phone]    What orders are you requesting (i.e. lab or imaging): DIAGNOSTIC MAMMOGRAM    In what timeframe would the patient need to come in: ASAP    Where will you receive your lab/imaging services: [facility]    Additional notes: PATIENT STATED THAT MAMMOGRAM ORDER WAS SENT IN BUT SHE NEEDS A DIAGNOSTIC MAMMOGRAM ORDER SENT IN. WILLING TO GO TO WHOEVER HAS THE SOONEST APPT. PLEASE ADVISE.    OK TO CALL ANYTIME  OK TO [facility]

## 2023-08-31 NOTE — TELEPHONE ENCOUNTER
Pt called back, she states Dr Gonzalez could not see her until end of Sept and she was told that he would send us a order if you would put it in.

## 2023-09-05 ENCOUNTER — TRANSCRIBE ORDERS (OUTPATIENT)
Dept: ULTRASOUND IMAGING | Facility: HOSPITAL | Age: 43
End: 2023-09-05
Payer: MEDICARE

## 2023-09-05 DIAGNOSIS — N63.32 UNSPECIFIED LUMP IN AXILLARY TAIL OF THE LEFT BREAST: ICD-10-CM

## 2023-09-05 DIAGNOSIS — N63.31 UNSPECIFIED LUMP IN AXILLARY TAIL OF THE RIGHT BREAST: Primary | ICD-10-CM

## 2023-10-03 ENCOUNTER — LAB (OUTPATIENT)
Dept: LAB | Facility: HOSPITAL | Age: 43
End: 2023-10-03
Payer: MEDICARE

## 2023-10-03 ENCOUNTER — OFFICE VISIT (OUTPATIENT)
Dept: GASTROENTEROLOGY | Facility: CLINIC | Age: 43
End: 2023-10-03
Payer: MEDICARE

## 2023-10-03 ENCOUNTER — HOSPITAL ENCOUNTER (OUTPATIENT)
Dept: GENERAL RADIOLOGY | Facility: HOSPITAL | Age: 43
Discharge: HOME | End: 2023-10-03
Payer: MEDICARE

## 2023-10-03 VITALS
BODY MASS INDEX: 17.63 KG/M2 | SYSTOLIC BLOOD PRESSURE: 122 MMHG | WEIGHT: 119 LBS | HEIGHT: 69 IN | DIASTOLIC BLOOD PRESSURE: 78 MMHG

## 2023-10-03 DIAGNOSIS — E56.0 VITAMIN E DEFICIENCY: ICD-10-CM

## 2023-10-03 DIAGNOSIS — K59.04 CHRONIC IDIOPATHIC CONSTIPATION: Primary | ICD-10-CM

## 2023-10-03 DIAGNOSIS — K59.04 CHRONIC IDIOPATHIC CONSTIPATION: ICD-10-CM

## 2023-10-03 DIAGNOSIS — R10.13 DYSPEPSIA: ICD-10-CM

## 2023-10-03 DIAGNOSIS — E44.0 MODERATE PROTEIN-CALORIE MALNUTRITION: ICD-10-CM

## 2023-10-03 PROCEDURE — 74018 RADEX ABDOMEN 1 VIEW: CPT

## 2023-10-03 RX ORDER — SUMATRIPTAN 50 MG/1
50 TABLET, FILM COATED ORAL ONCE AS NEEDED
COMMUNITY
Start: 2023-08-10

## 2023-10-03 RX ORDER — VITAMIN E 268 MG
800 CAPSULE ORAL DAILY
Qty: 60 CAPSULE | Refills: 11 | Status: SHIPPED | OUTPATIENT
Start: 2023-10-03

## 2023-10-03 RX ORDER — TRAMADOL HYDROCHLORIDE 50 MG/1
50 TABLET ORAL EVERY 6 HOURS PRN
COMMUNITY
Start: 2023-09-06

## 2023-10-03 NOTE — ASSESSMENT & PLAN NOTE
- Uncontrolled.   - Transition from Trulance to Linzess 290 mcg daily   - Obtain ARM to further evaluate

## 2023-10-03 NOTE — PROGRESS NOTES
Aruna Hammonds is a 43 y.o. female with a past medical history of H Pylori Gastritis, Chronic Idiopathic Constipation, GERD, s/p CCY, Psoriasis on Humira, Bipolar DO who presents with   Chief Complaint   Patient presents with    Chronic idiopathic constipation       Subjective     # Chronic Idiopathic Constipation   # Dyspepsia   # Unintentional weight loss   - Adherent to Trulance 3 mg QD.  - Reports constipation is inadequately controlled -- has one bowel movement per week. Her last BM was this morning that was associated with significant straining.   - Associated with abdominal bloating.   - Started on Elavil 25 mg QHS at last appointment. Endorses adherence to taking the mediation. Reports mild improvement in epigastric pain since starting the medication.   - Reports N/V is controlled with current medication regimen.   - Still awaiting GET.   - EGD on  had mild gastritis s/p bx negative for H pylori.     # Moderate Protein Calorie Malnutrition   - Has loss 22 lbs in the last 2 months unintentionally.   - Noted to have recent admission at OSH for ataxia. Workup significant for Vitamin B12 level of 252 and Vitamin E level of 4.9. Reports being on PO Cyanocobalamin following her discharge.   - Endorses paraesthesias of her fingers.         Past Medical History:   Diagnosis Date    Abdominal pain     pt states normal gastric emptying study, lost 40 lbs since 2023, being referred to GI    ADHD (attention deficit hyperactivity disorder)     Bipolar 1 disorder     Depression     Endometriosis     GERD (gastroesophageal reflux disease)     Hypertension     Hypothyroid     Migraines     Ovarian cyst     Psychosis     PTSD (post-traumatic stress disorder)        Social History     Socioeconomic History    Marital status:    Tobacco Use    Smoking status: Former     Packs/day: 1.00     Years: 5.00     Pack years: 5.00     Types: Cigarettes     Quit date: 2022     Years since quittin.5    Smokeless  tobacco: Never    Tobacco comments:     caff use   Vaping Use    Vaping Use: Never used   Substance and Sexual Activity    Alcohol use: No    Drug use: No    Sexual activity: Defer         Current Outpatient Medications:     ALPRAZolam (XANAX) 0.25 MG tablet, Take 1 tablet by mouth 2 (Two) Times a Day. Take dos, Disp: , Rfl:     amitriptyline (ELAVIL) 25 MG tablet, Take 1 tablet by mouth Every Night., Disp: 90 tablet, Rfl: 3    ARIPiprazole (ABILIFY) 10 MG tablet, Take 1 tablet by mouth Daily., Disp: , Rfl:     FLUoxetine (PROzac) 40 MG capsule, Take 1 capsule by mouth Daily. Take dos, Disp: , Rfl:     Humira 40 MG/0.8ML Prefilled Syringe Kit injection, Inject 0.8 mL under the skin into the appropriate area as directed Every 14 (Fourteen) Days., Disp: , Rfl:     levothyroxine (SYNTHROID, LEVOTHROID) 125 MCG tablet, Take 1 tablet by mouth Every Morning. Take dos, Disp: , Rfl:     lithium (ESKALITH) 450 MG CR tablet, Take 2 tablets by mouth Every Night., Disp: , Rfl:     mineral oil enema, Insert 1 each into the rectum Daily As Needed for Constipation., Disp: 10 each, Rfl: 3    omeprazole (priLOSEC) 40 MG capsule, Take 1 capsule by mouth 2 (Two) Times a Day Before Meals. Take dos, Disp: 180 capsule, Rfl: 3    ondansetron ODT (ZOFRAN-ODT) 8 MG disintegrating tablet, Place 1 tablet on the tongue Every 8 (Eight) Hours As Needed for Nausea or Vomiting., Disp: 270 tablet, Rfl: 3    potassium chloride (MICRO-K) 10 MEQ CR capsule, , Disp: , Rfl:     promethazine (PHENERGAN) 25 MG suppository, Insert 1 suppository into the rectum Every 6 (Six) Hours As Needed for Nausea or Vomiting., Disp: 360 suppository, Rfl: 3    SUMAtriptan (IMITREX) 50 MG tablet, Take 1 tablet by mouth 1 (One) Time As Needed., Disp: , Rfl:     topiramate (TOPAMAX) 100 MG tablet, Take 1 tablet by mouth 2 (Two) Times a Day. Take dos, Disp: , Rfl:     traMADol (ULTRAM) 50 MG tablet, Take 1 tablet by mouth Every 6 (Six) Hours As Needed., Disp: , Rfl:      linaclotide (Linzess) 290 MCG capsule capsule, Take 1 capsule by mouth Every Morning Before Breakfast., Disp: 30 capsule, Rfl: 11    vitamin E 400 UNIT capsule, Take 2 capsules by mouth Daily., Disp: 60 capsule, Rfl: 11    Objective   Vitals:    10/03/23 1321   BP: 122/78         10/03/23  1321   Weight: 54 kg (119 lb)     Body mass index is 17.57 kg/m².      Physical Exam  Constitutional:       Comments: Using walker for ambulation now, frail appearing   HENT:      Head: Normocephalic and atraumatic.      Comments: Temporal wasting noted   Eyes:      Extraocular Movements: Extraocular movements intact.      Pupils: Pupils are equal, round, and reactive to light.   Cardiovascular:      Rate and Rhythm: Normal rate and regular rhythm.      Heart sounds: Normal heart sounds.   Pulmonary:      Effort: Pulmonary effort is normal.      Breath sounds: Normal breath sounds.   Abdominal:      General: Abdomen is flat. Bowel sounds are normal. There is no distension.      Palpations: Abdomen is soft.      Tenderness: There is no abdominal tenderness.   Neurological:      Mental Status: She is alert.   Psychiatric:         Mood and Affect: Mood normal.         Behavior: Behavior normal.       WBC   Date Value Ref Range Status   08/26/2023 13.58 (H) 4.5 - 11.0 10*3/uL Final     RBC   Date Value Ref Range Status   08/26/2023 3.67 (L) 4.0 - 5.2 10*6/uL Final     Hemoglobin   Date Value Ref Range Status   08/26/2023 11.3 (L) 12.0 - 16.0 g/dL Final     Hematocrit   Date Value Ref Range Status   08/26/2023 34.9 (L) 36.0 - 46.0 % Final     MCV   Date Value Ref Range Status   08/26/2023 95.1 80.0 - 100.0 fL Final     MCH   Date Value Ref Range Status   08/26/2023 30.8 26.0 - 34.0 pg Final     MCHC   Date Value Ref Range Status   08/26/2023 32.4 31.0 - 37.0 g/dL Final     RDW   Date Value Ref Range Status   08/26/2023 17.7 (H) 12.0 - 16.8 % Final     RDW-SD   Date Value Ref Range Status   07/31/2023 51.6 37.0 - 54.0 fl Final     MPV    Date Value Ref Range Status   08/26/2023 10.8 8.4 - 12.4 fL Final     Platelets   Date Value Ref Range Status   08/26/2023 406 140 - 440 10*3/uL Final     Neutrophil Rel %   Date Value Ref Range Status   08/19/2023 59.1 45 - 80 % Final     Lymphocyte Rel %   Date Value Ref Range Status   08/19/2023 31.2 15 - 50 % Final     Monocyte Rel %   Date Value Ref Range Status   08/19/2023 7.4 0 - 15 % Final     Eosinophil %   Date Value Ref Range Status   08/19/2023 1.7 0 - 7 % Final   07/21/2023 0.8 % Final     Basophil Rel %   Date Value Ref Range Status   08/19/2023 0.4 0 - 2 % Final     Immature Grans %   Date Value Ref Range Status   08/19/2023 0.2 0.0 - 1.0 % Final     Neutrophils Absolute   Date Value Ref Range Status   08/19/2023 2.72 2.0 - 8.8 10*3/uL Final     Lymphocytes Absolute   Date Value Ref Range Status   08/19/2023 1.44 0.7 - 5.5 10*3/uL Final     Monocytes Absolute   Date Value Ref Range Status   08/19/2023 0.34 0.0 - 1.7 10*3/uL Final     Eosinophils Absolute   Date Value Ref Range Status   08/19/2023 0.08 0.0 - 0.8 10*3/uL Final     Basophils Absolute   Date Value Ref Range Status   08/19/2023 0.02 0.0 - 0.2 10*3/uL Final     Immature Grans, Absolute   Date Value Ref Range Status   08/19/2023 0.01 0.00 - 0.10 10*3/uL Final     nRBC   Date Value Ref Range Status   08/19/2023 0 0 /100(WBC) Final   07/31/2023 0.0 0.0 - 0.2 /100 WBC Final       Lab Results   Component Value Date    GLUCOSE 95 07/31/2023    BUN 3 (L) 07/31/2023    CREATININE 0.73 07/31/2023    EGFRIFNONA 88 11/11/2021    BCR 4.1 (L) 07/31/2023    CO2 20.7 (L) 07/31/2023    CALCIUM 8.7 07/31/2023    ALBUMIN 2,960 (L) 08/21/2023    LABIL2 1.3 02/28/2020    AST 27 07/31/2023    ALT 24 07/31/2023         Imaging Results (Last 7 Days)       ** No results found for the last 168 hours. **              Assessment & Plan   Diagnoses and all orders for this visit:    1. Chronic idiopathic constipation (Primary)  Assessment & Plan:  - Uncontrolled.    - Transition from Trulance to Linzess 290 mcg daily   - Obtain ARM to further evaluate     Orders:  -     linaclotide (Linzess) 290 MCG capsule capsule; Take 1 capsule by mouth Every Morning Before Breakfast.  Dispense: 30 capsule; Refill: 11  -     Anorectal manometry    2. Dyspepsia  Assessment & Plan:  - Mild improvement   - Continue Elavil 25 mg QHS   - GET still pending       3. Moderate protein-calorie malnutrition  Assessment & Plan:  - Dietitian consult   - Obtain micronutrient panel to further evaluate     Orders:  -     Copper, Serum; Future  -     Zinc  -     Selenium Serum; Future  -     Manganese; Future  -     Vitamin B12  -     Folate  -     Ambulatory Referral to Nutrition Services    4. Vitamin E deficiency  Assessment & Plan:  - Start Vitamin E 800 units daily for repletion     Orders:  -     vitamin E 400 UNIT capsule; Take 2 capsules by mouth Daily.  Dispense: 60 capsule; Refill: 11        I have discussed the above plan with the patient.  They verbalize understanding and are in agreement with the plan.  They have been advised to contact the office for any questions, concerns, or changes related to their health.

## 2023-10-04 NOTE — PROGRESS NOTES
- KUB obtained for constipation which does not show any evidence of an obstruction. It showed colonic stool burden in the left colon.   - POC: Transitioned to Linzess 290 mcg daily to treat IBS-C

## 2023-10-15 NOTE — PROGRESS NOTES
Aruna Hammonds is a 43 y.o. female with PMH of H Pylori Gastritis, Chronic Idiopathic Constipation, GERD, s/p CCY, Psoriasis on Humira, Bipolar DO who presents with   Chief Complaint   Patient presents with    Hepatic steatosis    Crohn's Disease       Subjective     # Ileitis per CT imaging   - Recently seen at [facility] for abdominal pain and diarrhea where she had a CT A/P showed a fluid filled GI tract that may reflect diarrheal illness and possible terminal ileum wall thickening.   - ER provider placed patient on a 3 week Prednisone taper.   - Diarrhea seems to have correlated with starting Linzess 290 mcg daily. Having 2 to 3 loose bowel movements per day.     # Chronic Idiopathic Constipation   # Dyspepsia   # Unintentional weight loss   - Constipation was uncontrolled with Trulance so transitioned to Linzess 290 mcg daily at last appointment earlier this month. Now is having diarrhea with Linzess 290 mcg daily.     - Reports she did not receive Elavil and is asking for another prescription.   - Reports N/V is controlled with current medication regimen.   - Still awaiting GET and ARM   - EGD on 8/4 had mild gastritis s/p bx negative for H pylori.      # Moderate Protein Calorie Malnutrition   - Has loss 22 lbs in the last 2 months unintentionally.   - Noted to have recent admission at OSH for ataxia. Workup significant for Vitamin B12 level of 252 and Vitamin E level of 4.9.   - Started on Vitamin E supplementation following last appointment.   - Did not obtain micronutrient panel following last appointment.     # Vitamin E Deficiency   - Started on Vitamin E 800 units daily following last appointment but she reports she never received the medication.                   Past Medical History:   Diagnosis Date    Abdominal pain     pt states normal gastric emptying study, lost 40 lbs since 1/2023, being referred to GI    ADHD (attention deficit hyperactivity disorder)     Bipolar 1 disorder      Depression     Endometriosis     GERD (gastroesophageal reflux disease)     Hypertension     Hypothyroid     Migraines     Ovarian cyst     Psychosis     PTSD (post-traumatic stress disorder)        Social History     Socioeconomic History    Marital status:    Tobacco Use    Smoking status: Former     Packs/day: 1.00     Years: 5.00     Additional pack years: 0.00     Total pack years: 5.00     Types: Cigarettes     Quit date: 2022     Years since quittin.6    Smokeless tobacco: Never    Tobacco comments:     caff use   Vaping Use    Vaping Use: Never used   Substance and Sexual Activity    Alcohol use: No    Drug use: No    Sexual activity: Defer         Current Outpatient Medications:     ALPRAZolam (XANAX) 0.25 MG tablet, Take 1 tablet by mouth 2 (Two) Times a Day. Take dos, Disp: , Rfl:     amitriptyline (ELAVIL) 25 MG tablet, Take 1 tablet by mouth Every Night., Disp: 90 tablet, Rfl: 3    ARIPiprazole (ABILIFY) 10 MG tablet, Take 1 tablet by mouth Daily., Disp: , Rfl:     FLUoxetine (PROzac) 40 MG capsule, Take 1 capsule by mouth Daily. Take dos, Disp: , Rfl:     Humira 40 MG/0.8ML Prefilled Syringe Kit injection, Inject 0.8 mL under the skin into the appropriate area as directed Every 14 (Fourteen) Days., Disp: , Rfl:     levothyroxine (SYNTHROID, LEVOTHROID) 125 MCG tablet, Take 1 tablet by mouth Every Morning. Take dos, Disp: , Rfl:     mineral oil enema, Insert 1 each into the rectum Daily As Needed for Constipation., Disp: 10 each, Rfl: 3    omeprazole (priLOSEC) 40 MG capsule, Take 1 capsule by mouth 2 (Two) Times a Day Before Meals. Take dos, Disp: 180 capsule, Rfl: 3    ondansetron ODT (ZOFRAN-ODT) 8 MG disintegrating tablet, Place 1 tablet on the tongue Every 8 (Eight) Hours As Needed for Nausea or Vomiting., Disp: 270 tablet, Rfl: 3    potassium chloride (MICRO-K) 10 MEQ CR capsule, , Disp: , Rfl:     predniSONE (DELTASONE) 20 MG tablet, , Disp: , Rfl:     promethazine (PHENERGAN)  25 MG suppository, Insert 1 suppository into the rectum Every 6 (Six) Hours As Needed for Nausea or Vomiting., Disp: 360 suppository, Rfl: 3    SUMAtriptan (IMITREX) 50 MG tablet, Take 1 tablet by mouth 1 (One) Time As Needed., Disp: , Rfl:     topiramate (TOPAMAX) 100 MG tablet, Take 1 tablet by mouth 2 (Two) Times a Day. Take dos, Disp: , Rfl:     traMADol (ULTRAM) 50 MG tablet, Take 1 tablet by mouth Every 6 (Six) Hours As Needed., Disp: , Rfl:     vitamin E 400 UNIT capsule, Take 2 capsules by mouth Daily., Disp: 60 capsule, Rfl: 11    linaclotide (Linzess) 145 MCG capsule capsule, Take 1 capsule by mouth Every Morning Before Breakfast., Disp: 30 capsule, Rfl: 11    Multiple Vitamins-Minerals (multivitamin with iron-minerals, CENTRUM,) liquid, Take 10 mL by mouth Daily., Disp: 236 mL, Rfl: 11    Objective   Vitals:    10/16/23 0944   BP: 120/80         10/16/23  0944   Weight: 56.8 kg (125 lb 3.2 oz)     Body mass index is 18.49 kg/m².      Physical Exam    WBC   Date Value Ref Range Status   08/26/2023 13.58 (H) 4.5 - 11.0 10*3/uL Final     RBC   Date Value Ref Range Status   08/26/2023 3.67 (L) 4.0 - 5.2 10*6/uL Final     Hemoglobin   Date Value Ref Range Status   08/26/2023 11.3 (L) 12.0 - 16.0 g/dL Final     Hematocrit   Date Value Ref Range Status   08/26/2023 34.9 (L) 36.0 - 46.0 % Final     MCV   Date Value Ref Range Status   08/26/2023 95.1 80.0 - 100.0 fL Final     MCH   Date Value Ref Range Status   08/26/2023 30.8 26.0 - 34.0 pg Final     MCHC   Date Value Ref Range Status   08/26/2023 32.4 31.0 - 37.0 g/dL Final     RDW   Date Value Ref Range Status   08/26/2023 17.7 (H) 12.0 - 16.8 % Final     RDW-SD   Date Value Ref Range Status   07/31/2023 51.6 37.0 - 54.0 fl Final     MPV   Date Value Ref Range Status   08/26/2023 10.8 8.4 - 12.4 fL Final     Platelets   Date Value Ref Range Status   08/26/2023 406 140 - 440 10*3/uL Final     Neutrophil Rel %   Date Value Ref Range Status   08/19/2023 59.1 45 -  80 % Final     Lymphocyte Rel %   Date Value Ref Range Status   08/19/2023 31.2 15 - 50 % Final     Monocyte Rel %   Date Value Ref Range Status   08/19/2023 7.4 0 - 15 % Final     Eosinophil %   Date Value Ref Range Status   08/19/2023 1.7 0 - 7 % Final   07/21/2023 0.8 % Final     Basophil Rel %   Date Value Ref Range Status   08/19/2023 0.4 0 - 2 % Final     Immature Grans %   Date Value Ref Range Status   08/19/2023 0.2 0.0 - 1.0 % Final     Neutrophils Absolute   Date Value Ref Range Status   08/19/2023 2.72 2.0 - 8.8 10*3/uL Final     Lymphocytes Absolute   Date Value Ref Range Status   08/19/2023 1.44 0.7 - 5.5 10*3/uL Final     Monocytes Absolute   Date Value Ref Range Status   08/19/2023 0.34 0.0 - 1.7 10*3/uL Final     Eosinophils Absolute   Date Value Ref Range Status   08/19/2023 0.08 0.0 - 0.8 10*3/uL Final     Basophils Absolute   Date Value Ref Range Status   08/19/2023 0.02 0.0 - 0.2 10*3/uL Final     Immature Grans, Absolute   Date Value Ref Range Status   08/19/2023 0.01 0.00 - 0.10 10*3/uL Final     nRBC   Date Value Ref Range Status   08/19/2023 0 0 /100(WBC) Final   07/31/2023 0.0 0.0 - 0.2 /100 WBC Final       Lab Results   Component Value Date    GLUCOSE 95 07/31/2023    BUN 3 (L) 07/31/2023    CREATININE 0.73 07/31/2023    EGFRIFNONA 88 11/11/2021    BCR 4.1 (L) 07/31/2023    CO2 20.7 (L) 07/31/2023    CALCIUM 8.7 07/31/2023    ALBUMIN 2,960 (L) 08/21/2023    LABIL2 1.3 02/28/2020    AST 27 07/31/2023    ALT 24 07/31/2023         Imaging Results (Last 7 Days)       ** No results found for the last 168 hours. **              Assessment & Plan   Diagnoses and all orders for this visit:    1. Ileitis (Primary)  Assessment & Plan:  - Per OSH CT imaging  - Obtain fecal calprotectin   - CT Enterography ordered to further evaluate (also to rule out small bowel tumors given unintentional weight loss)  - Schedule colonoscopy to further evaluate     Orders:  -     Calprotectin, Fecal - Stool, Per  Rectum  -     CT Abdomen With Contrast Enterography  -     Case Request; Standing  -     Case Request    2. Chronic idiopathic constipation  Assessment & Plan:  - Decrease to Linzess 145 mcg daily given diarrhea with Linzess 290 mcg daily   - Pending ARM to further evaluate     Orders:  -     linaclotide (Linzess) 145 MCG capsule capsule; Take 1 capsule by mouth Every Morning Before Breakfast.  Dispense: 30 capsule; Refill: 11    3. Dyspepsia  Assessment & Plan:  - Renew Elavil 25 mg QHS   - GET still pending     Orders:  -     amitriptyline (ELAVIL) 25 MG tablet; Take 1 tablet by mouth Every Night.  Dispense: 90 tablet; Refill: 3    4. Moderate protein-calorie malnutrition  Assessment & Plan:  - Dietitian consult not available through the [facility]   - Obtain micronutrient panel to further evaluate   - Start daily MVI     Orders:  -     Multiple Vitamins-Minerals (multivitamin with iron-minerals, CENTRUM,) liquid; Take 10 mL by mouth Daily.  Dispense: 236 mL; Refill: 11    5. Vitamin E deficiency  Assessment & Plan:  - Renew Vitamin E 800 units daily for repletion      Orders:  -     vitamin E 400 UNIT capsule; Take 2 capsules by mouth Daily.  Dispense: 60 capsule; Refill: 11    Other orders  -     Obtain informed consent; Standing  -     Follow Anesthesia Guidelines / Protocol; Future  -     Verify bowel prep was successful; Standing  -     Give tap water enema if bowel prep was insufficient; Standing        I have discussed the above plan with the patient.  They verbalize understanding and are in agreement with the plan.  They have been advised to contact the office for any questions, concerns, or changes related to their health.

## 2023-10-15 NOTE — ASSESSMENT & PLAN NOTE
- Dietitian consult not available through the [facility]   - Obtain micronutrient panel to further evaluate   - Start daily MVI

## 2023-10-15 NOTE — ASSESSMENT & PLAN NOTE
- Decrease to Linzess 145 mcg daily given diarrhea with Linzess 290 mcg daily   - Pending ARM to further evaluate

## 2023-10-16 ENCOUNTER — OFFICE VISIT (OUTPATIENT)
Dept: GASTROENTEROLOGY | Facility: CLINIC | Age: 43
End: 2023-10-16
Payer: MEDICARE

## 2023-10-16 ENCOUNTER — TRANSCRIBE ORDERS (OUTPATIENT)
Dept: ADMINISTRATIVE | Facility: HOSPITAL | Age: 43
End: 2023-10-16
Payer: MEDICARE

## 2023-10-16 ENCOUNTER — LAB (OUTPATIENT)
Dept: LAB | Facility: HOSPITAL | Age: 43
End: 2023-10-16
Payer: MEDICARE

## 2023-10-16 VITALS
SYSTOLIC BLOOD PRESSURE: 120 MMHG | WEIGHT: 125.2 LBS | DIASTOLIC BLOOD PRESSURE: 80 MMHG | HEIGHT: 69 IN | BODY MASS INDEX: 18.54 KG/M2

## 2023-10-16 DIAGNOSIS — K59.04 CHRONIC IDIOPATHIC CONSTIPATION: ICD-10-CM

## 2023-10-16 DIAGNOSIS — E56.0 VITAMIN E DEFICIENCY: ICD-10-CM

## 2023-10-16 DIAGNOSIS — K52.9 ILEITIS: Primary | ICD-10-CM

## 2023-10-16 DIAGNOSIS — E44.0 MODERATE PROTEIN-CALORIE MALNUTRITION: ICD-10-CM

## 2023-10-16 DIAGNOSIS — R10.13 DYSPEPSIA: ICD-10-CM

## 2023-10-16 LAB
FOLATE SERPL-MCNC: 3.72 NG/ML (ref 4.78–24.2)
VIT B12 BLD-MCNC: 746 PG/ML (ref 211–946)

## 2023-10-16 PROCEDURE — 83785 ASSAY OF MANGANESE: CPT

## 2023-10-16 PROCEDURE — 82525 ASSAY OF COPPER: CPT

## 2023-10-16 PROCEDURE — 84255 ASSAY OF SELENIUM: CPT

## 2023-10-16 PROCEDURE — 82607 VITAMIN B-12: CPT | Performed by: STUDENT IN AN ORGANIZED HEALTH CARE EDUCATION/TRAINING PROGRAM

## 2023-10-16 PROCEDURE — 82746 ASSAY OF FOLIC ACID SERUM: CPT | Performed by: STUDENT IN AN ORGANIZED HEALTH CARE EDUCATION/TRAINING PROGRAM

## 2023-10-16 PROCEDURE — 99214 OFFICE O/P EST MOD 30 MIN: CPT | Performed by: STUDENT IN AN ORGANIZED HEALTH CARE EDUCATION/TRAINING PROGRAM

## 2023-10-16 PROCEDURE — 84630 ASSAY OF ZINC: CPT | Performed by: STUDENT IN AN ORGANIZED HEALTH CARE EDUCATION/TRAINING PROGRAM

## 2023-10-16 RX ORDER — PREDNISONE 20 MG/1
TABLET ORAL
COMMUNITY
Start: 2023-10-08

## 2023-10-16 RX ORDER — VITAMIN E 268 MG
800 CAPSULE ORAL DAILY
Qty: 60 CAPSULE | Refills: 11 | Status: SHIPPED | OUTPATIENT
Start: 2023-10-16

## 2023-10-16 RX ORDER — AMITRIPTYLINE HYDROCHLORIDE 25 MG/1
25 TABLET, FILM COATED ORAL NIGHTLY
Qty: 90 TABLET | Refills: 3 | Status: SHIPPED | OUTPATIENT
Start: 2023-10-16

## 2023-10-16 RX ORDER — MULTIVIT WITH IRON,MINERALS
10 LIQUID (ML) ORAL DAILY
Qty: 236 ML | Refills: 11 | Status: SHIPPED | OUTPATIENT
Start: 2023-10-16

## 2023-10-16 NOTE — ASSESSMENT & PLAN NOTE
- Per OSH CT imaging  - Obtain fecal calprotectin   - CT Enterography ordered to further evaluate (also to rule out small bowel tumors given unintentional weight loss)  - Schedule colonoscopy to further evaluate

## 2023-10-18 LAB
MANGANESE SERPL-MCNC: <2.5 UG/L
SELENIUM SERPL-MCNC: 109 UG/L (ref 93–198)

## 2023-10-19 ENCOUNTER — LAB (OUTPATIENT)
Dept: LAB | Facility: HOSPITAL | Age: 43
End: 2023-10-19
Payer: MEDICARE

## 2023-10-19 DIAGNOSIS — E53.8 FOLATE DEFICIENCY: Primary | ICD-10-CM

## 2023-10-19 PROCEDURE — 83993 ASSAY FOR CALPROTECTIN FECAL: CPT | Performed by: STUDENT IN AN ORGANIZED HEALTH CARE EDUCATION/TRAINING PROGRAM

## 2023-10-19 RX ORDER — FOLIC ACID 1 MG/1
1 TABLET ORAL DAILY
Qty: 90 TABLET | Refills: 3 | Status: SHIPPED | OUTPATIENT
Start: 2023-10-19

## 2023-10-20 NOTE — PROGRESS NOTES
- Selenium and Manganese levels were obtained to assess for micronutrient deficiencies which were normal

## 2023-10-24 LAB — CALPROTECTIN STL-MCNT: 47 UG/G (ref 0–120)

## 2023-10-25 LAB
COPPER SERPL-MCNC: 80 UG/DL (ref 80–158)
ZINC SERPL-MCNC: 75 UG/DL (ref 44–115)

## 2023-10-26 ENCOUNTER — TELEPHONE (OUTPATIENT)
Dept: GASTROENTEROLOGY | Facility: CLINIC | Age: 43
End: 2023-10-26
Payer: MEDICARE

## 2023-11-01 ENCOUNTER — TELEPHONE (OUTPATIENT)
Dept: GASTROENTEROLOGY | Facility: CLINIC | Age: 43
End: 2023-11-01
Payer: MEDICARE

## 2023-11-01 NOTE — TELEPHONE ENCOUNTER
TRIED TO CALL PT REGARDING HER UPCOMING APPT WITH DR DAWKINS.     NO ANSWER AND THE VOICE MAIL WAS FULL.    SHE NOW HAS ONE APPT AND THE OTHER TWO WERE TAKEN OFF.

## 2023-11-01 NOTE — TELEPHONE ENCOUNTER
Caller: Aruna Hammonds    Relationship: Self    Best call back number: [phone]    What is the best time to reach you: ANYTIME BEFORE 11AM OR AFTER 1PM    Who are you requesting to speak with (clinical staff, provider,  specific staff member): CLINICAL STAFF    What was the call regarding: PT WANTS TO KNOW WHY SHE HAS SO MANY APPTS BACK TO BACK. PLEASE CONTACT PT TO ADVISE.     Is it okay if the provider responds through [facility] [employer]: NO

## 2023-11-02 ENCOUNTER — OFFICE VISIT (OUTPATIENT)
Dept: NEUROLOGY | Facility: CLINIC | Age: 43
End: 2023-11-02
Payer: MEDICARE

## 2023-11-02 VITALS
HEART RATE: 92 BPM | SYSTOLIC BLOOD PRESSURE: 100 MMHG | HEIGHT: 69 IN | DIASTOLIC BLOOD PRESSURE: 68 MMHG | BODY MASS INDEX: 18.72 KG/M2 | WEIGHT: 126.4 LBS | OXYGEN SATURATION: 98 %

## 2023-11-02 DIAGNOSIS — G61.82 MMN (MULTIFOCAL MOTOR NEUROPATHY): Primary | ICD-10-CM

## 2023-11-02 PROCEDURE — 99205 OFFICE O/P NEW HI 60 MIN: CPT | Performed by: PSYCHIATRY & NEUROLOGY

## 2023-11-02 PROCEDURE — 1160F RVW MEDS BY RX/DR IN RCRD: CPT | Performed by: PSYCHIATRY & NEUROLOGY

## 2023-11-02 PROCEDURE — 1159F MED LIST DOCD IN RCRD: CPT | Performed by: PSYCHIATRY & NEUROLOGY

## 2023-11-02 RX ORDER — GABAPENTIN 300 MG/1
300 CAPSULE ORAL 3 TIMES DAILY
Qty: 90 CAPSULE | Refills: 3 | Status: SHIPPED | OUTPATIENT
Start: 2023-11-02 | End: 2023-11-06 | Stop reason: DRUGHIGH

## 2023-11-02 NOTE — PATIENT INSTRUCTIONS
Instructions for gabapentin 300 mg:    Take 1 nightly for 3 days,     then 1 twice daily for 3 days,     then take one 3 times daily.

## 2023-11-02 NOTE — PROGRESS NOTES
Notes by MA:  PT is here today as a referral from Dr Gonzalez. Her wife, Hilary is here with her today. PT verifies consent for us to speak with Hilary regarding her medical care.      Subjective:     Dear Dr. Gonzalez, thank you for referring Mrs. Hammonds for consultation.  As you know, she is a 43-year-old woman sent for evaluation of imbalance and sensory changes.  Her problems began some 18 months ago.  Over the course of 4 to 5 months she had numbness tingling burning and imbalance that worsened, beginning in her legs and then involving her arms, over the course of 4 to 5 months to where she was nonambulatory.  She was admitted to [facility] and had an extremely thorough neurological work-up.  Complete imaging of the neuraxis including brain, C-spine, T-spine, and L-spine did not reveal a source.  I reviewed all of those studies.  She had a lumbar puncture which did not reveal demyelinating changes or albumino cytologic dissociation to suggest AIDP/CIDP.  Her paraneoplastic panel including Purkinje antibodies was negative.  Her B12 levels were normal, greater than 700.  Her vitamin D levels were borderline normal on the low side.  HIV was negative.  Copper levels were normal.  Celiac panel was negative.  EMG revealed motor neuropathy but no clear demyelinating features.  Axonal features were predominating.  She went through physical therapy after discharge and has gradually been improving.  She is not currently using ambulatory supportive devices.  Patient ID: Aruna Hammonds is a 43 y.o. female.    Gait Problem  Associated symptoms include abdominal pain, chest pain, headaches (has migraines), nausea, numbness, vomiting and weakness. Pertinent negatives include no arthralgias, fatigue, joint swelling, myalgias or neck pain.     The following portions of the patient's history were reviewed and updated as appropriate: allergies, current medications, past family history, past medical history,  "past social history, past surgical history, and problem list.    Review of Systems   Constitutional:  Positive for activity change and appetite change. Negative for fatigue.   HENT:  Negative for facial swelling, trouble swallowing and voice change.    Eyes:  Positive for photophobia, pain and visual disturbance.   Respiratory:  Positive for chest tightness. Negative for shortness of breath and wheezing.    Cardiovascular:  Positive for chest pain, palpitations and leg swelling.   Gastrointestinal:  Positive for abdominal pain, nausea and vomiting.   Endocrine: Positive for cold intolerance. Negative for heat intolerance.   Musculoskeletal:  Positive for back pain and gait problem. Negative for arthralgias, joint swelling, myalgias, neck pain and neck stiffness.   Neurological:  Positive for tremors (can see her muscles \"jumping\"), speech difficulty (word finding), weakness, light-headedness, numbness and headaches (has migraines). Negative for dizziness, seizures, syncope and facial asymmetry.   Hematological:  Does not bruise/bleed easily.   Psychiatric/Behavioral:  Positive for sleep disturbance (due to discomfort in her legs). Negative for agitation, behavioral problems, confusion, decreased concentration, dysphoric mood, hallucinations, self-injury and suicidal ideas. The patient is nervous/anxious. The patient is not hyperactive.         Objective:    Neurologic Exam  Awake alert cooperative.  Speech is clear and fluent and speech comprehension is normal.  Normal social demeanor.  Fully oriented.    Cranial nerves II through XII normal and symmetric.    Motor exam reveals generalized weakness at 4+ out of 5 throughout both upper and both lower extremities, possibly worse in the lower extremities and uppers.  She is symmetric however.  No adventitious movements.    Sensory exam reveals distal symmetric decrease in light touch and vibration sensation from her thighs distally.  Relatively preserved in comparison " in her arms and hands.    Tendon reflexes are diffusely absent.    Ordination testing reveals intention tremor with finger-nose-finger bilaterally, dysrhythmic rapid alternating movements including finger taps and toe taps.  Difficulty with heel-to-shin bilaterally.  Walks in a stiff wide-based gait.  No foot slap.  Physical Exam  Very thin.  Neck is supple.  No cervical bruits.  No extremity edema or cyanosis.  Reasonable capillary refill.  Assessment/Plan:     Diagnoses and all orders for this visit:    1. MMN (multifocal motor neuropathy) (Primary)  -     Ambulatory Referral to Neurology  -     gabapentin (NEURONTIN) 300 MG capsule; Take 1 capsule by mouth 3 (Three) Times a Day.  Dispense: 90 capsule; Refill: 3     43-year-old woman with presumed multifocal motor neuropathy.  She had an extensive work-up at [facility] as outlined in detail above.  Her symmetric involvement, rapid decline, and now gradual improvement since discharge argues strongly against motor neuron disease.  Referring her back over to [facility] neuromuscular specialist for further management.  In the meantime, starting her on gabapentin for neuropathic discomfort.  She will be titrated to 300 mg 3 times daily.  We talked about potential side effects.  We can continue to titrate further if necessary.  She was given specific written instructions on how to accomplish this.  I discussed her extensive work-up with her and her partner and answered their questions today.  Thank you very much for the opportunity to participate in the care of this pleasant woman and her challenging presentation.    65 minutes total patient care time including extensive record review, examination, discussion, , and documentation.

## 2023-11-03 ENCOUNTER — TELEPHONE (OUTPATIENT)
Dept: NEUROLOGY | Facility: CLINIC | Age: 43
End: 2023-11-03
Payer: MEDICARE

## 2023-11-03 NOTE — TELEPHONE ENCOUNTER
Called PT and let her know that Dr De La Torre is out of the office and will address this with him Monday am. V/u

## 2023-11-03 NOTE — TELEPHONE ENCOUNTER
Provider: JOHN    Caller: LILLIE      [id number]    Phone Number: [phone]     Reason for Call: PT CALLED AND STATES THAT SHE WAS PLACE ON GABAPENTIN YESTERDAY 11/02//23 AND THIS MORNING SHE WOKE UP OFF BALANCE. PT STATES THAT SHE ALMOST FEELDOWN HER STEPS AND ENDED UP FALLING IN HER KITCHEN WHERE IT TOOK HER 10 MINUTES  TO GET UP. PT STATES THAT THE OFF BALANCE HAS LASTED ALL DAY. PT IS WANTING TO KNOW IF THERE IS SOMETHING ELSE THAT SHE CAN PLACED ON.    PLEASE REVIEW AND ADVISE.  THANK YOU

## 2023-11-06 ENCOUNTER — PATIENT ROUNDING (BHMG ONLY) (OUTPATIENT)
Dept: NEUROLOGY | Facility: CLINIC | Age: 43
End: 2023-11-06
Payer: MEDICARE

## 2023-11-06 DIAGNOSIS — G61.82 MMN (MULTIFOCAL MOTOR NEUROPATHY): Primary | ICD-10-CM

## 2023-11-06 RX ORDER — GABAPENTIN 100 MG/1
CAPSULE ORAL
Qty: 90 CAPSULE | Refills: 2 | Status: SHIPPED | OUTPATIENT
Start: 2023-11-06

## 2023-11-07 ENCOUNTER — TELEPHONE (OUTPATIENT)
Dept: GASTROENTEROLOGY | Facility: CLINIC | Age: 43
End: 2023-11-07
Payer: MEDICARE

## 2023-11-09 ENCOUNTER — TELEPHONE (OUTPATIENT)
Dept: NEUROLOGY | Facility: CLINIC | Age: 43
End: 2023-11-09
Payer: MEDICARE

## 2023-11-09 DIAGNOSIS — G61.82 MMN (MULTIFOCAL MOTOR NEUROPATHY): Primary | ICD-10-CM

## 2023-11-09 NOTE — TELEPHONE ENCOUNTER
Provider: JOHN    Caller: PATIENT    Relationship to Patient: SELF    Pharmacy: [facility]    Phone Number: [phone]    Reason for Call: PATIENT CALLED BECAUSE HER PHARMACY CALLED HER REGARDING HER GABAPENTIN BEING CHANGED   MG.  PT STATES SHE WOULD LIKE TO CONTINUE WITH  MG.  PT STATES SHE HAS BEEN TAKING  MG TABLETS THREE TIMES A DAY AND WOULD LIKE TO CONTINUE THAT.  PATIENT WOULD LIKE A CALL BACK TO DISCUSS THIS PLEASE.  PT STATES HER SISTER IS THE ONE WHO HAD CALLED AND STATED SHE HAD FALLEN, ACTING LIKE SHE WAS THE PT.     PLEASE REVIEW AND ADVISE     THANK YOU

## 2023-11-09 NOTE — TELEPHONE ENCOUNTER
Called PT and let her know that Dr. De La Torre has noted the issue and states that she needs to take the medicine as prescribed which is 1 300mg tablet 3 times daily. V/u. She states that she has not picked up the 100 mg

## 2023-11-09 NOTE — TELEPHONE ENCOUNTER
Provider: DR LOPEZ    Caller: PATIENT    Relationship to Patient: SELF    Phone Number: [phone]    Reason for Call: PATIENT CALLED [facility] TO GET SET UP FOR HER APPT FROM HER REFERRAL. THEY ADVISED THAT SHE HAD MISSED AN APPT WITH THEM IN 2018 AND SHE COULD NOT BE SEEN IN THEIR OFFICE. PLEASE REVIEW AND ADVISE, THANK YOU.

## 2023-11-10 ENCOUNTER — ANESTHESIA EVENT (OUTPATIENT)
Dept: PERIOP | Facility: HOSPITAL | Age: 43
End: 2023-11-10
Payer: MEDICARE

## 2023-11-10 ENCOUNTER — HOSPITAL ENCOUNTER (OUTPATIENT)
Dept: CT IMAGING | Facility: HOSPITAL | Age: 43
Discharge: HOME | End: 2023-11-10
Admitting: STUDENT IN AN ORGANIZED HEALTH CARE EDUCATION/TRAINING PROGRAM
Payer: MEDICARE

## 2023-11-10 PROCEDURE — 74160 CT ABDOMEN W/CONTRAST: CPT

## 2023-11-10 PROCEDURE — 25510000001 IOPAMIDOL PER 1 ML: Performed by: STUDENT IN AN ORGANIZED HEALTH CARE EDUCATION/TRAINING PROGRAM

## 2023-11-10 RX ADMIN — IOPAMIDOL 100 ML: 755 INJECTION, SOLUTION INTRAVENOUS at 11:21

## 2023-11-13 ENCOUNTER — ANESTHESIA (OUTPATIENT)
Dept: PERIOP | Facility: HOSPITAL | Age: 43
End: 2023-11-13
Payer: MEDICARE

## 2023-11-13 ENCOUNTER — HOSPITAL ENCOUNTER (OUTPATIENT)
Facility: HOSPITAL | Age: 43
Setting detail: HOSPITAL OUTPATIENT SURGERY
Discharge: HOME | End: 2023-11-13
Attending: STUDENT IN AN ORGANIZED HEALTH CARE EDUCATION/TRAINING PROGRAM | Admitting: STUDENT IN AN ORGANIZED HEALTH CARE EDUCATION/TRAINING PROGRAM
Payer: MEDICARE

## 2023-11-13 VITALS
WEIGHT: 121 LBS | OXYGEN SATURATION: 98 % | TEMPERATURE: 97.5 F | BODY MASS INDEX: 17.86 KG/M2 | RESPIRATION RATE: 15 BRPM | HEART RATE: 78 BPM | SYSTOLIC BLOOD PRESSURE: 105 MMHG | DIASTOLIC BLOOD PRESSURE: 64 MMHG

## 2023-11-13 DIAGNOSIS — K52.9 ILEITIS: ICD-10-CM

## 2023-11-13 PROCEDURE — 25010000002 PROPOFOL 200 MG/20ML EMULSION: Performed by: NURSE ANESTHETIST, CERTIFIED REGISTERED

## 2023-11-13 PROCEDURE — 45380 COLONOSCOPY AND BIOPSY: CPT | Performed by: STUDENT IN AN ORGANIZED HEALTH CARE EDUCATION/TRAINING PROGRAM

## 2023-11-13 PROCEDURE — 88305 TISSUE EXAM BY PATHOLOGIST: CPT | Performed by: STUDENT IN AN ORGANIZED HEALTH CARE EDUCATION/TRAINING PROGRAM

## 2023-11-13 PROCEDURE — 25810000003 LACTATED RINGERS PER 1000 ML: Performed by: NURSE ANESTHETIST, CERTIFIED REGISTERED

## 2023-11-13 RX ORDER — SODIUM CHLORIDE 0.9 % (FLUSH) 0.9 %
10 SYRINGE (ML) INJECTION AS NEEDED
Status: DISCONTINUED | OUTPATIENT
Start: 2023-11-13 | End: 2023-11-13 | Stop reason: HOSPADM

## 2023-11-13 RX ORDER — ONDANSETRON 2 MG/ML
4 INJECTION INTRAMUSCULAR; INTRAVENOUS ONCE AS NEEDED
Status: DISCONTINUED | OUTPATIENT
Start: 2023-11-13 | End: 2023-11-13 | Stop reason: HOSPADM

## 2023-11-13 RX ORDER — SODIUM CHLORIDE, SODIUM LACTATE, POTASSIUM CHLORIDE, CALCIUM CHLORIDE 600; 310; 30; 20 MG/100ML; MG/100ML; MG/100ML; MG/100ML
100 INJECTION, SOLUTION INTRAVENOUS CONTINUOUS
Status: DISCONTINUED | OUTPATIENT
Start: 2023-11-13 | End: 2023-11-13 | Stop reason: HOSPADM

## 2023-11-13 RX ORDER — SODIUM CHLORIDE 0.9 % (FLUSH) 0.9 %
10 SYRINGE (ML) INJECTION EVERY 12 HOURS SCHEDULED
Status: DISCONTINUED | OUTPATIENT
Start: 2023-11-13 | End: 2023-11-13 | Stop reason: HOSPADM

## 2023-11-13 RX ORDER — MAGNESIUM HYDROXIDE 1200 MG/15ML
LIQUID ORAL AS NEEDED
Status: DISCONTINUED | OUTPATIENT
Start: 2023-11-13 | End: 2023-11-13 | Stop reason: HOSPADM

## 2023-11-13 RX ORDER — PROPOFOL 10 MG/ML
INJECTION, EMULSION INTRAVENOUS AS NEEDED
Status: DISCONTINUED | OUTPATIENT
Start: 2023-11-13 | End: 2023-11-13 | Stop reason: SURG

## 2023-11-13 RX ORDER — SODIUM CHLORIDE 9 MG/ML
40 INJECTION, SOLUTION INTRAVENOUS AS NEEDED
Status: DISCONTINUED | OUTPATIENT
Start: 2023-11-13 | End: 2023-11-13 | Stop reason: HOSPADM

## 2023-11-13 RX ORDER — SODIUM CHLORIDE, SODIUM LACTATE, POTASSIUM CHLORIDE, CALCIUM CHLORIDE 600; 310; 30; 20 MG/100ML; MG/100ML; MG/100ML; MG/100ML
9 INJECTION, SOLUTION INTRAVENOUS CONTINUOUS PRN
Status: DISCONTINUED | OUTPATIENT
Start: 2023-11-13 | End: 2023-11-13 | Stop reason: HOSPADM

## 2023-11-13 RX ORDER — LIDOCAINE HYDROCHLORIDE 20 MG/ML
INJECTION, SOLUTION INFILTRATION; PERINEURAL AS NEEDED
Status: DISCONTINUED | OUTPATIENT
Start: 2023-11-13 | End: 2023-11-13 | Stop reason: SURG

## 2023-11-13 RX ADMIN — PROPOFOL INJECTABLE EMULSION 30 MG: 10 INJECTION, EMULSION INTRAVENOUS at 09:33

## 2023-11-13 RX ADMIN — PROPOFOL INJECTABLE EMULSION 20 MG: 10 INJECTION, EMULSION INTRAVENOUS at 09:40

## 2023-11-13 RX ADMIN — PROPOFOL INJECTABLE EMULSION 70 MG: 10 INJECTION, EMULSION INTRAVENOUS at 09:30

## 2023-11-13 RX ADMIN — PROPOFOL INJECTABLE EMULSION 20 MG: 10 INJECTION, EMULSION INTRAVENOUS at 09:49

## 2023-11-13 RX ADMIN — SODIUM CHLORIDE, POTASSIUM CHLORIDE, SODIUM LACTATE AND CALCIUM CHLORIDE 9 ML/HR: 600; 310; 30; 20 INJECTION, SOLUTION INTRAVENOUS at 08:48

## 2023-11-13 RX ADMIN — LIDOCAINE HYDROCHLORIDE 50 MG: 20 INJECTION, SOLUTION INFILTRATION; PERINEURAL at 09:30

## 2023-11-13 RX ADMIN — PROPOFOL INJECTABLE EMULSION 20 MG: 10 INJECTION, EMULSION INTRAVENOUS at 09:43

## 2023-11-13 RX ADMIN — PROPOFOL INJECTABLE EMULSION 20 MG: 10 INJECTION, EMULSION INTRAVENOUS at 09:46

## 2023-11-13 RX ADMIN — PROPOFOL INJECTABLE EMULSION 20 MG: 10 INJECTION, EMULSION INTRAVENOUS at 09:37

## 2023-11-13 NOTE — H&P
Patient Care Team:  Familia Gonzalez MD as PCP - General (Internal Medicine)    CHIEF COMPLAINT:  ileitis per CT imaging    HISTORY OF PRESENT ILLNESS:  Colonoscopy for ileitis per CT imaging.   Seen in clinic on 10/16.   Normal fecal calprotectin.   CT Enterography showed nonspecific wall thickening of cecum and proximal ascending colon.     Past Medical History:   Diagnosis Date    Abdominal pain     pt states normal gastric emptying study, lost 40 lbs since 1/2023, being referred to GI    ADHD (attention deficit hyperactivity disorder)     Bipolar 1 disorder     Depression     Endometriosis     GERD (gastroesophageal reflux disease)     Hypertension     Hypothyroid     Migraines     Nausea and vomiting 05/02/2022    Ovarian cyst     Psychosis     PTSD (post-traumatic stress disorder)      Past Surgical History:   Procedure Laterality Date    APPENDECTOMY      CHOLECYSTECTOMY WITH INTRAOPERATIVE CHOLANGIOGRAM N/A 04/14/2022    Procedure: CHOLECYSTECTOMY LAPAROSCOPIC INTRAOPERATIVE CHOLANGIOGRAM;  Surgeon: Mimi Sahu DO;  Location: [facility] OR;  Service: General;  Laterality: N/A;    COLONOSCOPY      COLONOSCOPY W/ POLYPECTOMY N/A 05/24/2022    Procedure: Colonoscopy with polypectomy;  Surgeon: Mimi Sahu DO;  Location: [facility] OR;  Service: Gastroenterology;  Laterality: N/A;  rectal polyp    DIAGNOSTIC LAPAROSCOPY N/A 01/12/2023    Procedure: DIAGNOSTIC LAPAROSCOPY, left oophorectomy;  Surgeon: John Pro MD;  Location: [facility] OR;  Service: Obstetrics/Gynecology;  Laterality: N/A;    DIAGNOSTIC LAPAROSCOPY Right 5/2/2023    Procedure: DIAGNOSTIC LAPAROSCOPY, right salpingoophorectomy;  Surgeon: Irene Aguilar MD;  Location: [facility] OR;  Service: Obstetrics/Gynecology;  Laterality: Right;    ENDOSCOPY N/A 05/24/2022    Procedure: Esophagogastroduodenoscopy with biopsy;  Surgeon: Mimi Sahu DO;  Location: [facility] OR;  Service: Gastroenterology;  Laterality:  N/A;  gastritis  CAMRYN test    ENDOSCOPY N/A 2023    Procedure: ESOPHAGOGASTRODUODENOSCOPY WITH BIOPSY;  Surgeon: Jeyson Levine MD;  Location: [facility];  Service: Gastroenterology;  Laterality: N/A;  gastritis- biopsy for H.pylori    HYSTERECTOMY      OOPHORECTOMY       Family History   Problem Relation Age of Onset    Heart disease Mother 44    Migraines Mother     Breast cancer Mother     Heart attack Mother 44    Heart disease Father 46    Hypertension Father     Colon cancer Father     Kidney disease Father     Heart attack Father 65    Stroke Father     Diabetes Father     Aneurysm Paternal Grandmother     Malig Hyperthermia Neg Hx      Social History     Tobacco Use    Smoking status: Former     Packs/day: 1.00     Years: 5.00     Additional pack years: 0.00     Total pack years: 5.00     Types: Cigarettes     Quit date: 2022     Years since quittin.7    Smokeless tobacco: Never    Tobacco comments:     caff use   Vaping Use    Vaping Use: Never used   Substance Use Topics    Alcohol use: No    Drug use: No     Medications Prior to Admission   Medication Sig Dispense Refill Last Dose    ALPRAZolam (XANAX) 0.25 MG tablet Take 1 tablet by mouth 2 (Two) Times a Day. Take dos   2023    ARIPiprazole (ABILIFY) 10 MG tablet Take 1 tablet by mouth Daily.   Past Week    FLUoxetine (PROzac) 40 MG capsule Take 1 capsule by mouth Daily. Take dos   Past Week    folic acid (FOLVITE) 1 MG tablet Take 1 tablet by mouth Daily. 90 tablet 3 Past Week    gabapentin (Neurontin) 100 MG capsule Begin at 1 tablet nightly for 3 days, then 1 tablet twice daily for 3 days, then 1 tablet 3 times daily. 90 capsule 2 2023    Humira 40 MG/0.8ML Prefilled Syringe Kit injection Inject 0.8 mL under the skin into the appropriate area as directed Every 14 (Fourteen) Days.   Past Week    levothyroxine (SYNTHROID, LEVOTHROID) 125 MCG tablet Take 1 tablet by mouth Every Morning. Take dos   Past Week    linaclotide  (Linzess) 145 MCG capsule capsule Take 1 capsule by mouth Every Morning Before Breakfast. 30 capsule 11 Past Week    mineral oil enema Insert 1 each into the rectum Daily As Needed for Constipation. 10 each 3     Multiple Vitamins-Minerals (multivitamin with iron-minerals, CENTRUM,) liquid Take 10 mL by mouth Daily. 236 mL 11 Past Week    omeprazole (priLOSEC) 40 MG capsule Take 1 capsule by mouth 2 (Two) Times a Day Before Meals. Take dos 180 capsule 3 Past Week    ondansetron ODT (ZOFRAN-ODT) 8 MG disintegrating tablet Place 1 tablet on the tongue Every 8 (Eight) Hours As Needed for Nausea or Vomiting. 270 tablet 3 Past Week    potassium chloride (MICRO-K) 10 MEQ CR capsule    11/12/2023    promethazine (PHENERGAN) 25 MG suppository Insert 1 suppository into the rectum Every 6 (Six) Hours As Needed for Nausea or Vomiting. 360 suppository 3     SUMAtriptan (IMITREX) 50 MG tablet Take 1 tablet by mouth 1 (One) Time As Needed.   Past Week    topiramate (TOPAMAX) 100 MG tablet Take 1 tablet by mouth 2 (Two) Times a Day. Take dos   11/12/2023    traMADol (ULTRAM) 50 MG tablet Take 1 tablet by mouth Every 6 (Six) Hours As Needed.   Past Week    vitamin E 400 UNIT capsule Take 2 capsules by mouth Daily. 60 capsule 11 Past Week     Allergies:  Sulfa antibiotics    REVIEW OF SYSTEMS:  Please see the above history of present illness for pertinent positives and negatives.  The remainder of the patient's systems have been reviewed and are negative.     Vital Signs  Temp:  [98.2 °F (36.8 °C)] 98.2 °F (36.8 °C)  Heart Rate:  [93] 93  Resp:  [12] 12  BP: (114)/(76) 114/76    Flowsheet Rows      Flowsheet Row First Filed Value   Admission Height --   Admission Weight 54.9 kg (121 lb) Documented at 11/13/2023 0824             Physical Exam:  Physical Exam   Constitutional: Patient appears well-developed and well-nourished and in no acute distress   HEENT:   Head: Normocephalic and atraumatic.   Eyes:  Pupils are equal, round, and  reactive to light. EOM are intact. Sclerae are anicteric and non-injected.  Mouth and Throat: Patient has moist mucous membranes. Oropharynx is clear of any erythema or exudate.     Neck: Neck supple. No JVD present. No thyromegaly present. No lymphadenopathy present.  Cardiovascular: Regular rate, regular rhythm, S1 normal and S2 normal.  Exam reveals no gallop and no friction rub.  No murmur heard.  Pulmonary/Chest: Lungs are clear to auscultation bilaterally. No respiratory distress. No wheezes. No rhonchi. No rales.   Abdominal: Soft. Bowel sounds are normal. No distension and no mass. There is no hepatosplenomegaly. There is no tenderness.   Musculoskeletal: Normal Muscle tone  Extremities: No edema. Pulses are palpable in all 4 extremities.  Neurological: Patient is alert and oriented to person, place, and time. Cranial nerves II-XII are grossly intact with no focal deficits.  Skin: Skin is warm. No rash noted. Nails show no clubbing.  No cyanosis or erythema.    Debilities/Disabilities Identified: None  Emotional Behavior: Appropriate     Results Review:   I reviewed the patient's new clinical results.    Lab Results (most recent)       None            Imaging Results (Most Recent)       None          reviewed    ECG/EMG Results (most recent)       None          reviewed    Assessment & Plan   Ileitis per CT imaging /  colonoscopy      I discussed the patient's findings and my recommendations with patient.     Jeyson Levine MD  11/13/23  09:20 EST    Time: 10 min prior to procedure.

## 2023-11-13 NOTE — ANESTHESIA PREPROCEDURE EVALUATION
Anesthesia Evaluation     Patient summary reviewed and Nursing notes reviewed   no history of anesthetic complications:   NPO Solid Status: > 8 hours  NPO Liquid Status: > 8 hours           Airway   Mallampati: I  TM distance: >3 FB  Neck ROM: full  No difficulty expected  Dental    (+) edentulous    Pulmonary - normal exam    breath sounds clear to auscultation  (+) a smoker (Former - 5 pack years) Former,  (-) COPD, asthma  Cardiovascular - normal exam  Exercise tolerance: good (4-7 METS)    ECG reviewed  Rhythm: regular  Rate: normal    (-) hypertension, CADAngina: Happens 3-4 x per week.    ROS comment: 4/28/23 ECG    HEART RATE= 48  bpm  RR Interval= 1260  ms  IA Interval= 181  ms  P Horizontal Axis= 35  deg  P Front Axis= 49  deg  QRSD Interval= 108  ms  QT Interval= 473  ms  QRS Axis= -16  deg  T Wave Axis= 24  deg  - OTHERWISE NORMAL ECG -  Sinus bradycardia  Borderline left axis deviation  ST elev, probable normal early repol pattern  When compared with ECG of 11-Apr-2022 9:31:11,  No significant change     ECHO 5/6/19    Clinical Indication    Palpitations  Dx: Palpitations [R00.2 (ICD-10-CM)]; Dizziness [R42 (ICD-10-CM)]; Precordial pain [R07.2 (ICD-10-CM)]; Bradycardia, sinus [R00.1 (ICD-10-CM)]    Interpretation Summary    · Normal echocardiogram.  · Left ventricular systolic function is normal. Calculated EF = 60%. Estimated EF was in agreement with the calculated EF. Normal left ventricular cavity size and wall thickness noted. All left ventricular wall segments contract normally. Left ventricular diastolic function is normal.      Neuro/Psych  (+) headaches, numbness (Hands,finger, feet, toes), psychiatric history Anxiety, Depression and Bipolar  (-) seizures  GI/Hepatic/Renal/Endo    (+) GERD well controlled, GI bleeding upper and lower active bleeding, thyroid problem hypothyroidism  (-) hepatitis, liver disease, no renal disease    ROS Comment: Hx of:  N/V, diarrhea, can't keep food  "down  Dyspepsia Helicobacter pylori gastritis   Hematemesis with nausea   \"Swollen pituitary gland\"    Musculoskeletal     Abdominal    Substance History - negative use     OB/GYN negative ob/gyn ROS         Other - negative ROS       (-) blood dyscrasia, history of cancer                    Anesthesia Plan    ASA 2     MAC     intravenous induction     Anesthetic plan, risks, benefits, and alternatives have been provided, discussed and informed consent has been obtained with: patient.    Use of blood products discussed with patient  Consented to blood products.    Plan discussed with CRNA.        CODE STATUS:         "

## 2023-11-13 NOTE — ANESTHESIA POSTPROCEDURE EVALUATION
Patient: Aruna Hammonds    Procedure Summary       Date: 11/13/23 Room / Location: [facility] ENDOSCOPY 2 /  [facility] OR    Anesthesia Start: 0923 Anesthesia Stop: 0955    Procedure: COLONOSCOPY WITH BIOPSY Diagnosis:       Ileitis      (Ileitis [K52.9])    Surgeons: Jeyson Levine MD Provider:     Anesthesia Type: MAC ASA Status: 2            Anesthesia Type: MAC    Vitals  Vitals Value Taken Time   /69 11/13/23 1010   Temp     Pulse 67 11/13/23 1013   Resp 10 11/13/23 1010   SpO2 85 % 11/13/23 1013   Vitals shown include unfiled device data.        Post Anesthesia Care and Evaluation    Patient location during evaluation: PHASE II  Patient participation: complete - patient participated  Level of consciousness: awake and alert  Pain score: 0  Pain management: satisfactory to patient    Airway patency: patent  Anesthetic complications: No anesthetic complications  PONV Status: none  Cardiovascular status: acceptable  Respiratory status: acceptable  Hydration status: acceptable

## 2023-11-14 ENCOUNTER — TELEPHONE (OUTPATIENT)
Dept: NEUROLOGY | Facility: CLINIC | Age: 43
End: 2023-11-14

## 2023-11-14 LAB
LAB AP CASE REPORT: NORMAL
LAB AP DIAGNOSIS COMMENT: NORMAL
PATH REPORT.FINAL DX SPEC: NORMAL
PATH REPORT.GROSS SPEC: NORMAL

## 2023-11-14 NOTE — TELEPHONE ENCOUNTER
Provider: KRYSTAL LOPEZ MD    Caller: LILLIE     Relationship to Patient: SELF    Phone Number: [phone]    Reason for Call: STATED [facility] FOR DR. ALBERT HAS NOT RECEIVED THE REFERRAL.   PLEASE RESEND      PLEASE ADVISE

## 2023-11-16 ENCOUNTER — TELEPHONE (OUTPATIENT)
Dept: GASTROENTEROLOGY | Facility: CLINIC | Age: 43
End: 2023-11-16
Payer: MEDICARE

## 2023-11-16 NOTE — PROGRESS NOTES
- For ileitis per CT imaging.  - Findings/path: normal terminal ileum (biopsied -- negative for ileitis), external hemorrhoids   - POC: No inflammation of terminal ileum, cecum, or ascending colon seen. Low suspicion for IBD given normal endoscopic findings. Repeat colonoscopy in 10 years for surveillance.

## 2023-11-20 NOTE — TELEPHONE ENCOUNTER
Caller: Aruna Hammonds    Relationship: Self    Best call back number:   Telephone Information:   Mobile [phone]       What is the medical concern/diagnosis: G61.82 (ICD-10-CM) - MMN (multifocal motor neuropathy)     What specialty or service is being requested: NEUROLOGY    What is the provider, practice or medical service name:     Any additional details: THEY STATE SECOND FAX WAS NOT RECEIVED EITHER.

## 2023-11-21 ENCOUNTER — TELEPHONE (OUTPATIENT)
Dept: GASTROENTEROLOGY | Facility: CLINIC | Age: 43
End: 2023-11-21

## 2023-11-21 NOTE — TELEPHONE ENCOUNTER
Per Results: Findings/path: normal terminal ileum (biopsied -- negative for ileitis), external hemorrhoids  - POC: No inflammation of terminal ileum, cecum, or ascending colon seen. Low suspicion for IBD given normal endoscopic findings. Repeat colonoscopy in 10 years for surveillance.

## 2023-11-21 NOTE — TELEPHONE ENCOUNTER
Patient called and would like to speak with someone about her colonoscopy results.  Callback number: [phone]

## 2023-11-27 ENCOUNTER — TELEPHONE (OUTPATIENT)
Dept: NEUROLOGY | Facility: CLINIC | Age: 43
End: 2023-11-27
Payer: MEDICARE

## 2023-11-27 RX ORDER — AMITRIPTYLINE HYDROCHLORIDE 25 MG/1
25 TABLET, FILM COATED ORAL NIGHTLY
Qty: 30 TABLET | Refills: 4 | Status: SHIPPED | OUTPATIENT
Start: 2023-11-27

## 2023-11-27 NOTE — TELEPHONE ENCOUNTER
Received call from PT. She states that she has called 8 people. If she calls the ambulance to come and get her she doesn't have anyone to take her back home. Is there any way Dr De La Torre can call her in something? That way her mom can pick it up on her way back home. I let her know that I have relayed the original message to Dr De La Torre and I am waiting for a response. V/u

## 2023-11-27 NOTE — TELEPHONE ENCOUNTER
"RED FLAG    Caller: Nasra Aruna R    Relationship to patient: Self    Best call back number: [phone]    Chief complaint: PT REPORTS THAT THE NERVE PAIN IN HER ARMS AND LEGS HAS WORSENED SIGNIFICANTLY. PT STATES \"SHE FEELS LIKE SHE IS GOING TO DIE\". SHE DESCRIBES THE PAIN AS NEEDLE-LIKE AND STATES IT KEEPS HER AWAKE AT NIGHT. PT STATES SHE FELL YESTERDAY BECAUSE OF THE PAIN AND REPORTS WALKING DIFFICULTY. SHE WAS CALLING TO ASK IF DR. LOPEZ COULD PRESCRIBE SOMETHING FOR HER PAIN AS SHE CANNOT GET IN TO SEE [facility] UNTIL JANUARY 2024.    I ADVISED PT DUE TO THE SIGNIFICANT REPORT OF HER PAIN, SHE SHOULD REPORT TO THE NEAREST ED FOR FURTHER EVALUATION. .PBU    Patient directed to call 911 or go to their nearest emergency room.     Patient verbalized understanding: [x] Yes  [] No    Additional notes: PT STILL ASKS IF DR. LOPEZ HAS ANY RECOMMENDATIONS WHILE WAITING TO GET IN TO SEE [facility]. PT'S PREFERRED PHARMACY IS [phone] [place] [facility] [address] [facility] [address] & [facility] - [phone] [facility] [phone] FX.    PLEASE REVIEW AND ADVISE.  "

## 2023-11-27 NOTE — TELEPHONE ENCOUNTER
Called PT to relay Dr De La Torre's recommendation of amitriptyline and verified [facility] pharmacy in [place]. PT states she is taking 300 mg gabapentin 3x daily as previously ordered.

## 2023-12-27 ENCOUNTER — TELEPHONE (OUTPATIENT)
Dept: NEUROLOGY | Facility: CLINIC | Age: 43
End: 2023-12-27
Payer: MEDICARE

## 2023-12-27 NOTE — TELEPHONE ENCOUNTER
Caller: Aruna Hammonds    Relationship: Self    Best call back number: [phone]    What form or medical record are you requesting: LETTER THAT STATES SHE IS UNABLE TO WORK WITH HER CURRENT CONDITION    Who is requesting this form or medical record from you: ERNESTO    How would you like to receive the form or medical records (pick-up, mail, fax): PICK-UP    Timeframe paperwork needed:ASAP    Additional notes: PATIENT IS TRYING TO GET FOOD [employer]AM[employer] AND DISABILITY AND THEY ARE REQUESTING A LETTER FROM HER STATING THAT SHE IS UNABLE TO WORK WITH HER CURRENT CONDITION. PLEASE ADVISE.

## 2023-12-28 NOTE — TELEPHONE ENCOUNTER
Called Pt and let her know that a letter is being populated and she can pick it up later today. V/u.     Letter has been populated and saved in Media

## 2023-12-29 NOTE — TELEPHONE ENCOUNTER
Caller: Aruna Hammonds    Relationship: Self    Best call back number: [phone]     What is the best time to reach you: ANY    Who are you requesting to speak with (clinical staff, provider,  specific staff member): STAFF    What was the call regarding: PATIENT WOULD LIKE DISABILITY NOTE TO BE FAXED -[phone]    PLEASE FAX OR CALL TO ADVISE-THANK YOU

## 2023-12-29 NOTE — TELEPHONE ENCOUNTER
Called Pt to let her know that I wasn't sure if I could fax the letter to an unknown fax number due to HIPAA. That I would have to check with OM when she returns on Tuesday. Pt states that she may come and get it if she can find transportation. She will call back.

## 2024-06-10 ENCOUNTER — TELEPHONE (OUTPATIENT)
Dept: NEUROLOGY | Facility: CLINIC | Age: 44
End: 2024-06-10
Payer: MEDICARE

## 2024-06-10 NOTE — TELEPHONE ENCOUNTER
06/10/2024  Spoke to Tomy at Dr Marie's office, told her that we have treated Aruna Hammonds in the past and that Dr De La Torre has referred her to Dr Potter at [facility], we are going to refuse the referral from Dr Marie, Jaime said she would let Dr Marie know that we will not be calling the patient to schedule with Dr De La Torre

## 2024-12-12 ENCOUNTER — OFFICE VISIT (OUTPATIENT)
Dept: OBSTETRICS AND GYNECOLOGY | Facility: CLINIC | Age: 44
End: 2024-12-12
Payer: MEDICARE

## 2024-12-12 VITALS
BODY MASS INDEX: 21.33 KG/M2 | DIASTOLIC BLOOD PRESSURE: 62 MMHG | SYSTOLIC BLOOD PRESSURE: 110 MMHG | WEIGHT: 144 LBS | HEIGHT: 69 IN

## 2024-12-12 DIAGNOSIS — E89.41 HOT FLASHES DUE TO SURGICAL MENOPAUSE: ICD-10-CM

## 2024-12-12 DIAGNOSIS — Z79.899 HIGH RISK MEDICATION USE: ICD-10-CM

## 2024-12-12 DIAGNOSIS — R31.9 HEMATURIA, UNSPECIFIED TYPE: Primary | ICD-10-CM

## 2024-12-12 DIAGNOSIS — N95.2 VAGINAL ATROPHY: ICD-10-CM

## 2024-12-12 DIAGNOSIS — N90.7 SEBACEOUS CYST OF LABIA: ICD-10-CM

## 2024-12-12 DIAGNOSIS — G43.109 MIGRAINE WITH AURA AND WITHOUT STATUS MIGRAINOSUS, NOT INTRACTABLE: ICD-10-CM

## 2024-12-12 DIAGNOSIS — N89.8 VAGINAL DISCHARGE: ICD-10-CM

## 2024-12-12 DIAGNOSIS — Z13.89 SCREENING FOR GENITOURINARY CONDITION: ICD-10-CM

## 2024-12-12 DIAGNOSIS — R23.2 HOT FLASHES: ICD-10-CM

## 2024-12-12 LAB
ALBUMIN SERPL-MCNC: 4.1 G/DL (ref 3.5–5.2)
ALBUMIN/GLOB SERPL: 1.2 G/DL
ALP SERPL-CCNC: 161 U/L (ref 39–117)
ALT SERPL-CCNC: 16 U/L (ref 1–33)
AST SERPL-CCNC: 20 U/L (ref 1–32)
BILIRUB BLD-MCNC: NEGATIVE MG/DL
BILIRUB SERPL-MCNC: 0.4 MG/DL (ref 0–1.2)
BUN SERPL-MCNC: 6 MG/DL (ref 6–20)
BUN/CREAT SERPL: 6.5 (ref 7–25)
CALCIUM SERPL-MCNC: 9.8 MG/DL (ref 8.6–10.5)
CHLORIDE SERPL-SCNC: 107 MMOL/L (ref 98–107)
CLARITY, POC: CLEAR
CO2 SERPL-SCNC: 26.3 MMOL/L (ref 22–29)
COLOR UR: YELLOW
CREAT SERPL-MCNC: 0.92 MG/DL (ref 0.57–1)
EGFRCR SERPLBLD CKD-EPI 2021: 78.9 ML/MIN/1.73
GLOBULIN SER CALC-MCNC: 3.4 GM/DL
GLUCOSE SERPL-MCNC: 85 MG/DL (ref 65–99)
GLUCOSE UR STRIP-MCNC: NEGATIVE MG/DL
KETONES UR QL: NEGATIVE
LEUKOCYTE EST, POC: NEGATIVE
NITRITE UR-MCNC: NEGATIVE MG/ML
PH UR: 5 [PH] (ref 5–8)
POTASSIUM SERPL-SCNC: 4.1 MMOL/L (ref 3.5–5.2)
PROT SERPL-MCNC: 7.5 G/DL (ref 6–8.5)
PROT UR STRIP-MCNC: NEGATIVE MG/DL
RBC # UR STRIP: ABNORMAL /UL
SODIUM SERPL-SCNC: 141 MMOL/L (ref 136–145)
SP GR UR: 1 (ref 1–1.03)
T4 FREE SERPL-MCNC: 1.69 NG/DL (ref 0.93–1.7)
TSH SERPL DL<=0.005 MIU/L-ACNC: <0.005 UIU/ML (ref 0.27–4.2)
UROBILINOGEN UR QL: NORMAL

## 2024-12-12 RX ORDER — TOPIRAMATE 100 MG/1
TABLET, FILM COATED ORAL EVERY 12 HOURS SCHEDULED
COMMUNITY

## 2024-12-12 RX ORDER — LINACLOTIDE 290 UG/1
290 CAPSULE, GELATIN COATED ORAL DAILY
COMMUNITY

## 2024-12-12 RX ORDER — LAMOTRIGINE 200 MG/1
TABLET ORAL
COMMUNITY
Start: 2024-12-11

## 2024-12-12 RX ORDER — OMEPRAZOLE 40 MG/1
CAPSULE, DELAYED RELEASE ORAL EVERY 12 HOURS SCHEDULED
COMMUNITY

## 2024-12-12 RX ORDER — ESTRADIOL 0.1 MG/G
1 CREAM VAGINAL 2 TIMES WEEKLY
Qty: 45 G | Refills: 6 | Status: SHIPPED | OUTPATIENT
Start: 2024-12-12

## 2024-12-12 RX ORDER — EZETIMIBE 10 MG/1
10 TABLET ORAL DAILY
COMMUNITY

## 2024-12-12 RX ORDER — ROSUVASTATIN CALCIUM 20 MG/1
20 TABLET, COATED ORAL DAILY
COMMUNITY
Start: 2023-12-23

## 2024-12-12 RX ORDER — GABAPENTIN 800 MG/1
1 TABLET ORAL 3 TIMES DAILY
COMMUNITY
Start: 2024-12-10

## 2024-12-12 RX ORDER — PROMETHAZINE HYDROCHLORIDE 25 MG/1
TABLET ORAL
COMMUNITY
Start: 2024-10-20

## 2024-12-12 RX ORDER — SUCRALFATE 1 G/1
TABLET ORAL
COMMUNITY
Start: 2024-12-11

## 2024-12-12 RX ORDER — POTASSIUM CHLORIDE 750 MG/1
CAPSULE, EXTENDED RELEASE ORAL EVERY 12 HOURS SCHEDULED
COMMUNITY

## 2024-12-12 RX ORDER — DICYCLOMINE HCL 20 MG
1 TABLET ORAL EVERY 12 HOURS SCHEDULED
COMMUNITY
Start: 2024-11-26

## 2024-12-12 RX ORDER — PREGABALIN 150 MG/1
150 CAPSULE ORAL 2 TIMES DAILY
COMMUNITY
Start: 2024-01-26

## 2024-12-12 RX ORDER — CETIRIZINE HYDROCHLORIDE 10 MG/1
1 TABLET ORAL DAILY
COMMUNITY

## 2024-12-12 RX ORDER — ATORVASTATIN CALCIUM 40 MG/1
40 TABLET, FILM COATED ORAL DAILY
COMMUNITY

## 2024-12-12 RX ORDER — ALPRAZOLAM 1 MG/1
1 TABLET ORAL EVERY 8 HOURS PRN
COMMUNITY

## 2024-12-12 RX ORDER — LITHIUM CARBONATE 450 MG
450 TABLET, EXTENDED RELEASE ORAL
COMMUNITY

## 2024-12-12 RX ORDER — OLANZAPINE 15 MG/1
1 TABLET ORAL DAILY
COMMUNITY

## 2024-12-12 RX ORDER — CARIPRAZINE 1.5 MG/1
1.5 CAPSULE, GELATIN COATED ORAL DAILY
COMMUNITY

## 2024-12-12 RX ORDER — LEVOTHYROXINE SODIUM 50 UG/1
TABLET ORAL EVERY 24 HOURS
COMMUNITY

## 2024-12-12 NOTE — PROGRESS NOTES
"Aruna Hammonds is a 44 y.o. patient who presents for follow up of   Chief Complaint   Patient presents with    Vaginal Discharge       43 yo est pt here for multiple issues. She has had a hysterectomy with an interval BSO for pelvic pain. She is having severe HF and has also noticed vaginal dryness and irritation. She has migraines with aura and can't take E2. She is on several SSRIs already. We discussed Veozah and she is interested and we will check a CMP and have her back for an annual and to discuss treatment options. She is in a SSR for the past 17 years. She is UTD with MMG at [facility] and we will get those results.       The following portions of the patient's history were reviewed and updated as appropriate: allergies, current medications and problem list.    Review of Systems   Constitutional:  Positive for activity change.   Endocrine: Positive for heat intolerance.   Genitourinary:  Positive for genital sores, vaginal discharge and vaginal pain.       /62   Ht 175.3 cm (69\")   Wt 65.3 kg (144 lb)   Breastfeeding No   BMI 21.27 kg/m²     Physical Exam  Vitals and nursing note reviewed. Exam conducted with a chaperone present.   Constitutional:       Appearance: Normal appearance. She is well-developed.   HENT:      Head: Normocephalic and atraumatic.   Eyes:      General: No scleral icterus.     Conjunctiva/sclera: Conjunctivae normal.   Neck:      Thyroid: No thyromegaly.   Abdominal:      General: There is no distension.      Palpations: Abdomen is soft. There is no mass.      Tenderness: There is no abdominal tenderness. There is no guarding or rebound.      Hernia: No hernia is present.   Genitourinary:     Labia:         Right: Lesion present. No rash, tenderness or injury.         Left: Lesion present. No rash, tenderness or injury.       Vagina: Vaginal discharge present.      Uterus: Absent.       Adnexa: Right adnexa normal and left adnexa normal.          Comments: Uterus and cervix " absent  Normal cuff    Skin:     General: Skin is warm and dry.   Neurological:      Mental Status: She is alert and oriented to person, place, and time.   Psychiatric:         Mood and Affect: Mood normal.         Behavior: Behavior normal.         Thought Content: Thought content normal.         Judgment: Judgment normal.         A/P:  1. Vaginal discharge- check NUSwab Y/M/L/B  2. Hematuria- check UA and CS  3. Get MMG report [facility]  4. Migraines with aura are a contraindication to birth control containing estrogen or hormone replacement therapies that contain estrogen.  These drugs can increase the risk of stroke for those with these types of migraines, even in a very young and otherwise healthy patient.  Birth control methods that contain estrogen include birth control pills, patches and rings.   5. HR medication use- check CMP and consider Veozah  6. Sebaceous cysts of vulva- d/w pt B9 nature of cysts. Can have them removed if they are growing, bothersome or become infected. She was concerned that they were psoriasis.   7. Vaginal atrophy- ERX Estrace cream. Patient counseled that vaginal estrogen rings, creams and tablets are available and highly effective at treating local vaginal symptoms such as atrophy and vaginal dryness.  Vaginal estrogen does not cause uterine overgrowth and does not require a progestogen to protect the uterus.  Very small amounts of estrogen are absorbed systemically.  For patients with a history of an estrogen dependent cancer such as breast cancer, the decision to use local estrogen for local vaginal symptoms should be made after consultation with her oncologist.  Possible side effects include local irritation or burning and/or vaginal bleeding and should always be reported.    8. Hot flashes- pt has had a surgical menopause. Check TSH as well  9. RTO for annual and discussion of Veozah.   10.EPIC LOS calculator used to determine coding level.         Assessment & Plan   Diagnoses and  all orders for this visit:    1. Hematuria, unspecified type (Primary)  -     Urine Culture - Urine, Urine, Random Void  -     Urinalysis With Microscopic - Urine, Random Void    2. Screening for genitourinary condition  -     POC Urinalysis Dipstick    3. Hot flashes due to surgical menopause    4. Migraine with aura and without status migrainosus, not intractable    5. Vaginal discharge  -     NuSwab VG+ - Swab, Vagina  -     Genital Mycoplasmas SHERRI, Swab - Swab, Vagina    6. Vaginal atrophy    7. Sebaceous cyst of labia    8. High risk medication use  -     TSH Rfx On Abnormal To Free T4  -     Comprehensive Metabolic Panel    9. Hot flashes  -     TSH Rfx On Abnormal To Free T4  -     Comprehensive Metabolic Panel    Other orders  -     estradiol (ESTRACE) 0.1 MG/GM vaginal cream; Insert 1 g into the vagina 2 (Two) Times a Week.  Dispense: 45 g; Refill: 6                 No follow-ups on file.      Irene Aguilar MD    12/12/2024  12:20 EST

## 2024-12-13 ENCOUNTER — LAB (OUTPATIENT)
Dept: OBSTETRICS AND GYNECOLOGY | Facility: CLINIC | Age: 44
End: 2024-12-13
Payer: MEDICARE

## 2024-12-13 DIAGNOSIS — L29.9 PRURITUS, UNSPECIFIED: ICD-10-CM

## 2024-12-13 DIAGNOSIS — E28.319 EARLY ONSET MENOPAUSE: ICD-10-CM

## 2024-12-13 DIAGNOSIS — E89.41 HOT FLASHES DUE TO SURGICAL MENOPAUSE: Primary | ICD-10-CM

## 2024-12-13 NOTE — PROGRESS NOTES
PIP= TSH is normal. Please add on full thyroid panel and TPO to the blood in the lab or have her come in for a new lab draw.

## 2024-12-14 LAB
APPEARANCE UR: CLEAR
BACTERIA #/AREA URNS HPF: NORMAL /HPF
BACTERIA UR CULT: NO GROWTH
BACTERIA UR CULT: NORMAL
BILIRUB UR QL STRIP: NEGATIVE
CASTS URNS MICRO: NORMAL
COLOR UR: YELLOW
EPI CELLS #/AREA URNS HPF: NORMAL /HPF
GLUCOSE UR QL STRIP: NEGATIVE
HGB UR QL STRIP: ABNORMAL
KETONES UR QL STRIP: NEGATIVE
LEUKOCYTE ESTERASE UR QL STRIP: ABNORMAL
NITRITE UR QL STRIP: NEGATIVE
PH UR STRIP: 6 [PH] (ref 5–8)
PROT UR QL STRIP: NEGATIVE
RBC #/AREA URNS HPF: NORMAL /HPF
SP GR UR STRIP: 1.01 (ref 1–1.03)
T3 SERPL-MCNC: 186 NG/DL (ref 80–200)
T4 FREE SERPL-MCNC: 1.62 NG/DL (ref 0.92–1.68)
THYROPEROXIDASE AB SERPL-ACNC: 11 IU/ML (ref 0–34)
TSH SERPL DL<=0.005 MIU/L-ACNC: <0.005 UIU/ML (ref 0.27–4.2)
UROBILINOGEN UR STRIP-MCNC: ABNORMAL MG/DL
WBC #/AREA URNS HPF: NORMAL /HPF

## 2024-12-16 NOTE — PROGRESS NOTES
PIP= TSH is very low, although other thyroid tests are in range. She needs to see her primary care MD for further evaluation of her thyroid

## 2024-12-17 LAB
A VAGINAE DNA VAG QL NAA+PROBE: ABNORMAL SCORE
BVAB2 DNA VAG QL NAA+PROBE: ABNORMAL SCORE
C ALBICANS DNA VAG QL NAA+PROBE: NEGATIVE
C GLABRATA DNA VAG QL NAA+PROBE: NEGATIVE
C TRACH DNA SPEC QL NAA+PROBE: NEGATIVE
M GENITALIUM DNA SPEC QL NAA+PROBE: NEGATIVE
M HOMINIS DNA SPEC QL NAA+PROBE: NEGATIVE
MEGA1 DNA VAG QL NAA+PROBE: ABNORMAL SCORE
N GONORRHOEA DNA VAG QL NAA+PROBE: NEGATIVE
T VAGINALIS DNA VAG QL NAA+PROBE: NEGATIVE
UREAPLASMA DNA SPEC QL NAA+PROBE: POSITIVE

## 2024-12-17 RX ORDER — DOXYCYCLINE HYCLATE 100 MG
100 TABLET ORAL 2 TIMES DAILY
Qty: 14 TABLET | Refills: 0 | Status: SHIPPED | OUTPATIENT
Start: 2024-12-17 | End: 2024-12-24

## 2024-12-17 RX ORDER — METRONIDAZOLE 7.5 MG/G
1 GEL VAGINAL NIGHTLY
Qty: 70 G | Refills: 0 | Status: SHIPPED | OUTPATIENT
Start: 2024-12-17 | End: 2024-12-22

## 2025-01-23 ENCOUNTER — TELEPHONE (OUTPATIENT)
Dept: OBSTETRICS AND GYNECOLOGY | Facility: CLINIC | Age: 45
End: 2025-01-23

## 2025-01-23 NOTE — TELEPHONE ENCOUNTER
Caller: Aruna Hammonds    Relationship:  Self    Best call back number: [phone]    PATIENT CALLED REQUESTING TO CANCEL SAME DAY APPT.    Did the patient call AFTER the start time of their scheduled appointment?  []YES  [x]NO    Was the patient's appointment rescheduled? [x]YES  []NO    Any additional information: HAS NO GAS UBALDO TO MAKE IT IT APPT TODAY

## 2025-02-26 ENCOUNTER — HOSPITAL ENCOUNTER (OUTPATIENT)
Dept: GENERAL RADIOLOGY | Facility: HOSPITAL | Age: 45
Discharge: HOME | End: 2025-02-26
Admitting: STUDENT IN AN ORGANIZED HEALTH CARE EDUCATION/TRAINING PROGRAM
Payer: MEDICARE

## 2025-02-26 ENCOUNTER — TELEPHONE (OUTPATIENT)
Dept: GASTROENTEROLOGY | Facility: CLINIC | Age: 45
End: 2025-02-26

## 2025-02-26 ENCOUNTER — OFFICE VISIT (OUTPATIENT)
Dept: GASTROENTEROLOGY | Facility: CLINIC | Age: 45
End: 2025-02-26
Payer: MEDICARE

## 2025-02-26 VITALS
DIASTOLIC BLOOD PRESSURE: 62 MMHG | HEIGHT: 69 IN | BODY MASS INDEX: 21.24 KG/M2 | WEIGHT: 143.4 LBS | SYSTOLIC BLOOD PRESSURE: 100 MMHG

## 2025-02-26 DIAGNOSIS — K58.2 IRRITABLE BOWEL SYNDROME WITH BOTH CONSTIPATION AND DIARRHEA: Primary | ICD-10-CM

## 2025-02-26 DIAGNOSIS — K58.0 IRRITABLE BOWEL SYNDROME WITH DIARRHEA: Primary | ICD-10-CM

## 2025-02-26 DIAGNOSIS — K58.2 IRRITABLE BOWEL SYNDROME WITH BOTH CONSTIPATION AND DIARRHEA: ICD-10-CM

## 2025-02-26 DIAGNOSIS — R10.13 DYSPEPSIA: ICD-10-CM

## 2025-02-26 DIAGNOSIS — R11.2 NAUSEA AND VOMITING, UNSPECIFIED VOMITING TYPE: ICD-10-CM

## 2025-02-26 PROBLEM — R19.7 ACUTE DIARRHEA: Status: ACTIVE | Noted: 2025-02-26

## 2025-02-26 PROBLEM — R19.7 ACUTE DIARRHEA: Status: RESOLVED | Noted: 2025-02-26 | Resolved: 2025-02-26

## 2025-02-26 PROCEDURE — 74018 RADEX ABDOMEN 1 VIEW: CPT

## 2025-02-26 PROCEDURE — 1159F MED LIST DOCD IN RCRD: CPT | Performed by: STUDENT IN AN ORGANIZED HEALTH CARE EDUCATION/TRAINING PROGRAM

## 2025-02-26 PROCEDURE — 99214 OFFICE O/P EST MOD 30 MIN: CPT | Performed by: STUDENT IN AN ORGANIZED HEALTH CARE EDUCATION/TRAINING PROGRAM

## 2025-02-26 PROCEDURE — 1160F RVW MEDS BY RX/DR IN RCRD: CPT | Performed by: STUDENT IN AN ORGANIZED HEALTH CARE EDUCATION/TRAINING PROGRAM

## 2025-02-26 RX ORDER — VONOPRAZAN FUMARATE 26.72 MG/1
20 TABLET ORAL DAILY
Qty: 30 TABLET | Refills: 1 | Status: SHIPPED | OUTPATIENT
Start: 2025-02-26

## 2025-02-26 RX ORDER — PROCHLORPERAZINE MALEATE 10 MG
10 TABLET ORAL EVERY 6 HOURS PRN
Qty: 120 TABLET | Refills: 11 | Status: SHIPPED | OUTPATIENT
Start: 2025-02-26

## 2025-02-26 RX ORDER — VONOPRAZAN FUMARATE 26.72 MG/1
20 TABLET ORAL DAILY
Qty: 14 TABLET | Refills: 0 | COMMUNITY
Start: 2025-02-26

## 2025-02-26 RX ORDER — FLUOXETINE HYDROCHLORIDE 40 MG/1
1 CAPSULE ORAL EVERY 24 HOURS
COMMUNITY

## 2025-02-26 NOTE — ASSESSMENT & PLAN NOTE
- Obtain KUB to evaluate for colonic stool burden/encopresis as the cause  - If KUB shows colonic stool burden, discussed starting Ibsrela 50 mg BID for IBS-C   - If KUB shows no colonic stool burden, discussed starting Rifaximin 550 mg TID for IBS-D

## 2025-02-26 NOTE — ASSESSMENT & PLAN NOTE
- Inadequately controlled   - Transition to PRN Compazine 10 mg q6h   - Obtain GET to further evaluate

## 2025-02-26 NOTE — PROGRESS NOTES
Aruna Hammonds is a 44 y.o. female with PMH of H Pylori Gastritis, Chronic Idiopathic Constipation, GERD, s/p CCY, Psoriasis on Humira, Bipolar DO + noted below who presents with   Chief Complaint   Patient presents with    Abdominal Pain    Diarrhea    Vomiting       Subjective     # IBS-M  - Has been adherent to Linzess 145 mcg QD.   - Reports having diarrhea within the last 4 months further described as having watery diarrhea 2 to 3 times per day.   - Continues to be adherent to Linzess 145 mcg QD despite the diarrhea.     # Dyspepsia   # Nausea and Vomiting  - Endorses persistent post-prandial abdominal pain ongoing for 4 months associated with N/V.   . Adherent to Omeprazole 40 mg BID which was started in 12/2024 by OBGYN. Reports minimal relief with this regimen.   - No longer taking Elavil given minimal relief with use.   - Adherent to PRN Zofran but reports minimal relief with use.   - Denies NSAID use.   - GET and ARM were never done in the interim.  - C/s in 11/2023 had normal terminal ileum (biopsied -- negative for ileitis), external hemorrhoids   - EGD in 8/2023 had mild gastritis s/p bx negative for H pylori.   - Had gained 19 lbs since in 12/2024 since the time of our last appointment in 10/2023 (her weight was 125 lbs then).                 Past Medical History:   Diagnosis Date    Abdominal pain     pt states normal gastric emptying study, lost 40 lbs since 1/2023, being referred to GI    ADHD (attention deficit hyperactivity disorder)     Bipolar 1 disorder     Colon polyp     Depression     Endometriosis     GERD (gastroesophageal reflux disease)     Hyperlipidemia     Hypertension     Hypothyroid     Migraines     with aura    Nausea and vomiting 05/02/2022    Ovarian cyst     Psychosis     PTSD (post-traumatic stress disorder)        Social History     Socioeconomic History    Marital status:    Tobacco Use    Smoking status: Every Day     Current packs/day: 0.00     Average packs/day:  1 pack/day for 5.0 years (5.0 ttl pk-yrs)     Types: Cigarettes     Start date: 2017     Last attempt to quit: 2024     Years since quittin.5    Smokeless tobacco: Never    Tobacco comments:     caff use   Vaping Use    Vaping status: Never Used   Substance and Sexual Activity    Alcohol use: No    Drug use: No    Sexual activity: Yes     Partners: Female     Birth control/protection: None, Hysterectomy, Partner of same sex         Current Outpatient Medications:     ALPRAZolam (XANAX) 1 MG tablet, Take 1 tablet by mouth Every 8 (Eight) Hours As Needed., Disp: , Rfl:     atorvastatin (LIPITOR) 40 MG tablet, Take 1 tablet by mouth Daily., Disp: , Rfl:     dicyclomine (BENTYL) 20 MG tablet, Take 1 tablet by mouth Every 12 (Twelve) Hours., Disp: , Rfl:     estradiol (ESTRACE) 0.1 MG/GM vaginal cream, Insert 1 g into the vagina 2 (Two) Times a Week., Disp: 45 g, Rfl: 6    ezetimibe (ZETIA) 10 MG tablet, Take 1 tablet by mouth Daily., Disp: , Rfl:     FLUoxetine (PROzac) 40 MG capsule, 1 capsule Daily., Disp: , Rfl:     gabapentin (NEURONTIN) 800 MG tablet, Take 1 tablet by mouth 3 times a day., Disp: , Rfl:     Humira 40 MG/0.8ML Prefilled Syringe Kit injection, Inject 0.8 mL under the skin into the appropriate area as directed Every 14 (Fourteen) Days., Disp: , Rfl:     levothyroxine (SYNTHROID, LEVOTHROID) 50 MCG tablet, Daily., Disp: , Rfl:     linaclotide (Linzess) 145 MCG capsule capsule, Take 1 capsule by mouth., Disp: , Rfl:     mineral oil enema, Insert 1 each into the rectum Daily As Needed for Constipation. (Patient taking differently: Insert 1 each into the rectum As Needed for Constipation.), Disp: 10 each, Rfl: 3    ondansetron ODT (ZOFRAN-ODT) 8 MG disintegrating tablet, Place 1 tablet on the tongue Every 8 (Eight) Hours As Needed for Nausea or Vomiting., Disp: 270 tablet, Rfl: 3    potassium chloride (MICRO-K) 10 MEQ CR capsule, Every 12 (Twelve) Hours., Disp: , Rfl:     SUMAtriptan (IMITREX)  50 MG tablet, Take 1 tablet by mouth 1 (One) Time As Needed., Disp: , Rfl:     topiramate (Topamax) 100 MG tablet, Every 12 (Twelve) Hours., Disp: , Rfl:     traMADol (ULTRAM) 50 MG tablet, Take 1 tablet by mouth Every 6 (Six) Hours As Needed., Disp: , Rfl:     amitriptyline (ELAVIL) 25 MG tablet, Take 1 tablet by mouth Every Night. (Patient not taking: Reported on 2/26/2025), Disp: 30 tablet, Rfl: 4    prochlorperazine (COMPAZINE) 10 MG tablet, Take 1 tablet by mouth Every 6 (Six) Hours As Needed for Nausea or Vomiting., Disp: 120 tablet, Rfl: 11    sucralfate (CARAFATE) 1 g tablet, , Disp: , Rfl:     Vonoprazan Fumarate (Voquezna) 20 MG tablet, Take 1 tablet by mouth Daily., Disp: 14 tablet, Rfl: 0    Vonoprazan Fumarate (Voquezna) 20 MG tablet, Take 1 tablet by mouth Daily., Disp: 30 tablet, Rfl: 1    Objective   Vitals:    02/26/25 1114   BP: 100/62         02/26/25  1114   Weight: 65 kg (143 lb 6.4 oz)     Body mass index is 21.18 kg/m².      Physical Exam  Vitals reviewed.   Constitutional:       Appearance: Normal appearance.   HENT:      Head: Normocephalic and atraumatic.   Eyes:      Extraocular Movements: Extraocular movements intact.      Conjunctiva/sclera: Conjunctivae normal.   Cardiovascular:      Rate and Rhythm: Normal rate and regular rhythm.      Heart sounds: Normal heart sounds.   Pulmonary:      Effort: Pulmonary effort is normal.      Breath sounds: Normal breath sounds.   Abdominal:      General: Abdomen is flat. Bowel sounds are normal. There is no distension.      Palpations: Abdomen is soft.      Tenderness: There is abdominal tenderness (LLQ and epigastric).   Neurological:      Mental Status: She is alert.   Psychiatric:         Mood and Affect: Mood normal.         Behavior: Behavior normal.         WBC   Date Value Ref Range Status   08/26/2023 13.58 (H) 4.5 - 11.0 10*3/uL Final     RBC   Date Value Ref Range Status   08/26/2023 3.67 (L) 4.0 - 5.2 10*6/uL Final     Hemoglobin   Date  Value Ref Range Status   08/26/2023 11.3 (L) 12.0 - 16.0 g/dL Final     Hematocrit   Date Value Ref Range Status   08/26/2023 34.9 (L) 36.0 - 46.0 % Final     MCV   Date Value Ref Range Status   08/26/2023 95.1 80.0 - 100.0 fL Final     MCH   Date Value Ref Range Status   08/26/2023 30.8 26.0 - 34.0 pg Final     MCHC   Date Value Ref Range Status   08/26/2023 32.4 31.0 - 37.0 g/dL Final     RDW   Date Value Ref Range Status   08/26/2023 17.7 (H) 12.0 - 16.8 % Final     RDW-SD   Date Value Ref Range Status   07/31/2023 51.6 37.0 - 54.0 fl Final     MPV   Date Value Ref Range Status   08/26/2023 10.8 8.4 - 12.4 fL Final     Platelets   Date Value Ref Range Status   08/26/2023 406 140 - 440 10*3/uL Final     Neutrophil Rel %   Date Value Ref Range Status   08/19/2023 59.1 45 - 80 % Final     Lymphocyte Rel %   Date Value Ref Range Status   08/19/2023 31.2 15 - 50 % Final     Monocyte Rel %   Date Value Ref Range Status   08/19/2023 7.4 0 - 15 % Final     Eosinophil %   Date Value Ref Range Status   08/19/2023 1.7 0 - 7 % Final   07/21/2023 0.8 % Final     Basophil Rel %   Date Value Ref Range Status   08/19/2023 0.4 0 - 2 % Final     Immature Grans %   Date Value Ref Range Status   08/19/2023 0.2 0.0 - 1.0 % Final     Neutrophils Absolute   Date Value Ref Range Status   08/19/2023 2.72 2.0 - 8.8 10*3/uL Final     Lymphocytes Absolute   Date Value Ref Range Status   08/19/2023 1.44 0.7 - 5.5 10*3/uL Final     Monocytes Absolute   Date Value Ref Range Status   08/19/2023 0.34 0.0 - 1.7 10*3/uL Final     Eosinophils Absolute   Date Value Ref Range Status   08/19/2023 0.08 0.0 - 0.8 10*3/uL Final     Basophils Absolute   Date Value Ref Range Status   08/19/2023 0.02 0.0 - 0.2 10*3/uL Final     Immature Grans, Absolute   Date Value Ref Range Status   08/19/2023 0.01 0.00 - 0.10 10*3/uL Final     nRBC   Date Value Ref Range Status   08/19/2023 0 0 /100(WBC) Final   07/31/2023 0.0 0.0 - 0.2 /100 WBC Final       Lab Results    Component Value Date    GLUCOSE 85 12/12/2024    BUN 6 12/12/2024    CREATININE 0.92 12/12/2024    EGFRIFNONA 88 11/11/2021    EGFRIFAFRI >60 04/26/2022    BCR 6.5 (L) 12/12/2024    CO2 26.3 12/12/2024    CALCIUM 9.8 12/12/2024    ALBUMIN 4.1 12/12/2024    LABIL2 1.1 04/26/2022    AST 20 12/12/2024    ALT 16 12/12/2024         Imaging Results (Last 7 Days)       ** No results found for the last 168 hours. **              Assessment & Plan   Diagnoses and all orders for this visit:    1. Irritable bowel syndrome with both constipation and diarrhea (Primary)  Assessment & Plan:  - Obtain KUB to evaluate for colonic stool burden/encopresis as the cause  - If KUB shows colonic stool burden, discussed starting Ibsrela 50 mg BID for IBS-C   - If KUB shows no colonic stool burden, discussed starting Rifaximin 550 mg TID for IBS-D     Orders:  -     XR Abdomen KUB; Future    2. Dyspepsia  Assessment & Plan:  - Inadequately controlled   - Transition from Omeprazole 40 mg BID to Voquezna 20 mg QD. Samples provide during encounter        Orders:  -     Vonoprazan Fumarate (Voquezna) 20 MG tablet; Take 1 tablet by mouth Daily.  Dispense: 14 tablet; Refill: 0  -     Vonoprazan Fumarate (Voquezna) 20 MG tablet; Take 1 tablet by mouth Daily.  Dispense: 30 tablet; Refill: 1    3. Nausea and vomiting, unspecified vomiting type  Assessment & Plan:  - Inadequately controlled   - Transition to PRN Compazine 10 mg q6h   - Obtain GET to further evaluate     Orders:  -     prochlorperazine (COMPAZINE) 10 MG tablet; Take 1 tablet by mouth Every 6 (Six) Hours As Needed for Nausea or Vomiting.  Dispense: 120 tablet; Refill: 11  -     NM Gastric Emptying; Future      RTC in 1 month     I have discussed the above plan with the patient.  They verbalize understanding and are in agreement with the plan.  They have been advised to contact the office for any questions, concerns, or changes related to their health.

## 2025-02-26 NOTE — ASSESSMENT & PLAN NOTE
- Inadequately controlled   - Transition from Omeprazole 40 mg BID to Voquezna 20 mg QD. Samples provide during encounter

## 2025-02-26 NOTE — TELEPHONE ENCOUNTER
Auth approved for voquezna    Your request has been approved  Effective Date: 1/1/2025  Authorization Expiration Date: 12/31/2025

## 2025-02-27 ENCOUNTER — TELEPHONE (OUTPATIENT)
Dept: GASTROENTEROLOGY | Facility: CLINIC | Age: 45
End: 2025-02-27
Payer: MEDICARE

## 2025-02-27 NOTE — PROGRESS NOTES
- KUB obtained for persistent diarrhea which showed mild colonic stool burden.   - POC: hold Linzess as only mild colonic stool burden was seen. Will transition to a 2 week course of Rifaximin 550 mg TID for suspected IBS-D

## 2025-02-27 NOTE — TELEPHONE ENCOUNTER
Hub staff attempted to follow warm transfer process and was unsuccessful     Caller: Aruna Hammonds    Relationship to patient: Self    Best call back number: [phone]      Patient is needing: PT WOULD LIKE CHAPIN TO CALL HER BACK.

## 2025-03-18 ENCOUNTER — HOSPITAL ENCOUNTER (OUTPATIENT)
Dept: NUCLEAR MEDICINE | Facility: HOSPITAL | Age: 45
Discharge: HOME | End: 2025-03-18
Payer: MEDICARE

## 2025-03-18 ENCOUNTER — TELEPHONE (OUTPATIENT)
Dept: GASTROENTEROLOGY | Facility: CLINIC | Age: 45
End: 2025-03-18
Payer: MEDICARE

## 2025-03-18 DIAGNOSIS — R11.2 NAUSEA AND VOMITING, UNSPECIFIED VOMITING TYPE: ICD-10-CM

## 2025-03-18 PROCEDURE — A9541 TC99M SULFUR COLLOID: HCPCS | Performed by: STUDENT IN AN ORGANIZED HEALTH CARE EDUCATION/TRAINING PROGRAM

## 2025-03-18 PROCEDURE — 34310000005 TECHNETIUM SULFUR COLLOID: Performed by: STUDENT IN AN ORGANIZED HEALTH CARE EDUCATION/TRAINING PROGRAM

## 2025-03-18 PROCEDURE — 78264 GASTRIC EMPTYING IMG STUDY: CPT

## 2025-03-18 RX ADMIN — TECHNETIUM TC 99M SULFUR COLLOID 1 DOSE: KIT at 11:04

## 2025-05-01 ENCOUNTER — HOSPITAL ENCOUNTER (EMERGENCY)
Facility: HOSPITAL | Age: 45
Discharge: LEFT BEFORE DISCHARGE | End: 2025-05-01
Payer: MEDICARE

## 2025-05-01 ENCOUNTER — OFFICE VISIT (OUTPATIENT)
Dept: GASTROENTEROLOGY | Facility: CLINIC | Age: 45
End: 2025-05-01
Payer: MEDICARE

## 2025-05-01 VITALS
BODY MASS INDEX: 19.91 KG/M2 | HEIGHT: 69 IN | DIASTOLIC BLOOD PRESSURE: 48 MMHG | WEIGHT: 134.4 LBS | SYSTOLIC BLOOD PRESSURE: 84 MMHG

## 2025-05-01 DIAGNOSIS — R19.5 DARK STOOLS: ICD-10-CM

## 2025-05-01 DIAGNOSIS — R11.2 NAUSEA AND VOMITING, UNSPECIFIED VOMITING TYPE: Primary | ICD-10-CM

## 2025-05-01 DIAGNOSIS — R42 DIZZINESS: ICD-10-CM

## 2025-05-01 DIAGNOSIS — K58.0 IRRITABLE BOWEL SYNDROME WITH DIARRHEA: ICD-10-CM

## 2025-05-01 PROCEDURE — 99211 OFF/OP EST MAY X REQ PHY/QHP: CPT

## 2025-05-01 RX ORDER — PROMETHAZINE HYDROCHLORIDE 25 MG/1
25 TABLET ORAL EVERY 8 HOURS PRN
COMMUNITY
Start: 2025-04-17

## 2025-05-01 RX ORDER — DICYCLOMINE HYDROCHLORIDE 10 MG/1
10 CAPSULE ORAL 3 TIMES DAILY PRN
Qty: 90 CAPSULE | Refills: 5 | Status: SHIPPED | OUTPATIENT
Start: 2025-05-01

## 2025-05-01 NOTE — PROGRESS NOTES
Patient or patient representative verbalized consent for the use of Ambient Listening during the visit with  RAYA Martinez for chart documentation. 5/1/2025  11:45 EDT    Chief Complaint   Patient presents with    Constipation    Gas    Diarrhea    Vomiting    Nausea    Rectal Bleeding         Patient is a 44 y.o. who presents to the office as a new patient to me.  Last in office visit completed on 2/26/2025 with Dr. Levine.  Patient has a significant past medical history of GERD, psoriasis-on Humira, and bipolar.    - C/s in 11/2023 had normal terminal ileum (biopsied -- negative for ileitis), external hemorrhoids     - EGD in 8/2023 had mild gastritis s/p bx negative for H pylori.     Past Surgical History   4/2022 Cholecystectomy  Appendectomy  Hysterectomy  Diagnostic laparoscopy x 2 5/2023 and 1/2023    Social History  Reports every day tobacco use  Denies illicit drug use or alcohol use    Family History  Colon cancer in father  Stomach cancer maternal aunt    History of Present Illness  The patient presents for evaluation of gastrointestinal symptoms.    Bowel movements and left sided abdominal pain:   - The patient reports experiencing melena for the past two weeks, with stools described as dark and malodorous, resembling spaghetti or coffee beans, occurring up to five times daily, particularly postprandially.  - Despite a two-week course of Xifaxan, there has been no improvement.  - Occasionally, the stool appears yellow-white and mucous-laden, but predominantly remains dark.  - Persistent left-sided abdominal pain is noted, which has been present since prior to an abdominal x-ray performed in February 2025.  - Hematochezia has been observed on one or two occasions.  - The patient is not currently taking any anti-inflammatory medications.  - The stool is described as runny and dark, akin possibly black however unsure.  - States the last dose of Pepto-Bismol was possibly 2 weeks ago prior to the onset  of dark and colored stools.  - Denies known fever or chills  - Denies nocturnal bowel movements.    Nausea and Vomiting  - The patient denies dysphagia but reports nausea and vomiting occurring postprandially several times per week.  - The vomitus is sometimes composed of undigested food, and at other times is bright yellow or green.  - Symptoms have not improved with Voquezna 20 mg and sucralfate 1 g TID   - The patient reports anorexia and upper abdominal pain associated with drinking.  - She has also noted a reduction in appetite with 9 pound weight loss noted at time of today's visit since February 2025.      Hypotension  - The patient reports hypotension, as noted by their primary care provider a few weeks ago, accompanied by dizziness and unsteadiness.  - States that it was low at last check with her primary care provider however dizziness has become worse since this time.  Denies change in treatment plan at that time.  - Denies chest pain or shortness of breath.    Supplemental information:     Urination occurs three to four times daily, with urine described as bright yellow. The patient consumes large quantities of Coca[employer] and does not drink water or any other fluids..         Current/Previous treatment for GERD  Current:  Voquezna 20 mg daily-initiated 2/26/2025  Previous:  Omeprazole 40 mg twice daily-initiated 12/2024-ineffective  Elavil-minimal relief and subsequently stopped  Zofran-ineffective  Compazine-ineffective    Current/Previous treatment for IBS-mixed  Current:  Started   Intermittent use of pepto-bismol none recently   2.  Previous:  -Linzess 145 mcg daily-instructed to hold Linzess after 2/26/2025 KUB  Xifaxan 2/28/2025- no significant improvement     Medications    Current Outpatient Medications:     ALPRAZolam (XANAX) 1 MG tablet, Take 1 tablet by mouth Every 8 (Eight) Hours As Needed., Disp: , Rfl:     atorvastatin (LIPITOR) 40 MG tablet, Take 1 tablet by mouth Daily., Disp: , Rfl:      FLUoxetine (PROzac) 40 MG capsule, 1 capsule Daily., Disp: , Rfl:     gabapentin (NEURONTIN) 800 MG tablet, Take 1 tablet by mouth 3 times a day., Disp: , Rfl:     Humira 40 MG/0.8ML Prefilled Syringe Kit injection, Inject 0.8 mL under the skin into the appropriate area as directed Every 14 (Fourteen) Days., Disp: , Rfl:     levothyroxine (SYNTHROID, LEVOTHROID) 50 MCG tablet, Daily., Disp: , Rfl:     ondansetron ODT (ZOFRAN-ODT) 8 MG disintegrating tablet, Place 1 tablet on the tongue Every 8 (Eight) Hours As Needed for Nausea or Vomiting., Disp: 270 tablet, Rfl: 3    potassium chloride (MICRO-K) 10 MEQ CR capsule, Every 12 (Twelve) Hours., Disp: , Rfl:     promethazine (PHENERGAN) 25 MG tablet, Take 1 tablet by mouth Every 8 (Eight) Hours As Needed., Disp: , Rfl:     sucralfate (CARAFATE) 1 g tablet, , Disp: , Rfl:     SUMAtriptan (IMITREX) 50 MG tablet, Take 1 tablet by mouth 1 (One) Time As Needed., Disp: , Rfl:     topiramate (Topamax) 100 MG tablet, Every 12 (Twelve) Hours., Disp: , Rfl:     traMADol (ULTRAM) 50 MG tablet, Take 1 tablet by mouth Every 6 (Six) Hours As Needed., Disp: , Rfl:     Vonoprazan Fumarate (Voquezna) 20 MG tablet, Take 1 tablet by mouth Daily., Disp: 30 tablet, Rfl: 1    dicyclomine (BENTYL) 10 MG capsule, Take 1 capsule by mouth 3 (Three) Times a Day As Needed (abdominal pain/diarrhea)., Disp: 90 capsule, Rfl: 5  Result Review :      Common labs          12/12/2024    10:28   Common Labs   Glucose 85    BUN 6    Creatinine 0.92    Sodium 141    Potassium 4.1    Chloride 107    Calcium 9.8    Albumin 4.1    Total Bilirubin 0.4    Alkaline Phosphatase 161    AST (SGOT) 20    ALT (SGPT) 16      Office Visit with Jeyson Levine MD (02/26/2025)   NM Gastric Emptying (03/18/2025 14:12)  XR Abdomen KUB (02/26/2025 11:56)- mild stool burden   NM Gastric Emptying (03/18/2025 14:12) - Normal   Vital Signs:   BP (!) 84/48 (BP Location: Left arm, Patient Position: Sitting, Cuff Size: Adult)  "Comment: was taken twice  Ht 175.3 cm (69.02\")   Wt 61 kg (134 lb 6.4 oz)   BMI 19.84 kg/m²     Body mass index is 19.84 kg/m².      Physical Exam  Constitutional:       General: She is not in acute distress.     Appearance: Normal appearance. She is not ill-appearing.   HENT:      Head: Normocephalic.   Eyes:      General: No scleral icterus.  Pulmonary:      Effort: No respiratory distress.   Abdominal:      General: Abdomen is flat. Bowel sounds are normal. There is no distension.      Palpations: Abdomen is soft. There is no mass.      Tenderness: There is no abdominal tenderness. There is no guarding or rebound.      Hernia: No hernia is present.   Skin:     General: Skin is warm and dry.   Neurological:      Mental Status: She is alert.      Gait: Gait normal.   Psychiatric:         Mood and Affect: Mood normal.       Assessment and Plan    Diagnoses and all orders for this visit:    1. Nausea and vomiting, unspecified vomiting type (Primary)  -     CBC & Differential  -     Ferritin  -     Iron Profile  -     Folate  -     Vitamin B12  -     Comprehensive Metabolic Panel    2. Irritable bowel syndrome with diarrhea  -     dicyclomine (BENTYL) 10 MG capsule; Take 1 capsule by mouth 3 (Three) Times a Day As Needed (abdominal pain/diarrhea).  Dispense: 90 capsule; Refill: 5    3. Dark stools    4. Dizziness       Assessment & Plan  Abdominal pain, Nausea, Vomting, and dizziness with hypotension  - We reviewed vital signs at time of arrival to the office and reported darkened colored stools.  Discussed plans to proceed with blood work for CBC, CMP, vitamin B12, iron profile, ferritin, and folate to further assess changes.  With instructions to Seek immediate medical attention if experiencing dizziness, low blood pressure, or difficulty keeping fluids down  OR if lab work shows signs of anemia.  - However given unintentional weight loss, worsening hypotension, dizziness and ongoing anorexia patient verbalizes " desire to seek further evaluation in the ER at this time.  Report called to triage nursing staff at [facility] ER and patient transported via wheelchair for further evaluation of acute concerns.  All questions answered and support provided.  Will plan to have patient follow-up in our office after ER evaluation with  Further recommendations to be made pending results of the above work-up and clinical course.    - Discussed plans to have her initiate fiber supplementation after completion of ER assessment as well as importance of maintaining adequate hydration and oral intake to ensure nutritional needs are met.  All questions answered and support provided.      Patient is agreeable to the outlined above treatment plan.  Verbalizes understanding and will contact office for any new or worsening concerns.  All questions answered and support provided.  Patient Instructions   Go to ER for further evaluation of symptoms       EMR Dragon/Transcription Disclaimer:  This document has been Dictated utilizing Dragon dictation.     Edel Ponce, MSN, APRN, FNP-C   [facility]  Gastroenterology   [address] [facility]. [address] [place] [address]  [phone] office  [phone] fax

## 2025-05-05 ENCOUNTER — TELEPHONE (OUTPATIENT)
Dept: GASTROENTEROLOGY | Facility: CLINIC | Age: 45
End: 2025-05-05
Payer: MEDICARE

## 2025-05-05 NOTE — TELEPHONE ENCOUNTER
RAYA was contacted by ER physician Dr. Prado notifying provider of patient leaving without being seen after it was recommended that she seek further evaluation in the ER for symptomatic hypotension with dark stools.    Will have nursing staff contact patient to obtain update and also recommend that ordered labs to be performed to further assess current symptoms.    RAYA Martinez

## 2025-05-08 ENCOUNTER — TELEPHONE (OUTPATIENT)
Dept: GASTROENTEROLOGY | Facility: CLINIC | Age: 45
End: 2025-05-08
Payer: MEDICARE

## 2025-05-08 NOTE — TELEPHONE ENCOUNTER
dicyclomine (BENTYL) 10 MG capsule [3137] (Order [id number])     DOES SHE NEED TO TAKE THIS 10MG 3 X'S A DAY  OR   SHE WAS ON 20 MG 2X'S A DAY  OR  DOES SHE TAKE BOTH    SHE WOULD LIKE A CALL

## 2025-05-08 NOTE — TELEPHONE ENCOUNTER
Advised patient that her current treatment plan is bentyl 10 mg three times a day as needed.   Advised to discontinue 20 mg.   Patient gave verbal understanding.

## 2025-06-20 ENCOUNTER — TELEPHONE (OUTPATIENT)
Dept: GASTROENTEROLOGY | Facility: CLINIC | Age: 45
End: 2025-06-20
Payer: MEDICARE

## 2025-06-23 NOTE — TELEPHONE ENCOUNTER
PT MADE A FOLLOW UP APT  FIRST AVAILABLE NOT UNTIL 07/23  SHE DOESN'T HAVE ANY MORE MEDS  SHE WOULD LIKE TO REQUEST SOME BE CALLED IN TO HOLD HER OVER  SHE ALSO WOULD LIKE TO HAVE A TELEPHONE CALL BACK TO LET HER KNOW INSTEAD OF A MYCHART   STATES SHE ISN'T ABLE TO ACCESS THE [place] [employer]

## 2025-06-24 ENCOUNTER — HOSPITAL ENCOUNTER (OUTPATIENT)
Dept: GENERAL RADIOLOGY | Facility: HOSPITAL | Age: 45
Discharge: HOME | End: 2025-06-24
Payer: MEDICARE

## 2025-06-24 ENCOUNTER — OFFICE VISIT (OUTPATIENT)
Dept: GASTROENTEROLOGY | Facility: CLINIC | Age: 45
End: 2025-06-24
Payer: MEDICARE

## 2025-06-24 ENCOUNTER — LAB (OUTPATIENT)
Dept: LAB | Facility: HOSPITAL | Age: 45
End: 2025-06-24
Payer: MEDICARE

## 2025-06-24 VITALS
DIASTOLIC BLOOD PRESSURE: 64 MMHG | WEIGHT: 132.6 LBS | SYSTOLIC BLOOD PRESSURE: 100 MMHG | HEIGHT: 69 IN | BODY MASS INDEX: 19.64 KG/M2

## 2025-06-24 DIAGNOSIS — K58.2 IRRITABLE BOWEL SYNDROME WITH BOTH CONSTIPATION AND DIARRHEA: Primary | ICD-10-CM

## 2025-06-24 DIAGNOSIS — R19.5 DARK STOOLS: ICD-10-CM

## 2025-06-24 DIAGNOSIS — R10.13 DYSPEPSIA: ICD-10-CM

## 2025-06-24 DIAGNOSIS — K58.2 IRRITABLE BOWEL SYNDROME WITH BOTH CONSTIPATION AND DIARRHEA: ICD-10-CM

## 2025-06-24 DIAGNOSIS — R11.2 NAUSEA AND VOMITING, UNSPECIFIED VOMITING TYPE: ICD-10-CM

## 2025-06-24 DIAGNOSIS — K58.1 IRRITABLE BOWEL SYNDROME WITH CONSTIPATION: Primary | ICD-10-CM

## 2025-06-24 LAB
ALBUMIN SERPL-MCNC: 4.2 G/DL (ref 3.5–5.2)
ALBUMIN/GLOB SERPL: 1.1 G/DL
ALP SERPL-CCNC: 145 U/L (ref 39–117)
ALT SERPL W P-5'-P-CCNC: 11 U/L (ref 1–33)
ANION GAP SERPL CALCULATED.3IONS-SCNC: 12 MMOL/L (ref 5–15)
AST SERPL-CCNC: 21 U/L (ref 1–32)
BASOPHILS # BLD AUTO: 0.04 10*3/MM3 (ref 0–0.2)
BASOPHILS NFR BLD AUTO: 0.8 % (ref 0–1.5)
BILIRUB SERPL-MCNC: 0.3 MG/DL (ref 0–1.2)
BUN SERPL-MCNC: 3 MG/DL (ref 6–20)
BUN/CREAT SERPL: 3.4 (ref 7–25)
CALCIUM SPEC-SCNC: 9 MG/DL (ref 8.6–10.5)
CHLORIDE SERPL-SCNC: 105 MMOL/L (ref 98–107)
CO2 SERPL-SCNC: 23 MMOL/L (ref 22–29)
CREAT SERPL-MCNC: 0.89 MG/DL (ref 0.57–1)
DEPRECATED RDW RBC AUTO: 43.5 FL (ref 37–54)
EGFRCR SERPLBLD CKD-EPI 2021: 82.1 ML/MIN/1.73
EOSINOPHIL # BLD AUTO: 0.13 10*3/MM3 (ref 0–0.4)
EOSINOPHIL NFR BLD AUTO: 2.6 % (ref 0.3–6.2)
ERYTHROCYTE [DISTWIDTH] IN BLOOD BY AUTOMATED COUNT: 13.1 % (ref 12.3–15.4)
FERRITIN SERPL-MCNC: 47.3 NG/ML (ref 13–150)
FOLATE SERPL-MCNC: 2.06 NG/ML (ref 4.78–24.2)
GLOBULIN UR ELPH-MCNC: 3.7 GM/DL
GLUCOSE SERPL-MCNC: 76 MG/DL (ref 65–99)
HCT VFR BLD AUTO: 41.2 % (ref 34–46.6)
HGB BLD-MCNC: 13.2 G/DL (ref 12–15.9)
IMM GRANULOCYTES # BLD AUTO: 0.01 10*3/MM3 (ref 0–0.05)
IMM GRANULOCYTES NFR BLD AUTO: 0.2 % (ref 0–0.5)
IRON 24H UR-MRATE: 47 MCG/DL (ref 37–145)
IRON SATN MFR SERPL: 13 % (ref 20–50)
LYMPHOCYTES # BLD AUTO: 1.85 10*3/MM3 (ref 0.7–3.1)
LYMPHOCYTES NFR BLD AUTO: 37.7 % (ref 19.6–45.3)
MCH RBC QN AUTO: 29.1 PG (ref 26.6–33)
MCHC RBC AUTO-ENTMCNC: 32 G/DL (ref 31.5–35.7)
MCV RBC AUTO: 90.9 FL (ref 79–97)
MONOCYTES # BLD AUTO: 0.36 10*3/MM3 (ref 0.1–0.9)
MONOCYTES NFR BLD AUTO: 7.3 % (ref 5–12)
NEUTROPHILS NFR BLD AUTO: 2.52 10*3/MM3 (ref 1.7–7)
NEUTROPHILS NFR BLD AUTO: 51.4 % (ref 42.7–76)
NRBC BLD AUTO-RTO: 0 /100 WBC (ref 0–0.2)
PLATELET # BLD AUTO: 252 10*3/MM3 (ref 140–450)
PMV BLD AUTO: 10.6 FL (ref 6–12)
POTASSIUM SERPL-SCNC: 3.7 MMOL/L (ref 3.5–5.2)
PROT SERPL-MCNC: 7.9 G/DL (ref 6–8.5)
RBC # BLD AUTO: 4.53 10*6/MM3 (ref 3.77–5.28)
SODIUM SERPL-SCNC: 140 MMOL/L (ref 136–145)
TIBC SERPL-MCNC: 352 MCG/DL (ref 298–536)
TRANSFERRIN SERPL-MCNC: 236 MG/DL (ref 200–360)
VIT B12 BLD-MCNC: 420 PG/ML (ref 211–946)
WBC NRBC COR # BLD AUTO: 4.91 10*3/MM3 (ref 3.4–10.8)

## 2025-06-24 PROCEDURE — 83540 ASSAY OF IRON: CPT

## 2025-06-24 PROCEDURE — 85025 COMPLETE CBC W/AUTO DIFF WBC: CPT

## 2025-06-24 PROCEDURE — 74018 RADEX ABDOMEN 1 VIEW: CPT

## 2025-06-24 PROCEDURE — 82607 VITAMIN B-12: CPT

## 2025-06-24 PROCEDURE — 84466 ASSAY OF TRANSFERRIN: CPT

## 2025-06-24 PROCEDURE — 82728 ASSAY OF FERRITIN: CPT

## 2025-06-24 PROCEDURE — 1160F RVW MEDS BY RX/DR IN RCRD: CPT | Performed by: STUDENT IN AN ORGANIZED HEALTH CARE EDUCATION/TRAINING PROGRAM

## 2025-06-24 PROCEDURE — 80053 COMPREHEN METABOLIC PANEL: CPT

## 2025-06-24 PROCEDURE — 1159F MED LIST DOCD IN RCRD: CPT | Performed by: STUDENT IN AN ORGANIZED HEALTH CARE EDUCATION/TRAINING PROGRAM

## 2025-06-24 PROCEDURE — 82746 ASSAY OF FOLIC ACID SERUM: CPT

## 2025-06-24 PROCEDURE — 99214 OFFICE O/P EST MOD 30 MIN: CPT | Performed by: STUDENT IN AN ORGANIZED HEALTH CARE EDUCATION/TRAINING PROGRAM

## 2025-06-24 RX ORDER — VONOPRAZAN FUMARATE 26.72 MG/1
20 TABLET ORAL DAILY
Qty: 14 TABLET | Refills: 0 | COMMUNITY
Start: 2025-06-24

## 2025-06-24 RX ORDER — LUBIPROSTONE 8 UG/1
8 CAPSULE ORAL 2 TIMES DAILY WITH MEALS
Qty: 60 CAPSULE | Refills: 11 | Status: SHIPPED | OUTPATIENT
Start: 2025-06-24

## 2025-06-24 RX ORDER — VONOPRAZAN FUMARATE 13.36 MG/1
10 TABLET ORAL DAILY
Qty: 30 TABLET | Refills: 11 | Status: SHIPPED | OUTPATIENT
Start: 2025-06-24

## 2025-06-24 NOTE — ASSESSMENT & PLAN NOTE
- Inadequately controlled   - Continue Voquezna 20 mg QD. Renew prescription and samples provided during encounter   - Suspect her persistent symptoms are likely due to constipation. KUB ordered to further evaluate

## 2025-06-24 NOTE — PROGRESS NOTES
Aruna Hammonds is a 44 y.o. female with PMH of H Pylori Gastritis, IBS, GERD, s/p CCY, Psoriasis on Humira, Bipolar DO + noted below who presents with   Chief Complaint   Patient presents with    Irritable bowel syndrome with both constipation and diarrhea    Abdominal Pain    Nausea    Fecal Urgency        Subjective     Accompanied by her partner.     # IBS-M  - Previously treated with 2 week course of Rifaximin in 2/2025.   - Prior to 2/2025 was being treated with Linzess 145 mcg QD.   - During encounter, reports her symptoms have worsened since being off Linzess in 2/2025.   - Endorses persistent abdominal bloating, straining with bowel movements, and nausea. Has typically 2 bowel movements per week.   - She felt like Linzess wasn't completely effective at controlling her constipation in the past.   - KUB in 2/2025 had mild colonic stool burden.     # Dyspepsia   # Nausea and Vomiting  - Transitioned to Voquezna 20 mg QD following her appointment in 2/2025 which did provide some relief but ran out of the prescription in the interim.    - Transitioned to PRN Phenergan in 2/2025 which provided some relief.  - Continues to endorse intermittent abdominal bloating and nausea.     - There was concern for melena following her appointment in 5/2025. Was instructed to be seen in the ER but left prior to being seen. Still endorses intermittent dark colored stools.   . Had minimal relief Omeprazole 40 mg BID, Elavil, and PRN Zofran.   - Had normal GET in 3/2025. ARM were never done in the interim.  - C/s in 11/2023 had normal terminal ileum (biopsied -- negative for ileitis), external hemorrhoids   - EGD in 8/2023 had mild gastritis s/p bx negative for H pylori.             Past Medical History:   Diagnosis Date    Abdominal pain     pt states normal gastric emptying study, lost 40 lbs since 1/2023, being referred to GI    ADHD (attention deficit hyperactivity disorder)     Bipolar 1 disorder     Colon polyp      Depression     Endometriosis     GERD (gastroesophageal reflux disease)     Hyperlipidemia     Hypertension     Hypothyroid     Migraines     with aura    Nausea and vomiting 2022    Ovarian cyst     Psychosis     PTSD (post-traumatic stress disorder)        Social History     Socioeconomic History    Marital status:    Tobacco Use    Smoking status: Every Day     Current packs/day: 0.00     Average packs/day: 1 pack/day for 5.0 years (5.0 ttl pk-yrs)     Types: Cigarettes     Start date: 2017     Last attempt to quit: 2024     Years since quittin.8    Smokeless tobacco: Never    Tobacco comments:     caff use   Vaping Use    Vaping status: Never Used   Substance and Sexual Activity    Alcohol use: No    Drug use: No    Sexual activity: Yes     Partners: Female     Birth control/protection: None, Hysterectomy, Partner of same sex         Current Outpatient Medications:     ALPRAZolam (XANAX) 1 MG tablet, Take 1 tablet by mouth Every 8 (Eight) Hours As Needed., Disp: , Rfl:     atorvastatin (LIPITOR) 40 MG tablet, Take 1 tablet by mouth Daily., Disp: , Rfl:     dicyclomine (BENTYL) 10 MG capsule, Take 1 capsule by mouth 3 (Three) Times a Day As Needed (abdominal pain/diarrhea)., Disp: 90 capsule, Rfl: 5    FLUoxetine (PROzac) 40 MG capsule, 1 capsule Daily., Disp: , Rfl:     gabapentin (NEURONTIN) 800 MG tablet, Take 1 tablet by mouth 3 times a day., Disp: , Rfl:     Humira 40 MG/0.8ML Prefilled Syringe Kit injection, Inject 0.8 mL under the skin into the appropriate area as directed Every 14 (Fourteen) Days., Disp: , Rfl:     levothyroxine (SYNTHROID, LEVOTHROID) 50 MCG tablet, Daily., Disp: , Rfl:     ondansetron ODT (ZOFRAN-ODT) 8 MG disintegrating tablet, Place 1 tablet on the tongue Every 8 (Eight) Hours As Needed for Nausea or Vomiting., Disp: 270 tablet, Rfl: 3    potassium chloride (MICRO-K) 10 MEQ CR capsule, Every 12 (Twelve) Hours., Disp: , Rfl:     promethazine (PHENERGAN) 25 MG  tablet, Take 1 tablet by mouth Every 8 (Eight) Hours As Needed., Disp: , Rfl:     sucralfate (CARAFATE) 1 g tablet, , Disp: , Rfl:     SUMAtriptan (IMITREX) 50 MG tablet, Take 1 tablet by mouth 1 (One) Time As Needed., Disp: , Rfl:     topiramate (Topamax) 100 MG tablet, Every 12 (Twelve) Hours., Disp: , Rfl:     traMADol (ULTRAM) 50 MG tablet, Take 1 tablet by mouth Every 6 (Six) Hours As Needed., Disp: , Rfl:     folic acid (FOLVITE) 1 MG tablet, Take 2 tablets by mouth Daily., Disp: 180 tablet, Rfl: 3    lubiprostone (Amitiza) 8 MCG capsule, Take 1 capsule by mouth 2 (Two) Times a Day With Meals., Disp: 60 capsule, Rfl: 11    Vonoprazan Fumarate (Voquezna) 10 MG tablet, Take 1 tablet by mouth Daily., Disp: 30 tablet, Rfl: 11    Vonoprazan Fumarate (Voquezna) 20 MG tablet, Take 1 tablet by mouth Daily., Disp: 14 tablet, Rfl: 0    Objective   Vitals:    06/24/25 1306   BP: 100/64         06/24/25  1306   Weight: 60.1 kg (132 lb 9.6 oz)     Body mass index is 19.57 kg/m².      Physical Exam  Vitals reviewed.   Constitutional:       Appearance: Normal appearance.   Eyes:      Extraocular Movements: Extraocular movements intact.      Conjunctiva/sclera: Conjunctivae normal.   Cardiovascular:      Rate and Rhythm: Normal rate and regular rhythm.      Heart sounds: Normal heart sounds.   Pulmonary:      Effort: Pulmonary effort is normal.      Breath sounds: Normal breath sounds.   Abdominal:      General: Abdomen is flat. Bowel sounds are normal. There is no distension.      Palpations: Abdomen is soft.      Tenderness: There is abdominal tenderness (mildly).   Neurological:      Mental Status: She is alert.   Psychiatric:         Mood and Affect: Mood normal.         Behavior: Behavior normal.         WBC   Date Value Ref Range Status   06/24/2025 4.91 3.40 - 10.80 10*3/mm3 Final   08/26/2023 13.58 (H) 4.5 - 11.0 10*3/uL Final     RBC   Date Value Ref Range Status   06/24/2025 4.53 3.77 - 5.28 10*6/mm3 Final    08/26/2023 3.67 (L) 4.0 - 5.2 10*6/uL Final     Hemoglobin   Date Value Ref Range Status   06/24/2025 13.2 12.0 - 15.9 g/dL Final   08/26/2023 11.3 (L) 12.0 - 16.0 g/dL Final     Hematocrit   Date Value Ref Range Status   06/24/2025 41.2 34.0 - 46.6 % Final   08/26/2023 34.9 (L) 36.0 - 46.0 % Final     MCV   Date Value Ref Range Status   06/24/2025 90.9 79.0 - 97.0 fL Final   08/26/2023 95.1 80.0 - 100.0 fL Final     MCH   Date Value Ref Range Status   06/24/2025 29.1 26.6 - 33.0 pg Final   08/26/2023 30.8 26.0 - 34.0 pg Final     MCHC   Date Value Ref Range Status   06/24/2025 32.0 31.5 - 35.7 g/dL Final   08/26/2023 32.4 31.0 - 37.0 g/dL Final     RDW   Date Value Ref Range Status   06/24/2025 13.1 12.3 - 15.4 % Final   08/26/2023 17.7 (H) 12.0 - 16.8 % Final     RDW-SD   Date Value Ref Range Status   06/24/2025 43.5 37.0 - 54.0 fl Final     MPV   Date Value Ref Range Status   06/24/2025 10.6 6.0 - 12.0 fL Final   08/26/2023 10.8 8.4 - 12.4 fL Final     Platelets   Date Value Ref Range Status   06/24/2025 252 140 - 450 10*3/mm3 Final   08/26/2023 406 140 - 440 10*3/uL Final     Neutrophil Rel %   Date Value Ref Range Status   08/19/2023 59.1 45 - 80 % Final     Neutrophil %   Date Value Ref Range Status   06/24/2025 51.4 42.7 - 76.0 % Final     Lymphocyte Rel %   Date Value Ref Range Status   08/19/2023 31.2 15 - 50 % Final     Lymphocyte %   Date Value Ref Range Status   06/24/2025 37.7 19.6 - 45.3 % Final     Monocyte Rel %   Date Value Ref Range Status   08/19/2023 7.4 0 - 15 % Final     Monocyte %   Date Value Ref Range Status   06/24/2025 7.3 5.0 - 12.0 % Final     Eosinophil %   Date Value Ref Range Status   06/24/2025 2.6 0.3 - 6.2 % Final   08/19/2023 1.7 0 - 7 % Final   07/21/2023 0.8 % Final     Basophil Rel %   Date Value Ref Range Status   08/19/2023 0.4 0 - 2 % Final     Basophil %   Date Value Ref Range Status   06/24/2025 0.8 0.0 - 1.5 % Final     Immature Grans %   Date Value Ref Range Status    06/24/2025 0.2 0.0 - 0.5 % Final   08/19/2023 0.2 0.0 - 1.0 % Final     Neutrophils Absolute   Date Value Ref Range Status   08/19/2023 2.72 2.0 - 8.8 10*3/uL Final     Neutrophils, Absolute   Date Value Ref Range Status   06/24/2025 2.52 1.70 - 7.00 10*3/mm3 Final     Lymphocytes Absolute   Date Value Ref Range Status   08/19/2023 1.44 0.7 - 5.5 10*3/uL Final     Lymphocytes, Absolute   Date Value Ref Range Status   06/24/2025 1.85 0.70 - 3.10 10*3/mm3 Final     Monocytes Absolute   Date Value Ref Range Status   08/19/2023 0.34 0.0 - 1.7 10*3/uL Final     Monocytes, Absolute   Date Value Ref Range Status   06/24/2025 0.36 0.10 - 0.90 10*3/mm3 Final     Eosinophils Absolute   Date Value Ref Range Status   08/19/2023 0.08 0.0 - 0.8 10*3/uL Final     Eosinophils, Absolute   Date Value Ref Range Status   06/24/2025 0.13 0.00 - 0.40 10*3/mm3 Final     Basophils Absolute   Date Value Ref Range Status   08/19/2023 0.02 0.0 - 0.2 10*3/uL Final     Basophils, Absolute   Date Value Ref Range Status   06/24/2025 0.04 0.00 - 0.20 10*3/mm3 Final     Immature Grans, Absolute   Date Value Ref Range Status   06/24/2025 0.01 0.00 - 0.05 10*3/mm3 Final   08/19/2023 0.01 0.00 - 0.10 10*3/uL Final     nRBC   Date Value Ref Range Status   06/24/2025 0.0 0.0 - 0.2 /100 WBC Final       Lab Results   Component Value Date    GLUCOSE 76 06/24/2025    BUN 3.0 (L) 06/24/2025    CREATININE 0.89 06/24/2025    EGFRIFNONA 88 11/11/2021    EGFRIFAFRI >60 04/26/2022    BCR 3.4 (L) 06/24/2025    CO2 23.0 06/24/2025    CALCIUM 9.0 06/24/2025    ALBUMIN 4.2 06/24/2025    LABIL2 1.1 04/26/2022    AST 21 06/24/2025    ALT 11 06/24/2025         Imaging Results (Last 7 Days)       ** No results found for the last 168 hours. **              Assessment & Plan   Diagnoses and all orders for this visit:    1. Irritable bowel syndrome with both constipation and diarrhea (Primary)  Assessment & Plan:  - Inadequately controlled   - Obtain KUB to further  evaluate given concern for worsening constipation   - Most recently treated with Rifaximin in 2/2025   - If KUB confirms colonic stool burden, will start Amitiza 8 mcg BID for IBS-C     Orders:  -     XR Abdomen KUB; Future    2. Dyspepsia  Assessment & Plan:  - Inadequately controlled   - Continue Voquezna 20 mg QD. Renew prescription and samples provided during encounter   - Suspect her persistent symptoms are likely due to constipation. KUB ordered to further evaluate     Orders:  -     Vonoprazan Fumarate (Voquezna) 10 MG tablet; Take 1 tablet by mouth Daily.  Dispense: 30 tablet; Refill: 11  -     Vonoprazan Fumarate (Voquezna) 20 MG tablet; Take 1 tablet by mouth Daily.  Dispense: 14 tablet; Refill: 0    3. Nausea and vomiting, unspecified vomiting type  Assessment & Plan:  - Mildly improved   - Continue PRN Compazine 10 mg q6h       4. Dark stools  Assessment & Plan:  - Obtain H&H to further evaluate     Orders:  -     Hemoglobin & Hematocrit, Blood; Future      RTC in 4 to 6 weeks     I have discussed the above plan with the patient.  They verbalize understanding and are in agreement with the plan.  They have been advised to contact the office for any questions, concerns, or changes related to their health.

## 2025-06-24 NOTE — ASSESSMENT & PLAN NOTE
- Inadequately controlled   - Obtain KUB to further evaluate given concern for worsening constipation   - Most recently treated with Rifaximin in 2/2025   - If KUB confirms colonic stool burden, will start Amitiza 8 mcg BID for IBS-C

## 2025-06-25 DIAGNOSIS — E53.8 FOLIC ACID DEFICIENCY: Primary | ICD-10-CM

## 2025-06-25 RX ORDER — FOLIC ACID 1 MG/1
2 TABLET ORAL DAILY
Qty: 180 TABLET | Refills: 3 | Status: SHIPPED | OUTPATIENT
Start: 2025-06-25

## 2025-07-28 PROBLEM — K58.1 IRRITABLE BOWEL SYNDROME WITH CONSTIPATION: Status: ACTIVE | Noted: 2025-02-26

## 2025-07-28 PROBLEM — K52.9 ILEITIS: Status: RESOLVED | Noted: 2023-10-16 | Resolved: 2025-07-28

## 2025-07-28 PROBLEM — R19.5 DARK STOOLS: Status: RESOLVED | Noted: 2025-05-01 | Resolved: 2025-07-28

## 2025-07-28 PROBLEM — K92.0 HEMATEMESIS WITH NAUSEA: Status: RESOLVED | Noted: 2023-08-01 | Resolved: 2025-07-28

## 2025-07-28 PROBLEM — K58.0 IRRITABLE BOWEL SYNDROME WITH DIARRHEA: Status: RESOLVED | Noted: 2025-05-01 | Resolved: 2025-07-28

## 2025-07-28 PROBLEM — K62.5 RECTAL BLEEDING: Status: RESOLVED | Noted: 2022-05-02 | Resolved: 2025-07-28

## 2025-08-11 ENCOUNTER — TELEPHONE (OUTPATIENT)
Dept: GASTROENTEROLOGY | Facility: CLINIC | Age: 45
End: 2025-08-11
Payer: MEDICARE

## (undated) DEVICE — DECANTER: Brand: UNBRANDED

## (undated) DEVICE — LAG GYN LAPAROSCOPY: Brand: MEDLINE INDUSTRIES, INC.

## (undated) DEVICE — SUT VIC 0/0 UR6 27IN DYED J603H

## (undated) DEVICE — ENDOPATH XCEL BLADELESS TROCARS WITH STABILITY SLEEVES: Brand: ENDOPATH XCEL

## (undated) DEVICE — THE BITE BLOCK MAXI, LATEX FREE STRAP IS USED TO PROTECT THE ENDOSCOPE INSERTION TUBE FROM BEING BITTEN BY THE PATIENT.

## (undated) DEVICE — UNDYED BRAIDED (POLYGLACTIN 910), SYNTHETIC ABSORBABLE SUTURE: Brand: COATED VICRYL

## (undated) DEVICE — LAB CORP AGAR SLANT UREA PK/10

## (undated) DEVICE — APPL HEMOS FOR DELIVERY FLOSEAL

## (undated) DEVICE — BW-412T DISP COMBO CLEANING BRUSH: Brand: SINGLE USE COMBINATION CLEANING BRUSH

## (undated) DEVICE — LAPAROSCOPIC SMOKE FILTRATION SYSTEM: Brand: PALL LAPAROSHIELD® PLUS LAPAROSCOPIC SMOKE FILTRATION SYSTEM

## (undated) DEVICE — TUBING, SUCTION, 1/4" X 12', STRAIGHT: Brand: MEDLINE

## (undated) DEVICE — SOL IRR H2O BTL 1000ML STRL

## (undated) DEVICE — CONTAINER,SPECIMEN,OR STERILE,4OZ: Brand: MEDLINE

## (undated) DEVICE — Device

## (undated) DEVICE — SAFELINER SUCTION CANISTER 1000CC: Brand: DEROYAL

## (undated) DEVICE — 2, DISPOSABLE SUCTION/IRRIGATOR WITH DISPOSABLE TIP: Brand: STRYKEFLOW

## (undated) DEVICE — GOWN,PREVENTION PLUS,XLNG/XXLARGE,STRL: Brand: MEDLINE

## (undated) DEVICE — TBG INSUFL W FLTR STRL

## (undated) DEVICE — LAPAROSCOPIC SCOPE WARMER: Brand: DEROYAL

## (undated) DEVICE — VIAL FORMALIN CAP 10P 40ML

## (undated) DEVICE — GLV SURG NEOPRN SENSICARE PF SZ/6.5 LF EA/1PR

## (undated) DEVICE — GLV SURG SENSICARE W/ALOE PF LF 6.5 STRL

## (undated) DEVICE — SYR LUERLOK 20CC BX/50

## (undated) DEVICE — SYR LUERLOK 30CC

## (undated) DEVICE — DECANT BG O JET

## (undated) DEVICE — NDL HYPO PRECISIONGLIDE REG 25G 1 1/2

## (undated) DEVICE — PK LAP GEN 90

## (undated) DEVICE — TOTAL TRAY, DB, 100% SILI FOLEY, 16FR 10: Brand: MEDLINE

## (undated) DEVICE — GLV SURG SENSICARE W/ALOE PF LF 7.5 STRL

## (undated) DEVICE — ENDOCUT SCISSOR TIP, DISPOSABLE: Brand: RENEW

## (undated) DEVICE — KT ORCA ORCAPOD DISP STRL

## (undated) DEVICE — ENDOPOUCH RETRIEVER SPECIMEN RETRIEVAL BAGS: Brand: ENDOPOUCH RETRIEVER

## (undated) DEVICE — SUT MNCRYL 4/0 SH 27IN Y415H

## (undated) DEVICE — SPNG GZ 2S 2X2 8PLY STRL PK/2

## (undated) DEVICE — LINER SURG CANSTR SXN S/RIGD 1500CC

## (undated) DEVICE — 1-PIECE PEDIATRIC UROSTOMY POUCH, SOFTFLEX: Brand: POUCHKINS

## (undated) DEVICE — SUT VIC 0 CT1 36IN J946H

## (undated) DEVICE — SUT MNCRYL 4/0 PS2 18 IN

## (undated) DEVICE — FRCP BX RADJAW4 NDL 2.8 240CM LG OG BX40

## (undated) DEVICE — ENDOPATH XCEL UNIVERSAL TROCAR STABLILITY SLEEVES: Brand: ENDOPATH XCEL

## (undated) DEVICE — DRSNG SURESITE WNDW 2.38X2.75

## (undated) DEVICE — PATIENT RETURN ELECTRODE, SINGLE-USE, CONTACT QUALITY MONITORING, ADULT, WITH 9FT CORD, FOR PATIENTS WEIGING OVER 33LBS. (15KG): Brand: MEGADYNE

## (undated) DEVICE — ADAPT CLN BIOGUARD AIR/H2O DISP

## (undated) DEVICE — SPNG GZ WOVN 4X4IN 12PLY 10/BX STRL

## (undated) DEVICE — HARMONIC ACE +7 LAPAROSCOPIC SHEARS ADVANCED HEMOSTASIS 5MM DIAMETER 36CM SHAFT LENGTH  FOR USE WITH GRAY HAND PIECE ONLY: Brand: HARMONIC ACE

## (undated) DEVICE — SYR LL TP 10ML STRL

## (undated) DEVICE — APPL CHLORAPREP HI/LITE 26ML ORNG

## (undated) DEVICE — ENSEAL TRIO TEMPERATURE CONTOLLED TISSUE SEALING TECHNOLOGY DISPOSABLE TISSUE SEALING DEVICE TAPTRONIC TRIGGER ACTIVATED POWER 3MM CURVED JAW: Brand: ENSEAL

## (undated) DEVICE — ADHS SKIN PREMIERPRO EXOFIN TOPICAL HI/VISC .5ML

## (undated) DEVICE — DRP C/ARM 41X74IN

## (undated) DEVICE — TROCARS: Brand: KII® BALLOON BLUNT TIP SYSTEM

## (undated) DEVICE — SET CATH CHOLANG REDK W/SCOOP TIP INTRO 4F 50CM

## (undated) DEVICE — DRSNG TELFA PAD NONADH STR 1S 3X8IN

## (undated) DEVICE — SUT VIC 2/0 UR6 27IN J602H

## (undated) DEVICE — DRSNG SURESITE WNDW 4X4.5

## (undated) DEVICE — SYR LL 3CC

## (undated) DEVICE — ENDOPATH PNEUMONEEDLE INSUFFLATION NEEDLES WITH LUER LOCK CONNECTORS 120MM: Brand: ENDOPATH

## (undated) DEVICE — TP SXN YANKR BULB STRL

## (undated) DEVICE — 3M™ STERI-STRIP™ REINFORCED ADHESIVE SKIN CLOSURES, R1547, 1/2 IN X 4 IN (12 MM X 100 MM), 6 STRIPS/ENVELOPE: Brand: 3M™ STERI-STRIP™